# Patient Record
Sex: MALE | Race: BLACK OR AFRICAN AMERICAN | NOT HISPANIC OR LATINO | URBAN - METROPOLITAN AREA
[De-identification: names, ages, dates, MRNs, and addresses within clinical notes are randomized per-mention and may not be internally consistent; named-entity substitution may affect disease eponyms.]

---

## 2020-07-06 ENCOUNTER — INPATIENT (INPATIENT)
Facility: HOSPITAL | Age: 60
LOS: 24 days | Discharge: HOME | End: 2020-07-31
Attending: INTERNAL MEDICINE | Admitting: INTERNAL MEDICINE
Payer: COMMERCIAL

## 2020-07-06 VITALS
WEIGHT: 100.09 LBS | OXYGEN SATURATION: 96 % | DIASTOLIC BLOOD PRESSURE: 74 MMHG | HEART RATE: 90 BPM | TEMPERATURE: 98 F | HEIGHT: 68 IN | SYSTOLIC BLOOD PRESSURE: 134 MMHG | RESPIRATION RATE: 16 BRPM

## 2020-07-06 LAB
ACETONE SERPL-MCNC: NEGATIVE — SIGNIFICANT CHANGE UP
ALBUMIN SERPL ELPH-MCNC: 3.9 G/DL — SIGNIFICANT CHANGE UP (ref 3.3–5)
ALP SERPL-CCNC: 96 U/L — SIGNIFICANT CHANGE UP (ref 40–120)
ALT FLD-CCNC: 29 U/L — SIGNIFICANT CHANGE UP (ref 12–78)
ANION GAP SERPL CALC-SCNC: 9 MMOL/L — SIGNIFICANT CHANGE UP (ref 5–17)
APTT BLD: 33.5 SEC — SIGNIFICANT CHANGE UP (ref 27.5–35.5)
AST SERPL-CCNC: 56 U/L — HIGH (ref 15–37)
BILIRUB SERPL-MCNC: 1.7 MG/DL — HIGH (ref 0.2–1.2)
BUN SERPL-MCNC: 11 MG/DL — SIGNIFICANT CHANGE UP (ref 7–23)
CALCIUM SERPL-MCNC: 9 MG/DL — SIGNIFICANT CHANGE UP (ref 8.5–10.1)
CHLORIDE SERPL-SCNC: 93 MMOL/L — LOW (ref 96–108)
CK MB BLD-MCNC: <2.9 % — SIGNIFICANT CHANGE UP (ref 0–3.5)
CK MB CFR SERPL CALC: <1 NG/ML — SIGNIFICANT CHANGE UP (ref 0.5–3.6)
CK SERPL-CCNC: 34 U/L — SIGNIFICANT CHANGE UP (ref 26–308)
CO2 SERPL-SCNC: 27 MMOL/L — SIGNIFICANT CHANGE UP (ref 22–31)
CREAT SERPL-MCNC: 1.07 MG/DL — SIGNIFICANT CHANGE UP (ref 0.5–1.3)
GLUCOSE BLDC GLUCOMTR-MCNC: 212 MG/DL — HIGH (ref 70–99)
GLUCOSE BLDC GLUCOMTR-MCNC: 301 MG/DL — HIGH (ref 70–99)
GLUCOSE BLDC GLUCOMTR-MCNC: 329 MG/DL — HIGH (ref 70–99)
GLUCOSE BLDC GLUCOMTR-MCNC: >600 MG/DL — CRITICAL HIGH (ref 70–99)
GLUCOSE BLDC GLUCOMTR-MCNC: >600 MG/DL — CRITICAL HIGH (ref 70–99)
GLUCOSE SERPL-MCNC: 611 MG/DL — CRITICAL HIGH (ref 70–99)
HCT VFR BLD CALC: 38.1 % — LOW (ref 39–50)
HGB BLD-MCNC: 13.2 G/DL — SIGNIFICANT CHANGE UP (ref 13–17)
INR BLD: 1.09 RATIO — SIGNIFICANT CHANGE UP (ref 0.88–1.16)
MCHC RBC-ENTMCNC: 23.1 PG — LOW (ref 27–34)
MCHC RBC-ENTMCNC: 34.6 GM/DL — SIGNIFICANT CHANGE UP (ref 32–36)
MCV RBC AUTO: 66.6 FL — LOW (ref 80–100)
NRBC # BLD: 0 /100 WBCS — SIGNIFICANT CHANGE UP (ref 0–0)
PLATELET # BLD AUTO: 277 K/UL — SIGNIFICANT CHANGE UP (ref 150–400)
POTASSIUM SERPL-MCNC: 4.7 MMOL/L — SIGNIFICANT CHANGE UP (ref 3.5–5.3)
POTASSIUM SERPL-SCNC: 4.7 MMOL/L — SIGNIFICANT CHANGE UP (ref 3.5–5.3)
PROT SERPL-MCNC: 8.7 GM/DL — HIGH (ref 6–8.3)
PROTHROM AB SERPL-ACNC: 12.1 SEC — SIGNIFICANT CHANGE UP (ref 10.6–13.6)
RBC # BLD: 5.72 M/UL — SIGNIFICANT CHANGE UP (ref 4.2–5.8)
RBC # FLD: 18.4 % — HIGH (ref 10.3–14.5)
SODIUM SERPL-SCNC: 129 MMOL/L — LOW (ref 135–145)
TROPONIN I SERPL-MCNC: <.015 NG/ML — SIGNIFICANT CHANGE UP (ref 0.01–0.04)
WBC # BLD: 6.61 K/UL — SIGNIFICANT CHANGE UP (ref 3.8–10.5)
WBC # FLD AUTO: 6.61 K/UL — SIGNIFICANT CHANGE UP (ref 3.8–10.5)

## 2020-07-06 PROCEDURE — 93010 ELECTROCARDIOGRAM REPORT: CPT

## 2020-07-06 PROCEDURE — 70450 CT HEAD/BRAIN W/O DYE: CPT | Mod: 26

## 2020-07-06 PROCEDURE — 74177 CT ABD & PELVIS W/CONTRAST: CPT | Mod: 26

## 2020-07-06 PROCEDURE — 71275 CT ANGIOGRAPHY CHEST: CPT | Mod: 26

## 2020-07-06 PROCEDURE — 99285 EMERGENCY DEPT VISIT HI MDM: CPT

## 2020-07-06 PROCEDURE — 99223 1ST HOSP IP/OBS HIGH 75: CPT

## 2020-07-06 RX ORDER — INSULIN LISPRO 100/ML
3 VIAL (ML) SUBCUTANEOUS
Refills: 0 | Status: DISCONTINUED | OUTPATIENT
Start: 2020-07-06 | End: 2020-07-08

## 2020-07-06 RX ORDER — GLUCAGON INJECTION, SOLUTION 0.5 MG/.1ML
1 INJECTION, SOLUTION SUBCUTANEOUS ONCE
Refills: 0 | Status: DISCONTINUED | OUTPATIENT
Start: 2020-07-06 | End: 2020-07-31

## 2020-07-06 RX ORDER — INSULIN LISPRO 100/ML
VIAL (ML) SUBCUTANEOUS
Refills: 0 | Status: DISCONTINUED | OUTPATIENT
Start: 2020-07-06 | End: 2020-07-21

## 2020-07-06 RX ORDER — HEXAVITAMINS
300 TABLET ORAL DAILY
Refills: 0 | Status: DISCONTINUED | OUTPATIENT
Start: 2020-07-06 | End: 2020-07-13

## 2020-07-06 RX ORDER — DEXTROSE 50 % IN WATER 50 %
12.5 SYRINGE (ML) INTRAVENOUS ONCE
Refills: 0 | Status: DISCONTINUED | OUTPATIENT
Start: 2020-07-06 | End: 2020-07-31

## 2020-07-06 RX ORDER — SODIUM CHLORIDE 9 MG/ML
1000 INJECTION, SOLUTION INTRAVENOUS
Refills: 0 | Status: DISCONTINUED | OUTPATIENT
Start: 2020-07-06 | End: 2020-07-06

## 2020-07-06 RX ORDER — DEXTROSE 50 % IN WATER 50 %
25 SYRINGE (ML) INTRAVENOUS ONCE
Refills: 0 | Status: DISCONTINUED | OUTPATIENT
Start: 2020-07-06 | End: 2020-07-31

## 2020-07-06 RX ORDER — SODIUM CHLORIDE 9 MG/ML
1000 INJECTION, SOLUTION INTRAVENOUS
Refills: 0 | Status: DISCONTINUED | OUTPATIENT
Start: 2020-07-06 | End: 2020-07-31

## 2020-07-06 RX ORDER — INSULIN GLARGINE 100 [IU]/ML
10 INJECTION, SOLUTION SUBCUTANEOUS AT BEDTIME
Refills: 0 | Status: DISCONTINUED | OUTPATIENT
Start: 2020-07-06 | End: 2020-07-11

## 2020-07-06 RX ORDER — ENOXAPARIN SODIUM 100 MG/ML
40 INJECTION SUBCUTANEOUS DAILY
Refills: 0 | Status: DISCONTINUED | OUTPATIENT
Start: 2020-07-06 | End: 2020-07-23

## 2020-07-06 RX ORDER — DEXTROSE 50 % IN WATER 50 %
15 SYRINGE (ML) INTRAVENOUS ONCE
Refills: 0 | Status: DISCONTINUED | OUTPATIENT
Start: 2020-07-06 | End: 2020-07-31

## 2020-07-06 RX ORDER — HEXAVITAMINS
1 TABLET ORAL
Qty: 0 | Refills: 0 | DISCHARGE

## 2020-07-06 RX ORDER — SODIUM CHLORIDE 9 MG/ML
1000 INJECTION INTRAMUSCULAR; INTRAVENOUS; SUBCUTANEOUS ONCE
Refills: 0 | Status: COMPLETED | OUTPATIENT
Start: 2020-07-06 | End: 2020-07-06

## 2020-07-06 RX ORDER — INSULIN HUMAN 100 [IU]/ML
14 INJECTION, SOLUTION SUBCUTANEOUS ONCE
Refills: 0 | Status: COMPLETED | OUTPATIENT
Start: 2020-07-06 | End: 2020-07-06

## 2020-07-06 RX ORDER — PYRIDOXINE HCL (VITAMIN B6) 100 MG
50 TABLET ORAL DAILY
Refills: 0 | Status: DISCONTINUED | OUTPATIENT
Start: 2020-07-06 | End: 2020-07-25

## 2020-07-06 RX ORDER — INSULIN LISPRO 100/ML
2 VIAL (ML) SUBCUTANEOUS
Refills: 0 | Status: DISCONTINUED | OUTPATIENT
Start: 2020-07-06 | End: 2020-07-06

## 2020-07-06 RX ORDER — SODIUM CHLORIDE 9 MG/ML
1000 INJECTION, SOLUTION INTRAVENOUS
Refills: 0 | Status: DISCONTINUED | OUTPATIENT
Start: 2020-07-06 | End: 2020-07-08

## 2020-07-06 RX ADMIN — INSULIN GLARGINE 10 UNIT(S): 100 INJECTION, SOLUTION SUBCUTANEOUS at 22:50

## 2020-07-06 RX ADMIN — SODIUM CHLORIDE 1000 MILLILITER(S): 9 INJECTION INTRAMUSCULAR; INTRAVENOUS; SUBCUTANEOUS at 13:36

## 2020-07-06 RX ADMIN — INSULIN HUMAN 14 UNIT(S): 100 INJECTION, SOLUTION SUBCUTANEOUS at 11:51

## 2020-07-06 RX ADMIN — SODIUM CHLORIDE 1000 MILLILITER(S): 9 INJECTION INTRAMUSCULAR; INTRAVENOUS; SUBCUTANEOUS at 11:51

## 2020-07-06 RX ADMIN — SODIUM CHLORIDE 1000 MILLILITER(S): 9 INJECTION INTRAMUSCULAR; INTRAVENOUS; SUBCUTANEOUS at 16:36

## 2020-07-06 RX ADMIN — SODIUM CHLORIDE 70 MILLILITER(S): 9 INJECTION, SOLUTION INTRAVENOUS at 22:50

## 2020-07-06 NOTE — CONSULT NOTE ADULT - SUBJECTIVE AND OBJECTIVE BOX
Tele Hospitalist Consult Note for Inpatient Admission    PMD:    HPI:  60 yr old diagnosed of latent TB and started on Rifampin and Isoniazid 3 months ago while in [facility]. He came to [place] 2 weeks ago and the dgtr has noticed 20 pds wt loss, weakness, poor appetite, constantly thirsty. They attributed it to the TB medications SE. However patient passed out ---    In ED noted severe hyperglycemia---    PAST MEDICAL & SURGICAL HISTORY:  TB (tuberculosis) Latent    No significant past surgical history    MEDICATIONS  (STANDING):  sodium chloride 0.9% Bolus 1000 milliLiter(s) IV Bolus once    MEDICATIONS  (PRN):  None    Allergies  No Known Allergies    Intolerances  None    SOCIAL HISTORY:    FAMILY HISTORY:      Vital Signs Last 24 Hrs  T(C): 36.5 (06 Jul 2020 09:25), Max: 36.5 (06 Jul 2020 09:25)  T(F): 97.7 (06 Jul 2020 09:25), Max: 97.7 (06 Jul 2020 09:25)  HR: 90 (06 Jul 2020 09:25) (90 - 90)  BP: 134/74 (06 Jul 2020 09:25) (134/74 - 134/74)  BP(mean): --  RR: 16 (06 Jul 2020 09:25) (16 - 16)  SpO2: 96% (06 Jul 2020 09:25) (96% - 96%)    REVIEW OF SYSTEMS:        PHYSICAL EXAM:          LABS:                        13.2   6.61  )-----------( 277      ( 06 Jul 2020 10:33 )             38.1     07-06    129<L>  |  93<L>  |  11  ----------------------------<  611<HH>  4.7   |  27  |  1.07    Ca    9.0      06 Jul 2020 10:33    TPro  8.7<H>  /  Alb  3.9  /  TBili  1.7<H>  /  DBili  x   /  AST  56<H>  /  ALT  29  /  AlkPhos  96  07-06    PT/INR - ( 06 Jul 2020 10:33 )   PT: 12.1 sec;   INR: 1.09 ratio         PTT - ( 06 Jul 2020 10:33 )  PTT:33.5 sec      RADIOLOGY & ADDITIONAL STUDIES: Tele Hospitalist Consult Note for Inpatient Admission    PMD: Artie Gallegos    History provided by Dgtr Ingrid    HPI:  60 yr old diagnosed of latent TB and started on Rifampin and Isoniazid 3 months ago while in [facility]. He came to [place] 2 weeks ago and the dgtr has noticed 20 pds wt loss, weakness, poor appetite, constantly thirsty, increased urination. They attributed it to the TB medications SE. They did take him to their PMD, who recommended that he go to ED. However patient passed out- slumped over while sitting in a chair and did not respond for couple of minutes. Upon shaking after he came awake and said he was fine. Dgtr brought him to ED with concerns.     In ED noted severe hyperglycemia and not in DKA. IVF and Insulin given.    PAST MEDICAL & SURGICAL HISTORY:  TB (tuberculosis) Latent    No significant past surgical history    MEDICATIONS  (STANDING):  sodium chloride 0.9% Bolus 1000 milliLiter(s) IV Bolus once    MEDICATIONS  (PRN):  None    Allergies  No Known Allergies    Intolerances  None    SOCIAL HISTORY:  Denies habits    FAMILY HISTORY:  Family not aware of significant med dz in the family    Vital Signs Last 24 Hrs  T(C): 36.5 (06 Jul 2020 09:25), Max: 36.5 (06 Jul 2020 09:25)  T(F): 97.7 (06 Jul 2020 09:25), Max: 97.7 (06 Jul 2020 09:25)  HR: 90 (06 Jul 2020 09:25) (90 - 90)  BP: 134/74 (06 Jul 2020 09:25) (134/74 - 134/74)  BP(mean): --  RR: 16 (06 Jul 2020 09:25) (16 - 16)  SpO2: 96% (06 Jul 2020 09:25) (96% - 96%)    REVIEW OF SYSTEMS:  weak +  increased thirst and urination +  weight loss +  cachexia +  dry mouth+  feels better since being in the hospital  finger cramps +    PHYSICAL EXAM:  comfortable, lying in bed, no acute distress  able to converse and give pertinent history    LABS:                        13.2   6.61  )-----------( 277      ( 06 Jul 2020 10:33 )             38.1     07-06    129<L>  |  93<L>  |  11  ----------------------------<  611<HH>  4.7   |  27  |  1.07    Ca    9.0      06 Jul 2020 10:33    TPro  8.7<H>  /  Alb  3.9  /  TBili  1.7<H>  /  DBili  x   /  AST  56<H>  /  ALT  29  /  AlkPhos  96  07-06    PT/INR - ( 06 Jul 2020 10:33 )   PT: 12.1 sec;   INR: 1.09 ratio         PTT - ( 06 Jul 2020 10:33 )  PTT:33.5 sec      RADIOLOGY & ADDITIONAL STUDIES:

## 2020-07-06 NOTE — ED ADULT TRIAGE NOTE - CHIEF COMPLAINT QUOTE
General weakness x 2 weeks, weight loss 20 pounds and passed out today, on meds for TB started 3 months ago

## 2020-07-06 NOTE — ED PROVIDER NOTE - CLINICAL SUMMARY MEDICAL DECISION MAKING FREE TEXT BOX
pt presented with weakness, syncope and new onset diabetes, currently being treated for latent tb, ct shows rul infiltrates, pt given ivf and insulin

## 2020-07-06 NOTE — CONSULT NOTE ADULT - ASSESSMENT
60 yr old with Latent TB from [facility] with wt loss, constant thirst, poor appetite, weakness and    # New diagnosis of DM2 with severe hyperglycemia  not in DKA  received 3 L fluid bolus in ED and 14 units Humulin  Accucheck from >600 to 300  Aggressive hydration  Given poor PO intake Accuchecks Q 6 hrs for next 24-48 hrs  Lantus HS, HISS, Premeal Insulin. titrate as needed  Endocrine consult if necessary  DM education    # Pseudohyponatremia  Corrected sodium 141  More IVF  Monitor for correction with glycemic control    # Syncope  CT head nonacute  PE ruled out  EKG nonacute  hemodynamically stable  can be sec to hyperglycemia  However if further cardio w/u required will defer to attending of record  PT eval    # Microcytosis  H/H good  if microcytosis persists can add iron to his regimen    # very mild elevation of AST and Bili  likely sec to anti TB meds  Monitor    # Cachexia by BMI  add nutritional supplements    # Latent TB  Cont Rifampin and Isoniazid  CT findings on the upper lobe consistent    Lovenox 60 yr old with Latent TB from [facility] with wt loss, constant thirst, poor appetite, weakness and increased urination     # New diagnosis of DM2 with severe hyperglycemia  not in DKA  received 3 L fluid bolus in ED and 14 units Humulin  Accucheck from >600 to 300  Aggressive hydration  Given poor PO intake Accuchecks Q 6 hrs for next 24-48 hrs  Lantus HS, HISS, Premeal Insulin. titrate as needed  Endocrine consult if necessary  DM education    # Pseudohyponatremia  Corrected sodium 141  More IVF  Monitor for correction with glycemic control    # Syncope  CT head nonacute  PE ruled out  EKG nonacute  hemodynamically stable  can be sec to hyperglycemia  However if further cardio w/u required will defer to attending of record  PT eval    # Microcytosis  H/H good  if microcytosis persists can add iron to his regimen    # very mild elevation of AST and Bili  likely sec to anti TB meds  Monitor  weekly LFTs    # Cachexia by BMI  add nutritional supplements    # Latent TB  Cont Rifampin 10mg/kg  and Isoniazid 5mg/kg x 3 months, B6  CT findings on the upper lobe consistent  given diagnosis was 3 months, recommend ID consult    Lovenox    Relevant diagnostics and plan of treatment were discussed with the patient to the best of my ability and pertinent questions were answered. 60 yr old with Latent TB from [facility] with wt loss, constant thirst, poor appetite, weakness and increased urination .    Admission note from telehospitalist reviewed in full. PE exam reviewed in full. Agree with above treatment and plan.   No anion gap elevation on arrival.      # New diagnosis of DM2 with severe hyperglycemia  not in DKA  received 3 L fluid bolus in ED and 14 units Humulin  Accucheck from >600 to 300  Aggressive hydration  Given poor PO intake Accuchecks Q 6 hrs for next 24-48 hrs  Lantus HS, HISS, Premeal Insulin. titrate as needed  Endocrine consult if necessary  DM education    # Pseudohyponatremia  Corrected sodium 141  More IVF  Monitor for correction with glycemic control    # Syncope  CT head nonacute  PE ruled out  EKG nonacute  hemodynamically stable  can be sec to hyperglycemia  However if further cardio w/u required will defer to attending of record  PT eval    # Microcytosis  H/H good  if microcytosis persists can add iron to his regimen    # very mild elevation of AST and Bili  likely sec to anti TB meds  Monitor  weekly LFTs    # Cachexia by BMI  add nutritional supplements    # Latent TB  Cont Rifampin 10mg/kg  and Isoniazid 5mg/kg x 3 months, B6  CT findings on the upper lobe consistent  given diagnosis was 3 months, recommend ID consult    Lovenox    Relevant diagnostics and plan of treatment were discussed with the patient to the best of my ability and pertinent questions were answered.

## 2020-07-06 NOTE — ED ADULT NURSE NOTE - CAS TRG GENERAL AIRWAY, MLM
Patent Anxiety    Depression    Kidney cysts    OA (osteoarthritis)    Osteoporosis    Peripheral neuropathy    RA (rheumatoid arthritis)

## 2020-07-06 NOTE — ED PROVIDER NOTE - OBJECTIVE STATEMENT
60 year old male with recent h/o latent TB presents today biba from home for weakness x 2 weeks and syncope at home today, pt's daughter (Ingrid) reports that pt recently came from [facility] one week ago, he was diagnose while in [facility] with latent Tb three months ago, started on Rifampin and isoniazid, once he came to [place] she states that she noticed that he was very weak, not eating, very thirsty and weight loss of 20lbs, initially they thought it was due to the TB medications, however, when he passed out today EMS was called, +unsteady gait (-) nausea or vomiting (-) chest pain (-) sob

## 2020-07-06 NOTE — ED PROVIDER NOTE - CARE PLAN
Principal Discharge DX:	TB (pulmonary tuberculosis)  Secondary Diagnosis:	Syncope  Secondary Diagnosis:	Diabetes  Secondary Diagnosis:	Weight loss

## 2020-07-06 NOTE — ED ADULT NURSE REASSESSMENT NOTE - NS ED NURSE REASSESS COMMENT FT1
spoke with provider in regards to patients Na level. no orders placed, will endorse to floor RN if further treatment required.

## 2020-07-07 DIAGNOSIS — E43 UNSPECIFIED SEVERE PROTEIN-CALORIE MALNUTRITION: ICD-10-CM

## 2020-07-07 DIAGNOSIS — N17.9 ACUTE KIDNEY FAILURE, UNSPECIFIED: ICD-10-CM

## 2020-07-07 DIAGNOSIS — E11.65 TYPE 2 DIABETES MELLITUS WITH HYPERGLYCEMIA: ICD-10-CM

## 2020-07-07 DIAGNOSIS — E87.1 HYPO-OSMOLALITY AND HYPONATREMIA: ICD-10-CM

## 2020-07-07 DIAGNOSIS — A15.0 TUBERCULOSIS OF LUNG: ICD-10-CM

## 2020-07-07 DIAGNOSIS — R55 SYNCOPE AND COLLAPSE: ICD-10-CM

## 2020-07-07 LAB
A1C WITH ESTIMATED AVERAGE GLUCOSE RESULT: 10.6 % — HIGH (ref 4–5.6)
ANION GAP SERPL CALC-SCNC: 6 MMOL/L — SIGNIFICANT CHANGE UP (ref 5–17)
BASOPHILS # BLD AUTO: 0.03 K/UL — SIGNIFICANT CHANGE UP (ref 0–0.2)
BASOPHILS NFR BLD AUTO: 0.5 % — SIGNIFICANT CHANGE UP (ref 0–2)
BUN SERPL-MCNC: 9 MG/DL — SIGNIFICANT CHANGE UP (ref 7–23)
CALCIUM SERPL-MCNC: 7.9 MG/DL — LOW (ref 8.5–10.1)
CHLORIDE SERPL-SCNC: 105 MMOL/L — SIGNIFICANT CHANGE UP (ref 96–108)
CO2 SERPL-SCNC: 27 MMOL/L — SIGNIFICANT CHANGE UP (ref 22–31)
CREAT SERPL-MCNC: 0.82 MG/DL — SIGNIFICANT CHANGE UP (ref 0.5–1.3)
EOSINOPHIL # BLD AUTO: 0.06 K/UL — SIGNIFICANT CHANGE UP (ref 0–0.5)
EOSINOPHIL NFR BLD AUTO: 1 % — SIGNIFICANT CHANGE UP (ref 0–6)
ESTIMATED AVERAGE GLUCOSE: 258 MG/DL — HIGH (ref 68–114)
GLUCOSE BLDC GLUCOMTR-MCNC: 215 MG/DL — HIGH (ref 70–99)
GLUCOSE BLDC GLUCOMTR-MCNC: 270 MG/DL — HIGH (ref 70–99)
GLUCOSE BLDC GLUCOMTR-MCNC: 340 MG/DL — HIGH (ref 70–99)
GLUCOSE BLDC GLUCOMTR-MCNC: 490 MG/DL — CRITICAL HIGH (ref 70–99)
GLUCOSE BLDC GLUCOMTR-MCNC: 510 MG/DL — CRITICAL HIGH (ref 70–99)
GLUCOSE SERPL-MCNC: 301 MG/DL — HIGH (ref 70–99)
HCT VFR BLD CALC: 33.3 % — LOW (ref 39–50)
HGB BLD-MCNC: 11.7 G/DL — LOW (ref 13–17)
IMM GRANULOCYTES NFR BLD AUTO: 0.3 % — SIGNIFICANT CHANGE UP (ref 0–1.5)
LYMPHOCYTES # BLD AUTO: 2 K/UL — SIGNIFICANT CHANGE UP (ref 1–3.3)
LYMPHOCYTES # BLD AUTO: 32.6 % — SIGNIFICANT CHANGE UP (ref 13–44)
MAGNESIUM SERPL-MCNC: 2.2 MG/DL — SIGNIFICANT CHANGE UP (ref 1.6–2.6)
MCHC RBC-ENTMCNC: 23.6 PG — LOW (ref 27–34)
MCHC RBC-ENTMCNC: 35.1 GM/DL — SIGNIFICANT CHANGE UP (ref 32–36)
MCV RBC AUTO: 67.1 FL — LOW (ref 80–100)
MONOCYTES # BLD AUTO: 0.6 K/UL — SIGNIFICANT CHANGE UP (ref 0–0.9)
MONOCYTES NFR BLD AUTO: 9.8 % — SIGNIFICANT CHANGE UP (ref 2–14)
NEUTROPHILS # BLD AUTO: 3.42 K/UL — SIGNIFICANT CHANGE UP (ref 1.8–7.4)
NEUTROPHILS NFR BLD AUTO: 55.8 % — SIGNIFICANT CHANGE UP (ref 43–77)
NRBC # BLD: 0 /100 WBCS — SIGNIFICANT CHANGE UP (ref 0–0)
OSMOLALITY SERPL: 300 MOSMOL/KG — SIGNIFICANT CHANGE UP (ref 280–301)
PHOSPHATE SERPL-MCNC: 2.7 MG/DL — SIGNIFICANT CHANGE UP (ref 2.5–4.5)
PLATELET # BLD AUTO: 257 K/UL — SIGNIFICANT CHANGE UP (ref 150–400)
POTASSIUM SERPL-MCNC: 3.8 MMOL/L — SIGNIFICANT CHANGE UP (ref 3.5–5.3)
POTASSIUM SERPL-SCNC: 3.8 MMOL/L — SIGNIFICANT CHANGE UP (ref 3.5–5.3)
RBC # BLD: 4.96 M/UL — SIGNIFICANT CHANGE UP (ref 4.2–5.8)
RBC # FLD: 17.5 % — HIGH (ref 10.3–14.5)
SARS-COV-2 RNA SPEC QL NAA+PROBE: SIGNIFICANT CHANGE UP
SODIUM SERPL-SCNC: 138 MMOL/L — SIGNIFICANT CHANGE UP (ref 135–145)
WBC # BLD: 6.13 K/UL — SIGNIFICANT CHANGE UP (ref 3.8–10.5)
WBC # FLD AUTO: 6.13 K/UL — SIGNIFICANT CHANGE UP (ref 3.8–10.5)

## 2020-07-07 PROCEDURE — 99233 SBSQ HOSP IP/OBS HIGH 50: CPT

## 2020-07-07 RX ADMIN — ENOXAPARIN SODIUM 40 MILLIGRAM(S): 100 INJECTION SUBCUTANEOUS at 13:19

## 2020-07-07 RX ADMIN — Medication 6: at 08:54

## 2020-07-07 RX ADMIN — Medication 3 UNIT(S): at 12:58

## 2020-07-07 RX ADMIN — Medication 8: at 11:23

## 2020-07-07 RX ADMIN — Medication 3 UNIT(S): at 17:17

## 2020-07-07 RX ADMIN — Medication 3 UNIT(S): at 08:54

## 2020-07-07 RX ADMIN — Medication 300 MILLIGRAM(S): at 13:18

## 2020-07-07 RX ADMIN — Medication 12: at 17:17

## 2020-07-07 RX ADMIN — INSULIN GLARGINE 10 UNIT(S): 100 INJECTION, SOLUTION SUBCUTANEOUS at 21:53

## 2020-07-07 RX ADMIN — Medication 50 MILLIGRAM(S): at 13:18

## 2020-07-07 NOTE — PROGRESS NOTE ADULT - SUBJECTIVE AND OBJECTIVE BOX
Patient is a 60y old  Male who presents with a chief complaint of   HPI:    SUBJECTIVE & OBJECTIVE: Pt seen and examined at bedside.   PHYSICAL EXAM:   Vital Signs Last 24 Hrs  T(C): 36.9 (2020 16:39), Max: 37.2 (2020 05:37)  T(F): 98.5 (2020 16:39), Max: 98.9 (2020 05:37)  HR: 76 (2020 16:39) (71 - 88)  BP: 114/62 (2020 16:39) (107/63 - 123/65)  BP(mean): --  ABP: --  ABP(mean): --  RR: 18 (2020 16:39) (16 - 18)  SpO2: 98% (2020 16:39) (98% - 99%)  Daily     Daily Weight in k.2 (2020 05:37)  I&O's Detail    2020 07:01  -  2020 07:00  --------------------------------------------------------  IN:  Total IN: 0 mL    OUT:    Voided: 600 mL  Total OUT: 600 mL    Total NET: -600 mL      2020 07:01  -  2020 21:13  --------------------------------------------------------  IN:    Oral Fluid: 440 mL    sodium chloride 0.9%: 140 mL  Total IN: 580 mL    OUT:    Voided: 600 mL  Total OUT: 600 mL    Total NET: -20 mL        GENERAL: NAD, well-groomed, well-developed  HEAD:  Atraumatic, Normocephalic  EYES: EOMI, PERRLA, conjunctiva and sclera clear  ENMT: Moist mucous membranes  NECK: Supple, No JVD  NERVOUS SYSTEM:  Alert & Oriented X3, Motor Strength 5/5 B/L upper and lower extremities; DTRs 2+ intact and symmetric  CHEST/LUNG: poor air entry to bedside.   HEART: Regular rate and rhythm; No murmurs, rubs, or gallops  ABDOMEN: Soft, Nontender, Nondistended; Bowel sounds present  EXTREMITIES:  2+ Peripheral Pulses, No clubbing, cyanosis, or edema  MEDICATIONS  (STANDING):  dextrose 5%. 1000 milliLiter(s) (50 mL/Hr) IV Continuous <Continuous>  dextrose 50% Injectable 12.5 Gram(s) IV Push once  dextrose 50% Injectable 25 Gram(s) IV Push once  dextrose 50% Injectable 25 Gram(s) IV Push once  enoxaparin Injectable 40 milliGRAM(s) SubCutaneous daily  insulin glargine Injectable (LANTUS) 10 Unit(s) SubCutaneous at bedtime  insulin lispro (HumaLOG) corrective regimen sliding scale   SubCutaneous three times a day before meals  insulin lispro Injectable (HumaLOG) 3 Unit(s) SubCutaneous three times a day with meals  isoniazid 300 milliGRAM(s) Oral daily  pyridoxine 50 milliGRAM(s) Oral daily  rifAMPin 600 milliGRAM(s) Oral daily  sodium chloride 0.9% 1000 milliLiter(s) (70 mL/Hr) IV Continuous <Continuous>    MEDICATIONS  (PRN):  dextrose 40% Gel 15 Gram(s) Oral once PRN Blood Glucose LESS THAN 70 milliGRAM(s)/deciliter  glucagon  Injectable 1 milliGRAM(s) IntraMuscular once PRN Glucose LESS THAN 70 milligrams/deciliter    LABS:                        11.7   6.13  )-----------( 257      ( 2020 06:28 )             33.3     07-07    138  |  105  |  9   ----------------------------<  301<H>  3.8   |  27  |  0.82    Ca    7.9<L>      2020 06:28  Phos  2.7     07-07  Mg     2.2     07-07    TPro  8.7<H>  /  Alb  3.9  /  TBili  1.7<H>  /  DBili  x   /  AST  56<H>  /  ALT  29  /  AlkPhos  96  07-06    PT/INR - ( 2020 10:33 )   PT: 12.1 sec;   INR: 1.09 ratio         PTT - ( 2020 10:33 )  PTT:33.5 sec    CAPILLARY BLOOD GLUCOSE      POCT Blood Glucose.: 510 mg/dL (2020 16:52)  POCT Blood Glucose.: 490 mg/dL (2020 16:51)  POCT Blood Glucose.: 340 mg/dL (2020 11:17)  POCT Blood Glucose.: 270 mg/dL (2020 08:06)  POCT Blood Glucose.: 329 mg/dL (2020 22:49)      CARDIAC MARKERS ( 2020 10:33 )  <.015 ng/mL / x     / 34 U/L / x     / <1.0 ng/mL        RECENT CULTURES:    RADIOLOGY & ADDITIONAL TESTS:    DVT/GI ppx  Discussed with pt @ bedside

## 2020-07-07 NOTE — PROGRESS NOTE ADULT - PROBLEM SELECTOR PLAN 3
- cavitary lesions on imaging.  Of note, patient had COVID-19 in March 2020 while in [facility], ?? if cavitary lesions are post- COVID sequela.    - ID consult in am   Cont Rifampin 10mg/kg  and Isoniazid 5mg/kg x 3 months, B6  CT findings on the upper lobe consistent

## 2020-07-07 NOTE — PROGRESS NOTE ADULT - PROBLEM SELECTOR PLAN 2
CT head nonacute  PE ruled out  EKG nonacute  hemodynamically stable  can be sec to hyperglycemia  check orthostatics, continue on Tele   PT eval

## 2020-07-07 NOTE — PROGRESS NOTE ADULT - ASSESSMENT
60 yr old with Latent TB from [facility] with wt loss, constant thirst, poor appetite, weakness and increased urination .

## 2020-07-08 LAB
GLUCOSE BLDC GLUCOMTR-MCNC: 226 MG/DL — HIGH (ref 70–99)
GLUCOSE BLDC GLUCOMTR-MCNC: 268 MG/DL — HIGH (ref 70–99)
GLUCOSE BLDC GLUCOMTR-MCNC: 274 MG/DL — HIGH (ref 70–99)
GLUCOSE BLDC GLUCOMTR-MCNC: 382 MG/DL — HIGH (ref 70–99)
PROCALCITONIN SERPL-MCNC: 0.44 NG/ML — HIGH (ref 0.02–0.1)

## 2020-07-08 PROCEDURE — 99233 SBSQ HOSP IP/OBS HIGH 50: CPT

## 2020-07-08 RX ORDER — INSULIN LISPRO 100/ML
5 VIAL (ML) SUBCUTANEOUS
Refills: 0 | Status: DISCONTINUED | OUTPATIENT
Start: 2020-07-08 | End: 2020-07-11

## 2020-07-08 RX ORDER — ACETAMINOPHEN 500 MG
650 TABLET ORAL ONCE
Refills: 0 | Status: COMPLETED | OUTPATIENT
Start: 2020-07-08 | End: 2020-07-08

## 2020-07-08 RX ADMIN — Medication 650 MILLIGRAM(S): at 06:11

## 2020-07-08 NOTE — H&P ADULT - NSHPLABSRESULTS_GEN_ALL_CORE
LABS:                        11.7   6.13  )-----------( 257      ( 07 Jul 2020 06:28 )             33.3     07-07    138  |  105  |  9   ----------------------------<  301<H>  3.8   |  27  |  0.82    Ca    7.9<L>      07 Jul 2020 06:28  Phos  2.7     07-07  Mg     2.2     07-07              RADIOLOGY & ADDITIONAL TESTS:

## 2020-07-08 NOTE — H&P ADULT - ASSESSMENT
60 yr old with Latent TB from [facility] with wt loss, constant thirst, poor appetite, weakness and increased urination .    Admission note from telehospitalist reviewed in full. PE exam reviewed in full. Agree with above treatment and plan.   No anion gap elevation on arrival.      # New diagnosis of DM2 with severe hyperglycemia  not in DKA  received 3 L fluid bolus in ED and 14 units Humulin  Accucheck from >600 to 300  Aggressive hydration  Given poor PO intake Accuchecks Q 6 hrs for next 24-48 hrs  Lantus HS, HISS, Premeal Insulin. titrate as needed  Endocrine consult if necessary  DM education    # Pseudohyponatremia  Corrected sodium 141  More IVF  Monitor for correction with glycemic control    # Syncope  CT head nonacute  PE ruled out  EKG nonacute  hemodynamically stable  can be sec to hyperglycemia  However if further cardio w/u required will defer to attending of record  PT eval    # Microcytosis  H/H good  if microcytosis persists can add iron to his regimen    # very mild elevation of AST and Bili  likely sec to anti TB meds  Monitor  weekly LFTs    # Cachexia by BMI  add nutritional supplements    # Latent TB  Cont Rifampin 10mg/kg  and Isoniazid 5mg/kg x 3 months, B6  CT findings on the upper lobe consistent  given diagnosis was 3 months, recommend ID consult 60 yr old with Latent TB from [facility] with wt loss, constant thirst, poor appetite, weakness and increased urination .    Admission note from telehospitalist reviewed in full. PE exam reviewed in full. Agree with above treatment and plan.   No anion gap elevation on arrival.      # New diagnosis of DM2 with severe hyperglycemia  not in DKA  received 3 L fluid bolus in ED and 14 units Humulin  Accucheck from >600 to 300  Aggressive hydration  Given poor PO intake Accuchecks Q 6 hrs for next 24-48 hrs  Lantus HS, HISS, Premeal Insulin. titrate as needed  Endocrine consult if necessary  DM education    # Pseudohyponatremia  Corrected sodium 141  More IVF  Monitor for correction with glycemic control    # Syncope  CT head nonacute  PE ruled out  EKG nonacute  hemodynamically stable  can be sec to hyperglycemia  However if further cardio w/u required will defer to attending of record  PT eval    # Microcytosis  H/H good  if microcytosis persists can add iron to his regimen    # very mild elevation of AST and Bili  likely sec to anti TB meds  Monitor  weekly LFTs    # Cachexia by BMI  add nutritional supplements    # Latent TB  Cont Rifampin 10mg/kg  and Isoniazid 5mg/kg x 3 months, B6  CT findings on the upper lobe consistent  given diagnosis was 3 months, recommend ID consult    Patient was seen and examined on July 6.

## 2020-07-08 NOTE — CONSULT NOTE ADULT - SUBJECTIVE AND OBJECTIVE BOX
HPI:      PAST MEDICAL & SURGICAL HISTORY:  TB (tuberculosis)  No significant past surgical history      SOCHX:   tobacco,  -  alcohol    FMHX: FA/MO  - contributory       Recent Travel:    Immunizations:    Allergies    No Known Allergies    Intolerances        MEDICATIONS  (STANDING):  dextrose 5%. 1000 milliLiter(s) (50 mL/Hr) IV Continuous <Continuous>  dextrose 50% Injectable 12.5 Gram(s) IV Push once  dextrose 50% Injectable 25 Gram(s) IV Push once  dextrose 50% Injectable 25 Gram(s) IV Push once  enoxaparin Injectable 40 milliGRAM(s) SubCutaneous daily  insulin glargine Injectable (LANTUS) 10 Unit(s) SubCutaneous at bedtime  insulin lispro (HumaLOG) corrective regimen sliding scale   SubCutaneous three times a day before meals  insulin lispro Injectable (HumaLOG) 3 Unit(s) SubCutaneous three times a day with meals  isoniazid 300 milliGRAM(s) Oral daily  pyridoxine 50 milliGRAM(s) Oral daily  rifAMPin 600 milliGRAM(s) Oral daily  sodium chloride 0.9% 1000 milliLiter(s) (70 mL/Hr) IV Continuous <Continuous>        REVIEW OF SYSTEMS:  CONSTITUTIONAL: No fever, weight loss, or fatigue  EYES: No eye pain, visual disturbances, or discharge  ENMT:  No difficulty hearing, tinnitus, vertigo; No sinus or throat pain  NECK: No pain or stiffness  BREASTS: No pain, masses, or nipple discharge  RESPIRATORY: No cough, wheezing, chills or hemoptysis; No shortness of breath  CARDIOVASCULAR: No chest pain, palpitations, dizziness, or leg swelling  GASTROINTESTINAL: No abdominal or epigastric pain. No nausea, vomiting, or hematemesis; No diarrhea or constipation. No melena or hematochezia.  GENITOURINARY: No dysuria, frequency, hematuria, or incontinence  NEUROLOGICAL: No headaches, memory loss, loss of strength, numbness, or tremors  SKIN: No itching, burning, rashes, or lesions       VITAL SIGNS:    T(C): 36.4 (07-08-20 @ 11:00), Max: 36.9 (07-07-20 @ 16:39)  T(F): 97.6 (07-08-20 @ 11:00), Max: 98.5 (07-07-20 @ 16:39)  HR: 74 (07-08-20 @ 11:00) (74 - 91)  BP: 109/68 (07-08-20 @ 11:00) (109/68 - 115/73)  RR: 17 (07-08-20 @ 11:00) (17 - 18)  SpO2: 97% (07-08-20 @ 11:00) (97% - 99%)    PHYSICAL EXAM:    GENERAL: NAD, well-groomed, can communicate well , in RA   HEAD:  Atraumatic, Normocephalic  EYES: EOMI, PERRLA, conjunctiva and sclera clear  ENMT: No tonsillar erythema, exudates, or enlargement; Moist mucous membranes, Good dentition, No lesions  NECK: Supple, No JVD, Normal thyroid  NERVOUS SYSTEM:  Alert & Oriented X3, Good concentration; Motor Strength 5/5 B/L upper and lower extremities; DTRs 2+ intact and symmetric  CHEST/LUNG: Clear bilaterally; No rales, rhonchi, wheezing bilaterally  HEART: Regular rate and rhythm; No murmurs, rubs, or gallops  ABDOMEN: Soft, Nontender, Nondistended; Bowel sounds present  EXTREMITIES:  2+ Peripheral Pulses, No clubbing, cyanosis, or edema  LYMPH: No lymphadenopathy noted  SKIN: No rashes or lesions  BACK: no pressor sore     LABS:                         11.7   6.13  )-----------( 257      ( 07 Jul 2020 06:28 )             33.3     07-07    138  |  105  |  9   ----------------------------<  301<H>  3.8   |  27  |  0.82    Ca    7.9<L>      07 Jul 2020 06:28  Phos  2.7     07-07  Mg     2.2     07-07                                              Radiology:

## 2020-07-08 NOTE — H&P ADULT - NSHPPHYSICALEXAM_GEN_ALL_CORE
PHYSICAL EXAMINATION:  Vital Signs Last 24 Hrs  T(C): 36.4 (08 Jul 2020 11:00), Max: 36.6 (08 Jul 2020 05:28)  T(F): 97.6 (08 Jul 2020 11:00), Max: 97.9 (08 Jul 2020 05:28)  HR: 74 (08 Jul 2020 11:00) (74 - 91)  BP: 109/68 (08 Jul 2020 11:00) (109/68 - 115/73)  BP(mean): --  RR: 17 (08 Jul 2020 11:00) (17 - 18)  SpO2: 97% (08 Jul 2020 11:00) (97% - 99%)  CAPILLARY BLOOD GLUCOSE      POCT Blood Glucose.: 226 mg/dL (08 Jul 2020 16:23)  POCT Blood Glucose.: 382 mg/dL (08 Jul 2020 11:30)  POCT Blood Glucose.: 274 mg/dL (08 Jul 2020 07:35)  POCT Blood Glucose.: 215 mg/dL (07 Jul 2020 21:51)      GENERAL: NAD, well-groomed, well-developed  HEAD:  atraumatic, normocephalic  EYES: sclera anicteric  ENMT: mucous membranes moist  NECK: supple, No JVD  CHEST/LUNG: clear to auscultation bilaterally; no rales, rhonchi, or wheezing b/l  HEART: normal S1, S2  ABDOMEN: BS+, soft, ND, NT   EXTREMITIES:  pulses palpable; no clubbing, cyanosis, or edema b/l LEs  NEURO: awake, alert, interactive; moves all extremities  SKIN: no rashes or lesions

## 2020-07-08 NOTE — PROGRESS NOTE ADULT - SUBJECTIVE AND OBJECTIVE BOX
Patient is a 60y old  Male who presents with a chief complaint of near syncope (07 Jul 2020 21:13)      INTERVAL HPI/OVERNIGHT EVENTS:  Pt was seen and examined, no acute events.    MEDICATIONS  (STANDING):  dextrose 5%. 1000 milliLiter(s) (50 mL/Hr) IV Continuous <Continuous>  dextrose 50% Injectable 12.5 Gram(s) IV Push once  dextrose 50% Injectable 25 Gram(s) IV Push once  dextrose 50% Injectable 25 Gram(s) IV Push once  enoxaparin Injectable 40 milliGRAM(s) SubCutaneous daily  insulin glargine Injectable (LANTUS) 10 Unit(s) SubCutaneous at bedtime  insulin lispro (HumaLOG) corrective regimen sliding scale   SubCutaneous three times a day before meals  insulin lispro Injectable (HumaLOG) 3 Unit(s) SubCutaneous three times a day with meals  isoniazid 300 milliGRAM(s) Oral daily  pyridoxine 50 milliGRAM(s) Oral daily  rifAMPin 600 milliGRAM(s) Oral daily    MEDICATIONS  (PRN):  dextrose 40% Gel 15 Gram(s) Oral once PRN Blood Glucose LESS THAN 70 milliGRAM(s)/deciliter  glucagon  Injectable 1 milliGRAM(s) IntraMuscular once PRN Glucose LESS THAN 70 milligrams/deciliter      Allergies  No Known Allergies      Vital Signs Last 24 Hrs  T(C): 36.4 (08 Jul 2020 11:00), Max: 36.6 (08 Jul 2020 05:28)  T(F): 97.6 (08 Jul 2020 11:00), Max: 97.9 (08 Jul 2020 05:28)  HR: 74 (08 Jul 2020 11:00) (74 - 91)  BP: 109/68 (08 Jul 2020 11:00) (109/68 - 115/73)  BP(mean): --  RR: 17 (08 Jul 2020 11:00) (17 - 18)  SpO2: 97% (08 Jul 2020 11:00) (97% - 99%)      PHYSICAL EXAM:  GENERAL: NAD  HEAD:  Atraumatic  EYES: PERRLA  NERVOUS SYSTEM:  Awake, alert  CHEST/LUNG: Clear  HEART: RRR  ABDOMEN: Soft  EXTREMITIES:  no edema      LABS:                        11.7   6.13  )-----------( 257      ( 07 Jul 2020 06:28 )             33.3     07-07    138  |  105  |  9   ----------------------------<  301<H>  3.8   |  27  |  0.82    Ca    7.9<L>      07 Jul 2020 06:28  Phos  2.7     07-07  Mg     2.2     07-07      CAPILLARY BLOOD GLUCOSE      POCT Blood Glucose.: 226 mg/dL (08 Jul 2020 16:23)  POCT Blood Glucose.: 382 mg/dL (08 Jul 2020 11:30)  POCT Blood Glucose.: 274 mg/dL (08 Jul 2020 07:35)  POCT Blood Glucose.: 215 mg/dL (07 Jul 2020 21:51)  POCT Blood Glucose.: 510 mg/dL (07 Jul 2020 16:52)  POCT Blood Glucose.: 490 mg/dL (07 Jul 2020 16:51)      RADIOLOGY & ADDITIONAL TESTS:    Imaging Personally Reviewed:  [ ] YES  [ ] NO    Consultant(s) Notes Reviewed:  [ ] YES  [ ] NO    Care Discussed with Consultants/Other Providers [ ] YES  [ ] NO

## 2020-07-08 NOTE — PROGRESS NOTE ADULT - ASSESSMENT
60 yr old with Latent TB from [facility] with wt loss, constant thirst, poor appetite, weakness and increased urination .    Cavitary lesion in CT chest:  - H/O latent TB on Rx.  - H/O COVID-19 PNA in march, now PCR neg, check Ab  -Possibly cavitary lesions are post- COVID sequela?    - Resumed treatment, Rifampin 10mg/kg  and Isoniazid 5mg/kg x 3 months, B6  - Check sputum for AFB X 3  - ID consulted    Syncope:  - Check orthostasis, likely vasovagal   - In the setting of uncontrolled DM, viral illness  - PE ruled out  - CT head neg  - Check 2D echo    Uncontrolled DM with hyperglycemia:  - DKA ruled out  - Hb A1c 10.6  - Increase pre meal Humalog to 5 mg TID, continue Lantus    Pseudohyponatremia:  - Corrected Na ok      Severe protein-calorie malnutrition:  - Nutritional supplements.       MERCEDES:  - Resolved, stop IVF      DVT ppx

## 2020-07-08 NOTE — H&P ADULT - HISTORY OF PRESENT ILLNESS
60 yr old diagnosed of latent TB and started on Rifampin and Isoniazid 3 months ago while in [facility]. He came to [place] 2 weeks ago and the dgtr has noticed 20 pds wt loss, weakness, poor appetite, constantly thirsty, increased urination. They attributed it to the TB medications SE. They did take him to their PMD, who recommended that he go to ED. However patient passed out- slumped over while sitting in a chair and did not respond for couple of minutes. Upon shaking after he came awake and said he was fine. Dgtr brought him to ED with concerns.     In ED noted severe hyperglycemia and not in DKA. IVF and Insulin given.

## 2020-07-08 NOTE — CONSULT NOTE ADULT - ASSESSMENT
here for new Dx DM with uncontrolled Hyperglycemia due to diabetes mellitus.  Plan: - likely secondary to COVID-19 (pt. was diagnosed with COVID in March    Latent TB on prophylaxis for 3 months from [facility] r/o Active Tuberculosis   no cough on exam   repeat covid 19 pcr neg  will check covid 19 antibody   CT revealed Right upper lobe opacities, possibly infectious. Recommend continued follow-up to resolution.  Negative for pulmonary embolism.  Bilateral nonobstructive nephrolithiasis. No hydronephrosis.  will send sputum AFB x3   continue isolation   continue TB prophylaxis and vit B6   pt refused HIV test to be tested   pulmonary consult   we will fu  thanks

## 2020-07-09 LAB
ALBUMIN SERPL ELPH-MCNC: 3.2 G/DL — LOW (ref 3.3–5)
ALP SERPL-CCNC: 74 U/L — SIGNIFICANT CHANGE UP (ref 40–120)
ALT FLD-CCNC: 30 U/L — SIGNIFICANT CHANGE UP (ref 12–78)
ANION GAP SERPL CALC-SCNC: 6 MMOL/L — SIGNIFICANT CHANGE UP (ref 5–17)
AST SERPL-CCNC: 50 U/L — HIGH (ref 15–37)
BILIRUB SERPL-MCNC: 0.8 MG/DL — SIGNIFICANT CHANGE UP (ref 0.2–1.2)
BUN SERPL-MCNC: 12 MG/DL — SIGNIFICANT CHANGE UP (ref 7–23)
CALCIUM SERPL-MCNC: 8.8 MG/DL — SIGNIFICANT CHANGE UP (ref 8.5–10.1)
CHLORIDE SERPL-SCNC: 101 MMOL/L — SIGNIFICANT CHANGE UP (ref 96–108)
CO2 SERPL-SCNC: 28 MMOL/L — SIGNIFICANT CHANGE UP (ref 22–31)
CREAT SERPL-MCNC: 0.78 MG/DL — SIGNIFICANT CHANGE UP (ref 0.5–1.3)
GLUCOSE BLDC GLUCOMTR-MCNC: 126 MG/DL — HIGH (ref 70–99)
GLUCOSE BLDC GLUCOMTR-MCNC: 166 MG/DL — HIGH (ref 70–99)
GLUCOSE BLDC GLUCOMTR-MCNC: 232 MG/DL — HIGH (ref 70–99)
GLUCOSE BLDC GLUCOMTR-MCNC: 269 MG/DL — HIGH (ref 70–99)
GLUCOSE SERPL-MCNC: 197 MG/DL — HIGH (ref 70–99)
HCT VFR BLD CALC: 35.7 % — LOW (ref 39–50)
HCV AB S/CO SERPL IA: 0.2 S/CO — SIGNIFICANT CHANGE UP (ref 0–0.99)
HCV AB SERPL-IMP: SIGNIFICANT CHANGE UP
HGB BLD-MCNC: 12.4 G/DL — LOW (ref 13–17)
MCHC RBC-ENTMCNC: 23.2 PG — LOW (ref 27–34)
MCHC RBC-ENTMCNC: 34.7 GM/DL — SIGNIFICANT CHANGE UP (ref 32–36)
MCV RBC AUTO: 66.7 FL — LOW (ref 80–100)
NIGHT BLUE STAIN TISS: SIGNIFICANT CHANGE UP
NRBC # BLD: 2 /100 WBCS — HIGH (ref 0–0)
PLATELET # BLD AUTO: 277 K/UL — SIGNIFICANT CHANGE UP (ref 150–400)
POTASSIUM SERPL-MCNC: 4.3 MMOL/L — SIGNIFICANT CHANGE UP (ref 3.5–5.3)
POTASSIUM SERPL-SCNC: 4.3 MMOL/L — SIGNIFICANT CHANGE UP (ref 3.5–5.3)
PROCALCITONIN SERPL-MCNC: 0.35 NG/ML — HIGH (ref 0.02–0.1)
PROT SERPL-MCNC: 7.5 GM/DL — SIGNIFICANT CHANGE UP (ref 6–8.3)
RBC # BLD: 5.35 M/UL — SIGNIFICANT CHANGE UP (ref 4.2–5.8)
RBC # FLD: 18.2 % — HIGH (ref 10.3–14.5)
SARS-COV-2 IGG SERPL QL IA: NEGATIVE — SIGNIFICANT CHANGE UP
SARS-COV-2 IGM SERPL IA-ACNC: <0.1 INDEX — SIGNIFICANT CHANGE UP
SODIUM SERPL-SCNC: 135 MMOL/L — SIGNIFICANT CHANGE UP (ref 135–145)
SPECIMEN SOURCE: SIGNIFICANT CHANGE UP
WBC # BLD: 4.52 K/UL — SIGNIFICANT CHANGE UP (ref 3.8–10.5)
WBC # FLD AUTO: 4.52 K/UL — SIGNIFICANT CHANGE UP (ref 3.8–10.5)

## 2020-07-09 PROCEDURE — 99233 SBSQ HOSP IP/OBS HIGH 50: CPT

## 2020-07-09 RX ORDER — SODIUM CHLORIDE 9 MG/ML
4 INJECTION INTRAMUSCULAR; INTRAVENOUS; SUBCUTANEOUS DAILY
Refills: 0 | Status: DISCONTINUED | OUTPATIENT
Start: 2020-07-09 | End: 2020-07-12

## 2020-07-09 RX ORDER — SODIUM CHLORIDE 9 MG/ML
3 INJECTION INTRAMUSCULAR; INTRAVENOUS; SUBCUTANEOUS DAILY
Refills: 0 | Status: DISCONTINUED | OUTPATIENT
Start: 2020-07-09 | End: 2020-07-09

## 2020-07-09 RX ADMIN — Medication 5 UNIT(S): at 17:28

## 2020-07-09 RX ADMIN — Medication 5 UNIT(S): at 08:31

## 2020-07-09 RX ADMIN — SODIUM CHLORIDE 4 MILLILITER(S): 9 INJECTION INTRAMUSCULAR; INTRAVENOUS; SUBCUTANEOUS at 09:40

## 2020-07-09 RX ADMIN — Medication 5 UNIT(S): at 11:54

## 2020-07-09 NOTE — DIETITIAN INITIAL EVALUATION ADULT. - PERTINENT LABORATORY DATA
07-09 Na135 mmol/L Glu 197 mg/dL<H> K+ 4.3 mmol/L Cr  0.78 mg/dL BUN 12 mg/dL 07-07 Phos 2.7 mg/dL 07-09 Alb 3.2 g/dL<L>; 07-08 POCT: 274, 382, 226, 268; 07-07 A1c 10.6, average Glu 258

## 2020-07-09 NOTE — DIETITIAN INITIAL EVALUATION ADULT. - OTHER INFO
Unable to interview pt due to isolation precautions; unable to speak to pt via phone as pt speaks Greek Creole. Per discussion c daughter (Lois [phone]) pt went to [facility] recently for 3 months; upon his return she noticed wt loss as noted. She assumed that he probably wasn't eating well while he was there (he was alone (not c spouse); also noted decreased appetite upon his return (< 75% intake) + the TB meds might have been affecting his appetite; as it turns out those situations may be have contributing factors but pt c newly dx DM (BG levels at admission was >600). Daughter reports dad has always been thin but wt usually stable & appetite is usually good. No reports of any N/V/C/D or chew/swallowing difficulty.  Pt also c recent COVID+ hx (3/2020). Pt lives c daughter & is independent c ADL. Discussed DM diet recommendations c daughter; reviewed CHO food & beverages, importance of CHO portion control, benefits of whole grains vs refined products. Educational material sent to home address for future reference.

## 2020-07-09 NOTE — PROGRESS NOTE ADULT - ASSESSMENT
here for new Dx DM with uncontrolled Hyperglycemia due to diabetes mellitus.  Plan: - likely secondary to COVID-19 (pt. was diagnosed with COVID in March    Latent TB on prophylaxis for 3 months from [facility] r/o Active Tuberculosis   no cough on exam   repeat covid 19 pcr neg  covid 19 antibody neg  CT revealed Right upper lobe opacities, possibly infectious. Recommend continued follow-up to resolution.  Negative for pulmonary embolism.  Bilateral nonobstructive nephrolithiasis. No hydronephrosis.  will send sputum AFB x3   continue isolation   continue TB prophylaxis and vit B6   pt refused HIV test to be tested   pulmonary consult   fu AFB , spoke with floor RN to send today and 2 more consecuous day   not in any res distress on exam today

## 2020-07-09 NOTE — DIETITIAN INITIAL EVALUATION ADULT. - ENERGY NEEDS
Height (cm): 172.72 (07-06)  Weight (kg): 51.8 (07-09)  BMI (kg/m2): 17.3 (07-09)  IBW:  70 kg +/- 10%   % IBW:  74%    UBW:  56.6 kg      %UBW: 91%

## 2020-07-09 NOTE — DIETITIAN INITIAL EVALUATION ADULT. - PHYSICAL APPEARANCE
BMI - 17.3; no edema noted/other (specify) Unable to conduct nutrition-focused physical exam due to isolation precautions.

## 2020-07-09 NOTE — PROGRESS NOTE ADULT - ASSESSMENT
60 yr old with Latent TB from [facility] with wt loss, constant thirst, poor appetite, weakness and increased urination .    Cavitary lesion in CT chest:  - H/O latent TB on meds since march  - As per daughter no H/o COVID-19 , Neg Ab  - Now on rifampin, and INH, B6, will switch to treatment regimen if ID agrees  - Check sputum for AFB X 3  - Pending QuantiFeron   - ID consulted    Syncope:  - Check orthostasis, likely vasovagal   - In the setting of dehydration uncontrolled DM, viral illness  - PE ruled out  - CT head neg  - Check 2D echo    Uncontrolled DM with hyperglycemia:  - DKA ruled out  - Hb A1c 10.6  - Increased pre meal Humalog to 5 mg TID, continue Lantus    Pseudohyponatremia:  - Corrected Na ok      Severe protein-calorie malnutrition:  - Nutritional supplements.       MERCEDES:  - Resolved, stop IVF      DVT ppx    Discussed with daughter on phone.

## 2020-07-09 NOTE — PROGRESS NOTE ADULT - SUBJECTIVE AND OBJECTIVE BOX
HPI:  60 yr old diagnosed of latent TB and started on Rifampin and Isoniazid 3 months ago while in [facility]. He came to [place] 2 weeks ago and the dgtr has noticed 20 pds wt loss, weakness, poor appetite, constantly thirsty, increased urination. They attributed it to the TB medications SE. They did take him to their PMD, who recommended that he go to ED. However patient passed out- slumped over while sitting in a chair and did not respond for couple of minutes. Upon shaking after he came awake and said he was fine. Dgtr brought him to ED with concerns.     In ED noted severe hyperglycemia and not in DKA. IVF and Insulin given. (08 Jul 2020 16:55)      Allergies    No Known Allergies    Intolerances        MEDICATIONS  (STANDING):  dextrose 5%. 1000 milliLiter(s) (50 mL/Hr) IV Continuous <Continuous>  dextrose 50% Injectable 12.5 Gram(s) IV Push once  dextrose 50% Injectable 25 Gram(s) IV Push once  dextrose 50% Injectable 25 Gram(s) IV Push once  enoxaparin Injectable 40 milliGRAM(s) SubCutaneous daily  insulin glargine Injectable (LANTUS) 10 Unit(s) SubCutaneous at bedtime  insulin lispro (HumaLOG) corrective regimen sliding scale   SubCutaneous three times a day before meals  insulin lispro Injectable (HumaLOG) 5 Unit(s) SubCutaneous three times a day before meals  isoniazid 300 milliGRAM(s) Oral daily  pyridoxine 50 milliGRAM(s) Oral daily  rifAMPin 600 milliGRAM(s) Oral daily  sodium chloride 3%  Inhalation 4 milliLiter(s) Inhalation daily    MEDICATIONS  (PRN):  dextrose 40% Gel 15 Gram(s) Oral once PRN Blood Glucose LESS THAN 70 milliGRAM(s)/deciliter  glucagon  Injectable 1 milliGRAM(s) IntraMuscular once PRN Glucose LESS THAN 70 milligrams/deciliter      REVIEW OF SYSTEMS:    CONSTITUTIONAL: No fever  RESPIRATORY: No cough, wheezing, No shortness of breath  CARDIOVASCULAR: No chest pain, palpitations, dizziness  GASTROINTESTINAL: No abdominal pain. No nausea, vomiting, diarrhea  GENITOURINARY: No dysuria  NEUROLOGICAL: No headache  EXTREMITY: No pedal edema BLE  SKIN: No itching, burning, rashes, or lesions     VITAL SIGNS:  T(C): 36.3 (07-09-20 @ 10:55), Max: 36.5 (07-09-20 @ 00:13)  T(F): 97.4 (07-09-20 @ 10:55), Max: 97.7 (07-09-20 @ 00:13)  HR: 75 (07-09-20 @ 10:55) (71 - 80)  BP: 116/67 (07-09-20 @ 10:55) (110/67 - 125/67)  RR: 17 (07-09-20 @ 10:55) (17 - 18)  SpO2: 98% (07-09-20 @ 10:55) (97% - 100%)  Wt(kg): --    PHYSICAL EXAM:    GENERAL: not in any distress  HEENT: Neck is supple, normocephalic, atraumatic   CHEST/LUNG: Clear to percussion bilaterally; No rales, rhonchi, wheezing  HEART: Regular rate and rhythm; No murmurs, rubs, or gallops  ABDOMEN: Soft, Nontender, Nondistended; Bowel sounds present, no rebound   EXTREMITIES:  2+ Peripheral Pulses, No clubbing, cyanosis, or edema  GENITOURINARY:   SKIN: No rashes or lesions  BACK: no pressor sore   NERVOUS SYSTEM:  Alert & Oriented X3     LABS:                         12.4   4.52  )-----------( 277      ( 09 Jul 2020 07:50 )             35.7     07-09    135  |  101  |  12  ----------------------------<  197<H>  4.3   |  28  |  0.78    Ca    8.8      09 Jul 2020 07:50    TPro  7.5  /  Alb  3.2<L>  /  TBili  0.8  /  DBili  x   /  AST  50<H>  /  ALT  30  /  AlkPhos  74  07-09    LIVER FUNCTIONS - ( 09 Jul 2020 07:50 )  Alb: 3.2 g/dL / Pro: 7.5 gm/dL / ALK PHOS: 74 U/L / ALT: 30 U/L / AST: 50 U/L / GGT: x                                                 Radiology:

## 2020-07-09 NOTE — PROGRESS NOTE ADULT - SUBJECTIVE AND OBJECTIVE BOX
Patient is a 60y old  Male who presents with a chief complaint of near syncope (07 Jul 2020 21:13)      INTERVAL HPI/OVERNIGHT EVENTS:  Pt was seen and examined, no acute events.    MEDICATIONS  (STANDING):  dextrose 5%. 1000 milliLiter(s) (50 mL/Hr) IV Continuous <Continuous>  dextrose 50% Injectable 12.5 Gram(s) IV Push once  dextrose 50% Injectable 25 Gram(s) IV Push once  dextrose 50% Injectable 25 Gram(s) IV Push once  enoxaparin Injectable 40 milliGRAM(s) SubCutaneous daily  insulin glargine Injectable (LANTUS) 10 Unit(s) SubCutaneous at bedtime  insulin lispro (HumaLOG) corrective regimen sliding scale   SubCutaneous three times a day before meals  insulin lispro Injectable (HumaLOG) 5 Unit(s) SubCutaneous three times a day before meals  isoniazid 300 milliGRAM(s) Oral daily  pyridoxine 50 milliGRAM(s) Oral daily  rifAMPin 600 milliGRAM(s) Oral daily  sodium chloride 3%  Inhalation 4 milliLiter(s) Inhalation daily    MEDICATIONS  (PRN):  dextrose 40% Gel 15 Gram(s) Oral once PRN Blood Glucose LESS THAN 70 milliGRAM(s)/deciliter  glucagon  Injectable 1 milliGRAM(s) IntraMuscular once PRN Glucose LESS THAN 70 milligrams/deciliter      Allergies  No Known Allergies    Vital Signs Last 24 Hrs  T(C): 37.3 (09 Jul 2020 16:25), Max: 37.3 (09 Jul 2020 16:25)  T(F): 99.2 (09 Jul 2020 16:25), Max: 99.2 (09 Jul 2020 16:25)  HR: 86 (09 Jul 2020 16:25) (71 - 86)  BP: 188/94 (09 Jul 2020 16:25) (113/63 - 188/94)  BP(mean): --  RR: 18 (09 Jul 2020 16:25) (17 - 18)  SpO2: 95% (09 Jul 2020 16:25) (95% - 100%)      PHYSICAL EXAM:  GENERAL: NAD  HEAD:  Atraumatic  EYES: PERRLA  NERVOUS SYSTEM:  Awake, alert  CHEST/LUNG: Clear  HEART: RRR  ABDOMEN: Soft, non tender  EXTREMITIES:  no edema      LABS:                        12.4   4.52  )-----------( 277      ( 09 Jul 2020 07:50 )             35.7     07-09    135  |  101  |  12  ----------------------------<  197<H>  4.3   |  28  |  0.78    Ca    8.8      09 Jul 2020 07:50    TPro  7.5  /  Alb  3.2<L>  /  TBili  0.8  /  DBili  x   /  AST  50<H>  /  ALT  30  /  AlkPhos  74  07-09        CAPILLARY BLOOD GLUCOSE      POCT Blood Glucose.: 166 mg/dL (09 Jul 2020 16:50)  POCT Blood Glucose.: 269 mg/dL (09 Jul 2020 11:29)  POCT Blood Glucose.: 232 mg/dL (09 Jul 2020 07:43)  POCT Blood Glucose.: 268 mg/dL (08 Jul 2020 22:03)      RADIOLOGY & ADDITIONAL TESTS:    Imaging Personally Reviewed:  [ ] YES  [ ] NO    Consultant(s) Notes Reviewed:  [ ] YES  [ ] NO    Care Discussed with Consultants/Other Providers [ ] YES  [ ] NO

## 2020-07-10 LAB
ALBUMIN SERPL ELPH-MCNC: 3 G/DL — LOW (ref 3.3–5)
ALP SERPL-CCNC: 66 U/L — SIGNIFICANT CHANGE UP (ref 40–120)
ALT FLD-CCNC: 31 U/L — SIGNIFICANT CHANGE UP (ref 12–78)
ANION GAP SERPL CALC-SCNC: 5 MMOL/L — SIGNIFICANT CHANGE UP (ref 5–17)
AST SERPL-CCNC: 54 U/L — HIGH (ref 15–37)
BILIRUB SERPL-MCNC: 0.7 MG/DL — SIGNIFICANT CHANGE UP (ref 0.2–1.2)
BUN SERPL-MCNC: 12 MG/DL — SIGNIFICANT CHANGE UP (ref 7–23)
CALCIUM SERPL-MCNC: 8.7 MG/DL — SIGNIFICANT CHANGE UP (ref 8.5–10.1)
CHLORIDE SERPL-SCNC: 102 MMOL/L — SIGNIFICANT CHANGE UP (ref 96–108)
CO2 SERPL-SCNC: 30 MMOL/L — SIGNIFICANT CHANGE UP (ref 22–31)
CREAT SERPL-MCNC: 0.71 MG/DL — SIGNIFICANT CHANGE UP (ref 0.5–1.3)
GAMMA INTERFERON BACKGROUND BLD IA-ACNC: 0.29 IU/ML — SIGNIFICANT CHANGE UP
GLUCOSE BLDC GLUCOMTR-MCNC: 124 MG/DL — HIGH (ref 70–99)
GLUCOSE BLDC GLUCOMTR-MCNC: 127 MG/DL — HIGH (ref 70–99)
GLUCOSE BLDC GLUCOMTR-MCNC: 136 MG/DL — HIGH (ref 70–99)
GLUCOSE BLDC GLUCOMTR-MCNC: 355 MG/DL — HIGH (ref 70–99)
GLUCOSE SERPL-MCNC: 86 MG/DL — SIGNIFICANT CHANGE UP (ref 70–99)
HCT VFR BLD CALC: 33.4 % — LOW (ref 39–50)
HGB BLD-MCNC: 11.6 G/DL — LOW (ref 13–17)
M TB IFN-G BLD-IMP: POSITIVE
M TB IFN-G CD4+ BCKGRND COR BLD-ACNC: 7.21 IU/ML — SIGNIFICANT CHANGE UP
M TB IFN-G CD4+CD8+ BCKGRND COR BLD-ACNC: 7.03 IU/ML — SIGNIFICANT CHANGE UP
MCHC RBC-ENTMCNC: 23.4 PG — LOW (ref 27–34)
MCHC RBC-ENTMCNC: 34.7 GM/DL — SIGNIFICANT CHANGE UP (ref 32–36)
MCV RBC AUTO: 67.3 FL — LOW (ref 80–100)
NIGHT BLUE STAIN TISS: SIGNIFICANT CHANGE UP
NRBC # BLD: 2 /100 WBCS — HIGH (ref 0–0)
PLATELET # BLD AUTO: 274 K/UL — SIGNIFICANT CHANGE UP (ref 150–400)
POTASSIUM SERPL-MCNC: 4.1 MMOL/L — SIGNIFICANT CHANGE UP (ref 3.5–5.3)
POTASSIUM SERPL-SCNC: 4.1 MMOL/L — SIGNIFICANT CHANGE UP (ref 3.5–5.3)
PROT SERPL-MCNC: 7 GM/DL — SIGNIFICANT CHANGE UP (ref 6–8.3)
QUANT TB PLUS MITOGEN MINUS NIL: 7.39 IU/ML — SIGNIFICANT CHANGE UP
RBC # BLD: 4.96 M/UL — SIGNIFICANT CHANGE UP (ref 4.2–5.8)
RBC # FLD: 17.8 % — HIGH (ref 10.3–14.5)
SODIUM SERPL-SCNC: 137 MMOL/L — SIGNIFICANT CHANGE UP (ref 135–145)
SPECIMEN SOURCE: SIGNIFICANT CHANGE UP
WBC # BLD: 4.79 K/UL — SIGNIFICANT CHANGE UP (ref 3.8–10.5)
WBC # FLD AUTO: 4.79 K/UL — SIGNIFICANT CHANGE UP (ref 3.8–10.5)

## 2020-07-10 PROCEDURE — 99233 SBSQ HOSP IP/OBS HIGH 50: CPT

## 2020-07-10 RX ADMIN — Medication 5 UNIT(S): at 16:56

## 2020-07-10 RX ADMIN — SODIUM CHLORIDE 4 MILLILITER(S): 9 INJECTION INTRAMUSCULAR; INTRAVENOUS; SUBCUTANEOUS at 12:30

## 2020-07-10 RX ADMIN — Medication 5 UNIT(S): at 11:54

## 2020-07-10 NOTE — PROGRESS NOTE ADULT - SUBJECTIVE AND OBJECTIVE BOX
HPI:  60 yr old diagnosed of latent TB and started on Rifampin and Isoniazid 3 months ago while in [facility]. He came to [place] 2 weeks ago and the dgtr has noticed 20 pds wt loss, weakness, poor appetite, constantly thirsty, increased urination. They attributed it to the TB medications SE. They did take him to their PMD, who recommended that he go to ED. However patient passed out- slumped over while sitting in a chair and did not respond for couple of minutes. Upon shaking after he came awake and said he was fine. Dgtr brought him to ED with concerns.     In ED noted severe hyperglycemia and not in DKA. IVF and Insulin given. (08 Jul 2020 16:55)      Allergies    No Known Allergies    Intolerances        MEDICATIONS  (STANDING):  dextrose 5%. 1000 milliLiter(s) (50 mL/Hr) IV Continuous <Continuous>  dextrose 50% Injectable 12.5 Gram(s) IV Push once  dextrose 50% Injectable 25 Gram(s) IV Push once  dextrose 50% Injectable 25 Gram(s) IV Push once  enoxaparin Injectable 40 milliGRAM(s) SubCutaneous daily  insulin glargine Injectable (LANTUS) 10 Unit(s) SubCutaneous at bedtime  insulin lispro (HumaLOG) corrective regimen sliding scale   SubCutaneous three times a day before meals  insulin lispro Injectable (HumaLOG) 5 Unit(s) SubCutaneous three times a day before meals  isoniazid 300 milliGRAM(s) Oral daily  pyridoxine 50 milliGRAM(s) Oral daily  rifAMPin 600 milliGRAM(s) Oral daily  sodium chloride 3%  Inhalation 4 milliLiter(s) Inhalation daily    MEDICATIONS  (PRN):  dextrose 40% Gel 15 Gram(s) Oral once PRN Blood Glucose LESS THAN 70 milliGRAM(s)/deciliter  glucagon  Injectable 1 milliGRAM(s) IntraMuscular once PRN Glucose LESS THAN 70 milligrams/deciliter      REVIEW OF SYSTEMS:    CONSTITUTIONAL: No fever, chills, weight loss, or fatigue  HEENT: No sore throat, runny nose, ear ache  RESPIRATORY: No cough, wheezing, No shortness of breath  CARDIOVASCULAR: No chest pain, palpitations, dizziness  GASTROINTESTINAL: No abdominal pain. No nausea, vomiting, diarrhea  GENITOURINARY: No dysuria, increase frequency, hematuria, or incontinence  NEUROLOGICAL: No headaches, memory loss, loss of strength, numbness, or tremors, no weakness  EXTREMITY: No pedal edema BLE  SKIN: No itching, burning, rashes, or lesions     VITAL SIGNS:  T(C): 36.2 (07-10-20 @ 11:25), Max: 37.3 (07-09-20 @ 16:25)  T(F): 97.2 (07-10-20 @ 11:25), Max: 99.2 (07-09-20 @ 16:25)  HR: 76 (07-10-20 @ 11:25) (76 - 87)  BP: 103/55 (07-10-20 @ 11:25) (100/60 - 188/94)  RR: 16 (07-10-20 @ 11:25) (16 - 18)  SpO2: 97% (07-10-20 @ 11:25) (95% - 99%)  Wt(kg): --    PHYSICAL EXAM:    GENERAL: not in any distress  HEENT: Neck is supple, normocephalic, atraumatic   CHEST/LUNG: Clear to percussion bilaterally; No rales, rhonchi, wheezing  HEART: Regular rate and rhythm; No murmurs, rubs, or gallops  ABDOMEN: Soft, Nontender, Nondistended; Bowel sounds present, no rebound   EXTREMITIES:  2+ Peripheral Pulses, No clubbing, cyanosis, or edema  GENITOURINARY:   SKIN: No rashes or lesions  BACK: no pressor sore   NERVOUS SYSTEM:  Alert & Oriented X3, Good concentration  PSYCH: normal affect     LABS:                         11.6   4.79  )-----------( 274      ( 10 Jul 2020 06:36 )             33.4     07-10    137  |  102  |  12  ----------------------------<  86  4.1   |  30  |  0.71    Ca    8.7      10 Jul 2020 06:36    TPro  7.0  /  Alb  3.0<L>  /  TBili  0.7  /  DBili  x   /  AST  54<H>  /  ALT  31  /  AlkPhos  66  07-10    LIVER FUNCTIONS - ( 10 Jul 2020 06:36 )  Alb: 3.0 g/dL / Pro: 7.0 gm/dL / ALK PHOS: 66 U/L / ALT: 31 U/L / AST: 54 U/L / GGT: x                                                 Radiology:

## 2020-07-10 NOTE — PROGRESS NOTE ADULT - SUBJECTIVE AND OBJECTIVE BOX
Patient is a 60y old  Male who presents with a chief complaint of near syncope (07 Jul 2020 21:13)    INTERVAL HPI/OVERNIGHT EVENTS:  Pt was seen and examined, no acute events.    MEDICATIONS  (STANDING):  dextrose 5%. 1000 milliLiter(s) (50 mL/Hr) IV Continuous <Continuous>  dextrose 50% Injectable 12.5 Gram(s) IV Push once  dextrose 50% Injectable 25 Gram(s) IV Push once  dextrose 50% Injectable 25 Gram(s) IV Push once  enoxaparin Injectable 40 milliGRAM(s) SubCutaneous daily  insulin glargine Injectable (LANTUS) 10 Unit(s) SubCutaneous at bedtime  insulin lispro (HumaLOG) corrective regimen sliding scale   SubCutaneous three times a day before meals  insulin lispro Injectable (HumaLOG) 5 Unit(s) SubCutaneous three times a day before meals  isoniazid 300 milliGRAM(s) Oral daily  pyridoxine 50 milliGRAM(s) Oral daily  rifAMPin 600 milliGRAM(s) Oral daily  sodium chloride 3%  Inhalation 4 milliLiter(s) Inhalation daily    MEDICATIONS  (PRN):  dextrose 40% Gel 15 Gram(s) Oral once PRN Blood Glucose LESS THAN 70 milliGRAM(s)/deciliter  glucagon  Injectable 1 milliGRAM(s) IntraMuscular once PRN Glucose LESS THAN 70 milligrams/deciliter      Allergies  No Known Allergies      Vital Signs Last 24 Hrs  T(C): 36.2 (10 Jul 2020 11:25), Max: 37.3 (09 Jul 2020 16:25)  T(F): 97.2 (10 Jul 2020 11:25), Max: 99.2 (09 Jul 2020 16:25)  HR: 76 (10 Jul 2020 11:25) (76 - 87)  BP: 103/55 (10 Jul 2020 11:25) (100/60 - 188/94)  BP(mean): --  RR: 16 (10 Jul 2020 11:25) (16 - 18)  SpO2: 97% (10 Jul 2020 11:25) (95% - 99%)    PHYSICAL EXAM:  GENERAL: NAD  HEAD:  Atraumatic  EYES: PERRLA  NERVOUS SYSTEM:  Awake, alert  CHEST/LUNG: Clear  HEART: RRR  ABDOMEN: Soft, non tender  EXTREMITIES:  no edema    LABS:                        11.6   4.79  )-----------( 274      ( 10 Jul 2020 06:36 )             33.4     07-10    137  |  102  |  12  ----------------------------<  86  4.1   |  30  |  0.71    Ca    8.7      10 Jul 2020 06:36    TPro  7.0  /  Alb  3.0<L>  /  TBili  0.7  /  DBili  x   /  AST  54<H>  /  ALT  31  /  AlkPhos  66  07-10        CAPILLARY BLOOD GLUCOSE      POCT Blood Glucose.: 355 mg/dL (10 Jul 2020 11:30)  POCT Blood Glucose.: 124 mg/dL (10 Jul 2020 07:42)  POCT Blood Glucose.: 126 mg/dL (09 Jul 2020 22:57)  POCT Blood Glucose.: 166 mg/dL (09 Jul 2020 16:50)      Culture - Acid Fast - Sputum w/Smear (collected 09 Jul 2020 15:03)  Source: .Sputum Sputum conical      RADIOLOGY & ADDITIONAL TESTS:    Imaging Personally Reviewed:  [ ] YES  [ ] NO    Consultant(s) Notes Reviewed:  [ ] YES  [ ] NO    Care Discussed with Consultants/Other Providers [ ] YES  [ ] NO

## 2020-07-10 NOTE — PROGRESS NOTE ADULT - ASSESSMENT
60 yr old with Latent TB from [facility] with wt loss, constant thirst, poor appetite, weakness and increased urination .    Cavitary lesion in CT chest:  - H/O latent TB on meds since march  - As per daughter no H/o COVID-19 , Neg Ab  - Now on rifampin, and INH, B6, will switch to treatment regimen if ID agrees  - Check sputum for AFB X 3  - Positive QuantiFeron   - ID following pulm consulted    Syncope:  - Check orthostasis, likely vasovagal   - In the setting of dehydration uncontrolled DM, viral illness  - PE ruled out  - CT head neg  - Check 2D echo    Uncontrolled DM with hyperglycemia:  - DKA ruled out  - Hb A1c 10.6  - Increased pre meal Humalog to 5 mg TID, continue Lantus    Pseudohyponatremia:  - Corrected Na ok      Severe protein-calorie malnutrition:  - Nutritional supplements.       MERCEDES:  - Resolved, stop IVF      DVT ppx    Discussed with daughter on phone.

## 2020-07-10 NOTE — CONSULT NOTE ADULT - SUBJECTIVE AND OBJECTIVE BOX
Patient is a 60y old  Male who presents with a chief complaint of near syncope (07 Jul 2020 21:13)    HPI:  60 yr male, reports being  diagnosed of latent TB(not clear how) and started on Rifampin and Isoniazid 3 months ago while in [facility]. Reports had cough and fever in April.  [place], went to [facility] in January in January but could not return due to COVID.  Came to [place] 2 weeks ago,  daughter noticed 20 pound  wt loss, weakness, poor appetite, constantly thirsty, increased urination. They attributed it to the TB medications Took  him to  PMD, who recommended that he go to ED, passed out-( slumped over while sitting in a chair ) and did not respond for couple of minutes.  In ED noted severe hyperglycemia(new diagnosis of DM) but not in DKA. IVF and Insulin given. (08 Jul 2020 16:55).  AS above had cough and fever in April which improved after getting started on INH+ Rifampin.   Presently denies fever, chills,  cough , sputum production or night sweats.    PAST MEDICAL & SURGICAL HISTORY:  TB (tuberculosis)  No significant past surgical history    Allergies  No Known Allergies    MEDICATIONS  (STANDING):  dextrose 5%. 1000 milliLiter(s) (50 mL/Hr) IV Continuous <Continuous>  dextrose 50% Injectable 12.5 Gram(s) IV Push once  dextrose 50% Injectable 25 Gram(s) IV Push once  dextrose 50% Injectable 25 Gram(s) IV Push once  enoxaparin Injectable 40 milliGRAM(s) SubCutaneous daily  insulin glargine Injectable (LANTUS) 10 Unit(s) SubCutaneous at bedtime  insulin lispro (HumaLOG) corrective regimen sliding scale   SubCutaneous three times a day before meals  insulin lispro Injectable (HumaLOG) 5 Unit(s) SubCutaneous three times a day before meals  isoniazid 300 milliGRAM(s) Oral daily  pyridoxine 50 milliGRAM(s) Oral daily  rifAMPin 600 milliGRAM(s) Oral daily  sodium chloride 3%  Inhalation 4 milliLiter(s) Inhalation daily    MEDICATIONS  (PRN):  dextrose 40% Gel 15 Gram(s) Oral once PRN Blood Glucose LESS THAN 70 milliGRAM(s)/deciliter  glucagon  Injectable 1 milliGRAM(s) IntraMuscular once PRN Glucose LESS THAN 70 milligrams/deciliter    Vital Signs Last 24 Hrs  T(C): 36.7 (10 Jul 2020 16:24), Max: 37.1 (09 Jul 2020 20:35)  T(F): 98.1 (10 Jul 2020 16:24), Max: 98.8 (09 Jul 2020 20:35)  HR: 80 (10 Jul 2020 16:24) (76 - 87)  BP: 103/57 (10 Jul 2020 16:24) (100/60 - 118/65)  BP(mean): --  RR: 16 (10 Jul 2020 16:24) (16 - 18)  SpO2: 97% (10 Jul 2020 16:24) (97% - 99%)    PHYSICAL EXAM:  GEN:         Awake, responsive and comfortable.  HEENT:    Normal.    RESP:         no distress  CVS:          Regular rate and rhythm.   ABD:         Soft, non-tender, non-distended;   SKIN:           Warm and dry.  EXTR:            No clubbing, cyanosis or edema  CNS:              Intact sensory and motor function.  PSYCH:        cooperative, no anxiety or depression    LABS:                        11.6   4.79  )-----------( 274      ( 10 Jul 2020 06:36 )             33.4     07-10    137  |  102  |  12  ----------------------------<  86  4.1   |  30  |  0.71    Ca    8.7      10 Jul 2020 06:36    TPro  7.0  /  Alb  3.0<L>  /  TBili  0.7  /  DBili  x   /  AST  54<H>  /  ALT  31  /  AlkPhos  66  07-10    Culture - Acid Fast - Sputum w/Smear (collected 07-09-20 @ 15:03)  Source: .Sputum Sputum conical    EKG: sinus rhythm    RADIOLOGY & ADDITIONAL STUDIES:  < from: CT Angio Chest w/ IV Cont (07.06.20 @ 14:41) >  EXAM:  CT ABDOMEN AND PELVIS IC                          EXAM:  CT ANGIO CHEST (W)AW IC                          PROCEDURE DATE:  07/06/2020      INTERPRETATION:  CLINICAL INFORMATION: Syncope and weakness    COMPARISON: None.    PROCEDURE:   CT Angiography of the Chest was performed followed by portal venous phase imaging of the Abdomen and Pelvis.  IV Contrast: 95 ml of Omnipaque 350 was injected intravenously. 5 ml were discarded.  Oral contrast: None.  Sagittal and coronal reformats were performed as well as 3D (MIP) reconstructions.    FINDINGS:  CHEST:   LUNGS AND LARGE AIRWAYS: Patent central airways. Right upper lobe opacities. Dependent and basilar atelectasis.  PLEURA: No pleural effusion.  VESSELS: No thoracic aortic aneurysm or dissection. Multiple images are degraded by respiratory motion. Excluding regions with motion artifact, there are no pulmonary arterial filling defects identified.  HEART: Heart size is normal. No pericardial effusion.  MEDIASTINUM AND TRAY: No lymphadenopathy.  CHEST WALL AND LOWER NECK: Within normal limits.    ABDOMEN AND PELVIS:  LIVER: Within normal limits.  BILE DUCTS: Normal caliber.  GALLBLADDER: Within normal limits.  SPLEEN: Within normal limits.  PANCREAS: Within normal limits.  ADRENALS: Within normal limits.  KIDNEYS/URETERS: Nonobstructive bilateral intrarenal calculi measuring up to 2 mm. No hydronephrosis.    BLADDER: Within normal limits.  REPRODUCTIVE ORGANS: Prostate within normal limits.    BOWEL: No bowel obstruction. Appendix visualized portions appear within normal limits.  PERITONEUM: No ascites.  VESSELS: Within normal limits.  RETROPERITONEUM/LYMPH NODES: No lymphadenopathy.    ABDOMINAL WALL: Within normal limits.  BONES: Mild degenerative changes.    IMPRESSION:   Right upper lobe opacities, possibly infectious. Recommend continued follow-up to resolution.  Negative for pulmonary embolism.  Bilateral nonobstructive nephrolithiasis. No hydronephrosis.      GRACE SALVADOR M.D., ATTENDING RADIOLOGIST  This document has been electronically signed. Jul 6 2020  3:08PM    ASSESSMENT AND PLAN:  ·	RUL opacities suspicious for TB in a pt from [facility] with +ve QuantiFeron Gold and weight loss.  ·	Newly diagnosed DM  ·	Hyperglycemia.  ·	New DM  ·	Dehydration.    Induced sputum AFB negative x1.  Follow remaining 2 smears.  Will discuss CT findings with chest Radiology, and treatment plan with ID.  Sedrate level in am.

## 2020-07-10 NOTE — PROGRESS NOTE ADULT - ASSESSMENT
here for new Dx DM with uncontrolled Hyperglycemia due to diabetes mellitus.  Plan: - likely secondary to COVID-19 (pt. was diagnosed with COVID in March    Latent TB on prophylaxis for 3 months from [facility] r/o Active /reactivate ? Tuberculosis   never catch covid as per primary hospitalist   no cough on exam   repeat covid 19 pcr neg  covid 19 antibody neg  CT revealed Right upper lobe opacities, possibly infectious. Recommend continued follow-up to resolution.  Negative for pulmonary embolism.  Bilateral nonobstructive nephrolithiasis. No hydronephrosis.  will send sputum AFB x3   continue isolation   continue TB prophylaxis and vit B6   pt refused HIV test to be tested   fu AFB , spoke with floor RN to send today and tomorrow , sputum induction order in   not in any res distress no cough on exam , clinically ok    discussed with primary attending for Pulmonary consult for evaluation of CT scan

## 2020-07-11 LAB
ALBUMIN SERPL ELPH-MCNC: 3 G/DL — LOW (ref 3.3–5)
ALP SERPL-CCNC: 67 U/L — SIGNIFICANT CHANGE UP (ref 40–120)
ALT FLD-CCNC: 39 U/L — SIGNIFICANT CHANGE UP (ref 12–78)
ANION GAP SERPL CALC-SCNC: 6 MMOL/L — SIGNIFICANT CHANGE UP (ref 5–17)
AST SERPL-CCNC: 74 U/L — HIGH (ref 15–37)
BILIRUB SERPL-MCNC: 0.7 MG/DL — SIGNIFICANT CHANGE UP (ref 0.2–1.2)
BUN SERPL-MCNC: 14 MG/DL — SIGNIFICANT CHANGE UP (ref 7–23)
CALCIUM SERPL-MCNC: 8.6 MG/DL — SIGNIFICANT CHANGE UP (ref 8.5–10.1)
CHLORIDE SERPL-SCNC: 102 MMOL/L — SIGNIFICANT CHANGE UP (ref 96–108)
CO2 SERPL-SCNC: 30 MMOL/L — SIGNIFICANT CHANGE UP (ref 22–31)
CREAT SERPL-MCNC: 0.73 MG/DL — SIGNIFICANT CHANGE UP (ref 0.5–1.3)
ERYTHROCYTE [SEDIMENTATION RATE] IN BLOOD: 5 MM/HR — SIGNIFICANT CHANGE UP (ref 0–20)
GLUCOSE BLDC GLUCOMTR-MCNC: 136 MG/DL — HIGH (ref 70–99)
GLUCOSE BLDC GLUCOMTR-MCNC: 146 MG/DL — HIGH (ref 70–99)
GLUCOSE BLDC GLUCOMTR-MCNC: 264 MG/DL — HIGH (ref 70–99)
GLUCOSE BLDC GLUCOMTR-MCNC: 424 MG/DL — HIGH (ref 70–99)
GLUCOSE BLDC GLUCOMTR-MCNC: 450 MG/DL — CRITICAL HIGH (ref 70–99)
GLUCOSE SERPL-MCNC: 99 MG/DL — SIGNIFICANT CHANGE UP (ref 70–99)
HCT VFR BLD CALC: 32.5 % — LOW (ref 39–50)
HGB BLD-MCNC: 11.3 G/DL — LOW (ref 13–17)
MCHC RBC-ENTMCNC: 23.2 PG — LOW (ref 27–34)
MCHC RBC-ENTMCNC: 34.8 GM/DL — SIGNIFICANT CHANGE UP (ref 32–36)
MCV RBC AUTO: 66.6 FL — LOW (ref 80–100)
NIGHT BLUE STAIN TISS: SIGNIFICANT CHANGE UP
NRBC # BLD: 3 /100 WBCS — HIGH (ref 0–0)
PLATELET # BLD AUTO: 295 K/UL — SIGNIFICANT CHANGE UP (ref 150–400)
POTASSIUM SERPL-MCNC: 4 MMOL/L — SIGNIFICANT CHANGE UP (ref 3.5–5.3)
POTASSIUM SERPL-SCNC: 4 MMOL/L — SIGNIFICANT CHANGE UP (ref 3.5–5.3)
PROT SERPL-MCNC: 7.2 GM/DL — SIGNIFICANT CHANGE UP (ref 6–8.3)
RBC # BLD: 4.88 M/UL — SIGNIFICANT CHANGE UP (ref 4.2–5.8)
RBC # FLD: 17.9 % — HIGH (ref 10.3–14.5)
SODIUM SERPL-SCNC: 138 MMOL/L — SIGNIFICANT CHANGE UP (ref 135–145)
SPECIMEN SOURCE: SIGNIFICANT CHANGE UP
WBC # BLD: 4.77 K/UL — SIGNIFICANT CHANGE UP (ref 3.8–10.5)
WBC # FLD AUTO: 4.77 K/UL — SIGNIFICANT CHANGE UP (ref 3.8–10.5)

## 2020-07-11 PROCEDURE — 99233 SBSQ HOSP IP/OBS HIGH 50: CPT

## 2020-07-11 RX ORDER — INSULIN GLARGINE 100 [IU]/ML
24 INJECTION, SOLUTION SUBCUTANEOUS AT BEDTIME
Refills: 0 | Status: DISCONTINUED | OUTPATIENT
Start: 2020-07-11 | End: 2020-07-17

## 2020-07-11 RX ORDER — INSULIN GLARGINE 100 [IU]/ML
15 INJECTION, SOLUTION SUBCUTANEOUS AT BEDTIME
Refills: 0 | Status: DISCONTINUED | OUTPATIENT
Start: 2020-07-11 | End: 2020-07-11

## 2020-07-11 RX ORDER — INSULIN LISPRO 100/ML
8 VIAL (ML) SUBCUTANEOUS
Refills: 0 | Status: DISCONTINUED | OUTPATIENT
Start: 2020-07-11 | End: 2020-07-18

## 2020-07-11 RX ADMIN — Medication 5 UNIT(S): at 11:36

## 2020-07-11 RX ADMIN — INSULIN GLARGINE 24 UNIT(S): 100 INJECTION, SOLUTION SUBCUTANEOUS at 21:26

## 2020-07-11 RX ADMIN — SODIUM CHLORIDE 4 MILLILITER(S): 9 INJECTION INTRAMUSCULAR; INTRAVENOUS; SUBCUTANEOUS at 13:06

## 2020-07-11 RX ADMIN — Medication 8 UNIT(S): at 16:49

## 2020-07-11 NOTE — PROGRESS NOTE ADULT - SUBJECTIVE AND OBJECTIVE BOX
Patient is a 60y old  Male who presents with a chief complaint of near syncope (07 Jul 2020 21:13)    INTERVAL HPI/OVERNIGHT EVENTS:  Pt was seen and examined, no acute events.    MEDICATIONS  (STANDING):  dextrose 5%. 1000 milliLiter(s) (50 mL/Hr) IV Continuous <Continuous>  dextrose 50% Injectable 12.5 Gram(s) IV Push once  dextrose 50% Injectable 25 Gram(s) IV Push once  dextrose 50% Injectable 25 Gram(s) IV Push once  enoxaparin Injectable 40 milliGRAM(s) SubCutaneous daily  insulin glargine Injectable (LANTUS) 15 Unit(s) SubCutaneous at bedtime  insulin lispro (HumaLOG) corrective regimen sliding scale   SubCutaneous three times a day before meals  insulin lispro Injectable (HumaLOG) 8 Unit(s) SubCutaneous three times a day before meals  isoniazid 300 milliGRAM(s) Oral daily  pyridoxine 50 milliGRAM(s) Oral daily  rifAMPin 600 milliGRAM(s) Oral daily  sodium chloride 3%  Inhalation 4 milliLiter(s) Inhalation daily    MEDICATIONS  (PRN):  dextrose 40% Gel 15 Gram(s) Oral once PRN Blood Glucose LESS THAN 70 milliGRAM(s)/deciliter  glucagon  Injectable 1 milliGRAM(s) IntraMuscular once PRN Glucose LESS THAN 70 milligrams/deciliter      Allergies  No Known Allergies      Vital Signs Last 24 Hrs  T(C): 36.5 (11 Jul 2020 12:16), Max: 36.8 (11 Jul 2020 00:05)  T(F): 97.7 (11 Jul 2020 12:16), Max: 98.2 (11 Jul 2020 00:05)  HR: 109 (11 Jul 2020 13:07) (72 - 109)  BP: 113/63 (11 Jul 2020 12:16) (102/50 - 117/69)  BP(mean): --  RR: 16 (11 Jul 2020 12:16) (16 - 18)  SpO2: 98% (11 Jul 2020 13:07) (97% - 99%)      PHYSICAL EXAM:  GENERAL: NAD  HEAD: Atraumatic  EYES: PERRLA  NERVOUS SYSTEM:  Awake, alert  CHEST/LUNG: Clear  HEART: RRR  ABDOMEN: Soft, non tender  EXTREMITIES:  no edema      LABS:                        11.3   4.77  )-----------( 295      ( 11 Jul 2020 06:45 )             32.5     07-11    138  |  102  |  14  ----------------------------<  99  4.0   |  30  |  0.73    Ca    8.6      11 Jul 2020 06:45    TPro  7.2  /  Alb  3.0<L>  /  TBili  0.7  /  DBili  x   /  AST  74<H>  /  ALT  39  /  AlkPhos  67  07-11        CAPILLARY BLOOD GLUCOSE      POCT Blood Glucose.: 424 mg/dL (11 Jul 2020 11:32)  POCT Blood Glucose.: 450 mg/dL (11 Jul 2020 11:30)  POCT Blood Glucose.: 136 mg/dL (11 Jul 2020 08:26)  POCT Blood Glucose.: 127 mg/dL (10 Jul 2020 22:09)  POCT Blood Glucose.: 136 mg/dL (10 Jul 2020 16:53)      Culture - Acid Fast - Sputum w/Smear (collected 10 Jul 2020 19:26)  Source: .Sputum Sputum    Culture - Acid Fast - Sputum w/Smear (collected 09 Jul 2020 15:03)  Source: .Sputum Sputum conical      RADIOLOGY & ADDITIONAL TESTS:    Imaging Personally Reviewed:  [ ] YES  [ ] NO    Consultant(s) Notes Reviewed:  [ ] YES  [ ] NO    Care Discussed with Consultants/Other Providers [ ] YES  [ ] NO

## 2020-07-11 NOTE — CONSULT NOTE ADULT - PROBLEM SELECTOR RECOMMENDATION 9
increase Lantus to 24 units   addition of Metformin might help decrease Insulin Resistance and glucose toxicity   Continue with the same regimen while inpatient otherwise   while inpatient Finger Sticks should be in 140-180 range, preferably <160   Encourage more nutrition   Thank You for the courtesy of this consultation !!!

## 2020-07-11 NOTE — PROGRESS NOTE ADULT - ASSESSMENT
60 yr old with Latent TB from [facility] with wt loss, constant thirst, poor appetite, weakness and increased urination .    Cavitary lesion in CT chest:  - H/O latent TB on meds since march  - As per daughter no H/o COVID-19 , Neg Ab  - Now on rifampin, and INH, B6, will switch to treatment regimen if ID agrees  - Check sputum for AFB X 3, neg X 2 with sputum indiction   - Positive QuantiFeron   - ID following pulm consulted    Syncope:  - Likely vasovagal   - In the setting of dehydration uncontrolled DM, viral illness  - PE ruled out  - CT head neg  - 2D echo with preserved EF, no acute path    Uncontrolled DM with hyperglycemia:  - DKA ruled out  - Hb A1c 10.6  - Increased pre meal Humalog and Lantus dose  - Endo eval    Pseudohyponatremia:  - Corrected Na ok      Severe protein-calorie malnutrition:  - Nutritional supplements.       MERCEDES:  - Resolved, stop IVF      DVT ppx    Discussed with daughter on phone. 60 yr old with Latent TB from [facility] with wt loss, constant thirst, poor appetite, weakness and increased urination .    R/O Pulm TB:  - H/O latent TB on meds since march  - As per daughter no H/o COVID-19 , Neg Ab  - CT with rt upper lobe infiltrate  - Now on rifampin, and INH, B6, will switch to treatment regimen if ID agrees  - Check sputum for AFB X 3, neg X 2 with sputum indiction   - Positive QuantiFeron   - ID following pulm consulted    Syncope:  - Likely vasovagal   - In the setting of dehydration uncontrolled DM, viral illness  - PE ruled out  - CT head neg  - 2D echo with preserved EF, no acute path    Uncontrolled DM with hyperglycemia:  - DKA ruled out  - Hb A1c 10.6  - Increased pre meal Humalog and Lantus dose  - Endo eval    Pseudohyponatremia:  - Corrected Na ok      Severe protein-calorie malnutrition:  - Nutritional supplements.       MERCEDES:  - Resolved, stop IVF  - B/L non obstructing stone, outpt follow up     DVT ppx    Discussed with daughter on phone.

## 2020-07-11 NOTE — PROGRESS NOTE ADULT - SUBJECTIVE AND OBJECTIVE BOX
INTERVAL HPI:  60 yr male, reports being  diagnosed of latent TB(not clear how) and started on Rifampin and Isoniazid 3 months ago while in [facility]. Reports had cough and fever in April.  [place], went to [facility] in January in January but could not return due to COVID.  Came to [place] 2 weeks ago,  daughter noticed 20 pound  wt loss, weakness, poor appetite, constantly thirsty, increased urination. They attributed it to the TB medications Took  him to  PMD, who recommended that he go to ED, passed out-( slumped over while sitting in a chair ) and did not respond for couple of minutes.  In ED noted severe hyperglycemia(new diagnosis of DM) but not in DKA. IVF and Insulin given. (08 Jul 2020 16:55).  AS above had cough and fever in April which improved after getting started on INH+ Rifampin.   Presently denies fever, chills,  cough , sputum production or night sweats.    OVERNIGHT EVENTS:  Comfortable    Vital Signs Last 24 Hrs  T(C): 37.2 (11 Jul 2020 16:25), Max: 37.2 (11 Jul 2020 16:25)  T(F): 98.9 (11 Jul 2020 16:25), Max: 98.9 (11 Jul 2020 16:25)  HR: 84 (11 Jul 2020 16:25) (72 - 109)  BP: 113/57 (11 Jul 2020 16:25) (102/50 - 117/69)  BP(mean): --  RR: 18 (11 Jul 2020 16:25) (16 - 18)  SpO2: 97% (11 Jul 2020 16:25) (97% - 99%)    PHYSICAL EXAM:  GEN:         Awake, responsive and comfortable.  HEENT:    Normal.    RESP:        no wheezing.  CVS:             Regular rate and rhythm.   ABD:         Soft, non-tender, non-distended;     MEDICATIONS  (STANDING):  dextrose 5%. 1000 milliLiter(s) (50 mL/Hr) IV Continuous <Continuous>  dextrose 50% Injectable 12.5 Gram(s) IV Push once  dextrose 50% Injectable 25 Gram(s) IV Push once  dextrose 50% Injectable 25 Gram(s) IV Push once  enoxaparin Injectable 40 milliGRAM(s) SubCutaneous daily  insulin glargine Injectable (LANTUS) 15 Unit(s) SubCutaneous at bedtime  insulin lispro (HumaLOG) corrective regimen sliding scale   SubCutaneous three times a day before meals  insulin lispro Injectable (HumaLOG) 8 Unit(s) SubCutaneous three times a day before meals  isoniazid 300 milliGRAM(s) Oral daily  pyridoxine 50 milliGRAM(s) Oral daily  rifAMPin 600 milliGRAM(s) Oral daily  sodium chloride 3%  Inhalation 4 milliLiter(s) Inhalation daily    MEDICATIONS  (PRN):  dextrose 40% Gel 15 Gram(s) Oral once PRN Blood Glucose LESS THAN 70 milliGRAM(s)/deciliter  glucagon  Injectable 1 milliGRAM(s) IntraMuscular once PRN Glucose LESS THAN 70 milligrams/deciliter    LABS:                        11.3   4.77  )-----------( 295      ( 11 Jul 2020 06:45 )             32.5     07-11    138  |  102  |  14  ----------------------------<  99  4.0   |  30  |  0.73    Ca    8.6      11 Jul 2020 06:45    TPro  7.2  /  Alb  3.0<L>  /  TBili  0.7  /  DBili  x   /  AST  74<H>  /  ALT  39  /  AlkPhos  67  07-11    ASSESSMENT AND PLAN:  ·	RUL opacities suspicious for TB in a pt from [facility] with +ve QuantiFeron Gold and weight loss.  ·	Newly diagnosed DM  ·	Hyperglycemia.  ·	New DM  ·	Dehydration.    Clinically better.  Sputum AFB negative x2.  Sedrate is only 5 which does not go along active PTB

## 2020-07-12 LAB
ALBUMIN SERPL ELPH-MCNC: 3.1 G/DL — LOW (ref 3.3–5)
ALP SERPL-CCNC: 66 U/L — SIGNIFICANT CHANGE UP (ref 40–120)
ALT FLD-CCNC: 43 U/L — SIGNIFICANT CHANGE UP (ref 12–78)
ANION GAP SERPL CALC-SCNC: 5 MMOL/L — SIGNIFICANT CHANGE UP (ref 5–17)
AST SERPL-CCNC: 70 U/L — HIGH (ref 15–37)
BILIRUB SERPL-MCNC: 0.7 MG/DL — SIGNIFICANT CHANGE UP (ref 0.2–1.2)
BUN SERPL-MCNC: 15 MG/DL — SIGNIFICANT CHANGE UP (ref 7–23)
CALCIUM SERPL-MCNC: 8.9 MG/DL — SIGNIFICANT CHANGE UP (ref 8.5–10.1)
CHLORIDE SERPL-SCNC: 103 MMOL/L — SIGNIFICANT CHANGE UP (ref 96–108)
CO2 SERPL-SCNC: 29 MMOL/L — SIGNIFICANT CHANGE UP (ref 22–31)
CREAT SERPL-MCNC: 0.73 MG/DL — SIGNIFICANT CHANGE UP (ref 0.5–1.3)
GLUCOSE BLDC GLUCOMTR-MCNC: 127 MG/DL — HIGH (ref 70–99)
GLUCOSE BLDC GLUCOMTR-MCNC: 161 MG/DL — HIGH (ref 70–99)
GLUCOSE BLDC GLUCOMTR-MCNC: 180 MG/DL — HIGH (ref 70–99)
GLUCOSE BLDC GLUCOMTR-MCNC: 307 MG/DL — HIGH (ref 70–99)
GLUCOSE SERPL-MCNC: 86 MG/DL — SIGNIFICANT CHANGE UP (ref 70–99)
HCT VFR BLD CALC: 32 % — LOW (ref 39–50)
HGB BLD-MCNC: 11.1 G/DL — LOW (ref 13–17)
HIV 1+2 AB+HIV1 P24 AG SERPL QL IA: SIGNIFICANT CHANGE UP
MCHC RBC-ENTMCNC: 23 PG — LOW (ref 27–34)
MCHC RBC-ENTMCNC: 34.7 GM/DL — SIGNIFICANT CHANGE UP (ref 32–36)
MCV RBC AUTO: 66.4 FL — LOW (ref 80–100)
NRBC # BLD: 3 /100 WBCS — HIGH (ref 0–0)
PLATELET # BLD AUTO: 325 K/UL — SIGNIFICANT CHANGE UP (ref 150–400)
POTASSIUM SERPL-MCNC: 3.7 MMOL/L — SIGNIFICANT CHANGE UP (ref 3.5–5.3)
POTASSIUM SERPL-SCNC: 3.7 MMOL/L — SIGNIFICANT CHANGE UP (ref 3.5–5.3)
PROT SERPL-MCNC: 7.2 GM/DL — SIGNIFICANT CHANGE UP (ref 6–8.3)
RBC # BLD: 4.82 M/UL — SIGNIFICANT CHANGE UP (ref 4.2–5.8)
RBC # FLD: 17.8 % — HIGH (ref 10.3–14.5)
SODIUM SERPL-SCNC: 137 MMOL/L — SIGNIFICANT CHANGE UP (ref 135–145)
WBC # BLD: 5.42 K/UL — SIGNIFICANT CHANGE UP (ref 3.8–10.5)
WBC # FLD AUTO: 5.42 K/UL — SIGNIFICANT CHANGE UP (ref 3.8–10.5)

## 2020-07-12 PROCEDURE — 99233 SBSQ HOSP IP/OBS HIGH 50: CPT

## 2020-07-12 RX ORDER — SODIUM CHLORIDE 9 MG/ML
3 INJECTION INTRAMUSCULAR; INTRAVENOUS; SUBCUTANEOUS DAILY
Refills: 0 | Status: DISCONTINUED | OUTPATIENT
Start: 2020-07-12 | End: 2020-07-12

## 2020-07-12 NOTE — PROGRESS NOTE ADULT - ASSESSMENT
60 yr old with Latent TB from [facility] with wt loss, constant thirst, poor appetite, weakness and increased urination .    R/O Pulm TB:  - H/O latent TB on meds since march  - As per daughter no H/o COVID-19 , Neg Ab  - CT with rt upper lobe infiltrate  - Now on rifampin, and INH, B6, will switch to treatment regimen if ID agrees  - Check sputum for AFB X 3, neg X 3 with sputum indiction   - Positive QuantiFeron   - ID following pulm consulted    Syncope:  - Likely vasovagal   - In the setting of dehydration uncontrolled DM, viral illness  - PE ruled out  - CT head neg  - 2D echo with preserved EF, no acute path    Uncontrolled DM with hyperglycemia:  - DKA ruled out  - Hb A1c 10.6  - Increased pre meal Humalog and Lantus dose  - Endo following    Pseudohyponatremia:  - Corrected Na ok      Severe protein-calorie malnutrition:  - Nutritional supplements.       MERCEDES:  - Resolved, stop IVF  - B/L non obstructing stone, outpt follow up     DVT ppx    Discussed with daughter on phone.

## 2020-07-12 NOTE — PROGRESS NOTE ADULT - SUBJECTIVE AND OBJECTIVE BOX
Patient is a 60y old  Male who presents with a chief complaint of Mr Cuevas a a 60 y old man with hx quantiferon gold now with weight loss   has a hx recently diagnosed diabetes (12 Jul 2020 13:14)      Interval History: finger sticks are in 100's now   decreased glucose toxicity   on Lantus 24 units and prandial lispro 8 units     MEDICATIONS  (STANDING):  dextrose 5%. 1000 milliLiter(s) (50 mL/Hr) IV Continuous <Continuous>  dextrose 50% Injectable 12.5 Gram(s) IV Push once  dextrose 50% Injectable 25 Gram(s) IV Push once  dextrose 50% Injectable 25 Gram(s) IV Push once  enoxaparin Injectable 40 milliGRAM(s) SubCutaneous daily  insulin glargine Injectable (LANTUS) 24 Unit(s) SubCutaneous at bedtime  insulin lispro (HumaLOG) corrective regimen sliding scale   SubCutaneous three times a day before meals  insulin lispro Injectable (HumaLOG) 8 Unit(s) SubCutaneous three times a day before meals  isoniazid 300 milliGRAM(s) Oral daily  pyridoxine 50 milliGRAM(s) Oral daily  rifAMPin 600 milliGRAM(s) Oral daily    MEDICATIONS  (PRN):  dextrose 40% Gel 15 Gram(s) Oral once PRN Blood Glucose LESS THAN 70 milliGRAM(s)/deciliter  glucagon  Injectable 1 milliGRAM(s) IntraMuscular once PRN Glucose LESS THAN 70 milligrams/deciliter      Allergies    No Known Allergies    Intolerances        REVIEW OF SYSTEMS:  CONSTITUTIONAL: no changes    Vital Signs Last 24 Hrs  T(C): 36.9 (12 Jul 2020 16:37), Max: 36.9 (12 Jul 2020 00:15)  T(F): 98.5 (12 Jul 2020 16:37), Max: 98.5 (12 Jul 2020 05:28)  HR: 86 (12 Jul 2020 16:37) (82 - 91)  BP: 109/56 (12 Jul 2020 16:37) (103/29 - 122/66)  BP(mean): --  RR: 18 (12 Jul 2020 16:37) (18 - 18)  SpO2: 95% (12 Jul 2020 16:37) (95% - 99%)    PHYSICAL EXAM:  GENERAL: deferred , isolation     LABS:        CAPILLARY BLOOD GLUCOSE      POCT Blood Glucose.: 180 mg/dL (12 Jul 2020 21:27)  POCT Blood Glucose.: 161 mg/dL (12 Jul 2020 16:46)  POCT Blood Glucose.: 307 mg/dL (12 Jul 2020 11:01)  POCT Blood Glucose.: 127 mg/dL (12 Jul 2020 08:07)    Lipid panel:           Thyroid:  Diabetes Tests:  Parathyroid Panel:  Adrenals:  RADIOLOGY & ADDITIONAL TESTS:    Imaging Personally Reviewed:  [ ] YES  [ ] NO    Consultant(s) Notes Reviewed:  [ ] YES  [ ] NO    Care Discussed with Consultants/Other Providers [ ] YES  [ ] NO

## 2020-07-12 NOTE — PROGRESS NOTE ADULT - SUBJECTIVE AND OBJECTIVE BOX
Patient is a 60y old  Male who presents with a chief complaint of near syncope (07 Jul 2020 21:13)      INTERVAL HPI/OVERNIGHT EVENTS:  Pt was seen and examined, no acute events.    MEDICATIONS  (STANDING):  dextrose 5%. 1000 milliLiter(s) (50 mL/Hr) IV Continuous <Continuous>  dextrose 50% Injectable 12.5 Gram(s) IV Push once  dextrose 50% Injectable 25 Gram(s) IV Push once  dextrose 50% Injectable 25 Gram(s) IV Push once  enoxaparin Injectable 40 milliGRAM(s) SubCutaneous daily  insulin glargine Injectable (LANTUS) 24 Unit(s) SubCutaneous at bedtime  insulin lispro (HumaLOG) corrective regimen sliding scale   SubCutaneous three times a day before meals  insulin lispro Injectable (HumaLOG) 8 Unit(s) SubCutaneous three times a day before meals  isoniazid 300 milliGRAM(s) Oral daily  pyridoxine 50 milliGRAM(s) Oral daily  rifAMPin 600 milliGRAM(s) Oral daily  sodium chloride 3%  Inhalation 4 milliLiter(s) Inhalation daily    MEDICATIONS  (PRN):  dextrose 40% Gel 15 Gram(s) Oral once PRN Blood Glucose LESS THAN 70 milliGRAM(s)/deciliter  glucagon  Injectable 1 milliGRAM(s) IntraMuscular once PRN Glucose LESS THAN 70 milligrams/deciliter      Allergies  No Known Allergies      Vital Signs Last 24 Hrs  T(C): 36.9 (12 Jul 2020 05:28), Max: 37.2 (11 Jul 2020 16:25)  T(F): 98.5 (12 Jul 2020 05:28), Max: 98.9 (11 Jul 2020 16:25)  HR: 91 (12 Jul 2020 05:28) (82 - 109)  BP: 103/29 (12 Jul 2020 05:28) (103/29 - 113/63)  BP(mean): --  RR: 18 (12 Jul 2020 05:28) (16 - 18)  SpO2: 97% (12 Jul 2020 05:28) (97% - 99%)      PHYSICAL EXAM:  GENERAL: NAD  HEAD:  Atraumatic  EYES: PERRLA  NERVOUS SYSTEM:  Awake, alert  CHEST/LUNG: Clear  HEART: RRR  ABDOMEN: Soft, non tender  EXTREMITIES:  no edema      LABS:                        11.1   5.42  )-----------( 325      ( 12 Jul 2020 06:26 )             32.0     07-12    137  |  103  |  15  ----------------------------<  86  3.7   |  29  |  0.73    Ca    8.9      12 Jul 2020 06:26    TPro  7.2  /  Alb  3.1<L>  /  TBili  0.7  /  DBili  x   /  AST  70<H>  /  ALT  43  /  AlkPhos  66  07-12        CAPILLARY BLOOD GLUCOSE      POCT Blood Glucose.: 127 mg/dL (12 Jul 2020 08:07)  POCT Blood Glucose.: 264 mg/dL (11 Jul 2020 21:20)  POCT Blood Glucose.: 146 mg/dL (11 Jul 2020 16:39)  POCT Blood Glucose.: 424 mg/dL (11 Jul 2020 11:32)  POCT Blood Glucose.: 450 mg/dL (11 Jul 2020 11:30)      Culture - Acid Fast - Sputum w/Smear (collected 11 Jul 2020 19:05)  Source: .Sputum Sputum    Culture - Acid Fast - Sputum w/Smear (collected 10 Jul 2020 19:26)  Source: .Sputum Sputum    Culture - Acid Fast - Sputum w/Smear (collected 09 Jul 2020 15:03)  Source: .Sputum Sputum conical  Preliminary Report (11 Jul 2020 15:03):    Culture is being performed.      RADIOLOGY & ADDITIONAL TESTS:    Imaging Personally Reviewed:  [ ] YES  [ ] NO    Consultant(s) Notes Reviewed:  [ ] YES  [ ] NO    Care Discussed with Consultants/Other Providers [ ] YES  [ ] NO

## 2020-07-12 NOTE — PROGRESS NOTE ADULT - SUBJECTIVE AND OBJECTIVE BOX
vital signs   T 98.5  P 87  /66  R 18      Physical exam   Gen: AAO x 3 No acute distress  Heent: PERRLA EOMI  Heart: RRR S1S2 no murmur  Lungs: Clear to auscultation  Abd: BS + soft and depressible non tender  Ext: No cyanosis or edema  Neuro: no deficit, neck supple  Skin: No rash   Back: No CVA tenderness     labs and studies   wbc 5.4  hgb 11  plt 325  bun 5   creat 0.7  afb neg x 3  ct chest RUL opacities     ros  + weight loss and cough

## 2020-07-12 NOTE — PROGRESS NOTE ADULT - SUBJECTIVE AND OBJECTIVE BOX
INTERVAL HPI:    60 yr male, reports being  diagnosed of latent TB(not clear how) and started on Rifampin and Isoniazid 3 months ago while in [facility]. Reports had cough and fever in April.  [place], went to [facility] in January in January but could not return due to COVID.  Came to [place] 2 weeks ago,  daughter noticed 20 pound  wt loss, weakness, poor appetite, constantly thirsty, increased urination. They attributed it to the TB medications Took  him to  PMD, who recommended that he go to ED, passed out-( slumped over while sitting in a chair ) and did not respond for couple of minutes.  In ED noted severe hyperglycemia(new diagnosis of DM) but not in DKA. IVF and Insulin given. (08 Jul 2020 16:55).  AS above had cough and fever in April which improved after getting started on INH+ Rifampin.   Presently denies fever, chills,  cough , sputum production or night sweats.    OVERNIGHT EVENTS:  Awake and comfortable    Vital Signs Last 24 Hrs  T(C): 36.9 (12 Jul 2020 16:37), Max: 36.9 (12 Jul 2020 00:15)  T(F): 98.5 (12 Jul 2020 16:37), Max: 98.5 (12 Jul 2020 05:28)  HR: 86 (12 Jul 2020 16:37) (82 - 91)  BP: 109/56 (12 Jul 2020 16:37) (103/29 - 122/66)  BP(mean): --  RR: 18 (12 Jul 2020 16:37) (18 - 18)  SpO2: 95% (12 Jul 2020 16:37) (95% - 99%)    PHYSICAL EXAM:  GEN:         Awake, responsive and comfortable.  HEENT:    Normal.    RESP:        no wheezing.  CVS:          Regular rate and rhythm.   ABD:         Soft, non-tender, non-distended;     MEDICATIONS  (STANDING):  dextrose 5%. 1000 milliLiter(s) (50 mL/Hr) IV Continuous <Continuous>  dextrose 50% Injectable 12.5 Gram(s) IV Push once  dextrose 50% Injectable 25 Gram(s) IV Push once  dextrose 50% Injectable 25 Gram(s) IV Push once  enoxaparin Injectable 40 milliGRAM(s) SubCutaneous daily  insulin glargine Injectable (LANTUS) 24 Unit(s) SubCutaneous at bedtime  insulin lispro (HumaLOG) corrective regimen sliding scale   SubCutaneous three times a day before meals  insulin lispro Injectable (HumaLOG) 8 Unit(s) SubCutaneous three times a day before meals  isoniazid 300 milliGRAM(s) Oral daily  pyridoxine 50 milliGRAM(s) Oral daily  rifAMPin 600 milliGRAM(s) Oral daily    MEDICATIONS  (PRN):  dextrose 40% Gel 15 Gram(s) Oral once PRN Blood Glucose LESS THAN 70 milliGRAM(s)/deciliter  glucagon  Injectable 1 milliGRAM(s) IntraMuscular once PRN Glucose LESS THAN 70 milligrams/deciliter    LABS:                        11.1   5.42  )-----------( 325      ( 12 Jul 2020 06:26 )             32.0     07-12    137  |  103  |  15  ----------------------------<  86  3.7   |  29  |  0.73    Ca    8.9      12 Jul 2020 06:26    TPro  7.2  /  Alb  3.1<L>  /  TBili  0.7  /  DBili  x   /  AST  70<H>  /  ALT  43  /  AlkPhos  66  07-12      ASSESSMENT AND PLAN:  ·	RUL opacities suspicious for TB in a pt from [facility] with +ve QuantiFeron Gold and weight loss.  ·	Newly diagnosed DM  ·	Hyperglycemia.  ·	New DM  ·	Dehydration.    Sputum AFB negative x3.  Repeat sedrate in am.  Will discuss CT with chest Radiology.  Discussed with ID, may need bronchoscopy.

## 2020-07-12 NOTE — PROGRESS NOTE ADULT - ASSESSMENT
weight loss and cough with + QF gold but esr normal???  concerning as afb was done while on tx for latent TB deceasing its yield     plan   case discuss with pulm   will review ct with chest radiologist  hiv test ordered, verbal consent obtained r/o hiv infection  check millie, anca and ace level  will likely need a bronchoscopy

## 2020-07-13 LAB
ALBUMIN SERPL ELPH-MCNC: 3.3 G/DL — SIGNIFICANT CHANGE UP (ref 3.3–5)
ALP SERPL-CCNC: 76 U/L — SIGNIFICANT CHANGE UP (ref 40–120)
ALT FLD-CCNC: 48 U/L — SIGNIFICANT CHANGE UP (ref 12–78)
ANION GAP SERPL CALC-SCNC: 6 MMOL/L — SIGNIFICANT CHANGE UP (ref 5–17)
AST SERPL-CCNC: 72 U/L — HIGH (ref 15–37)
BILIRUB SERPL-MCNC: 0.7 MG/DL — SIGNIFICANT CHANGE UP (ref 0.2–1.2)
BUN SERPL-MCNC: 15 MG/DL — SIGNIFICANT CHANGE UP (ref 7–23)
CALCIUM SERPL-MCNC: 9 MG/DL — SIGNIFICANT CHANGE UP (ref 8.5–10.1)
CHLORIDE SERPL-SCNC: 103 MMOL/L — SIGNIFICANT CHANGE UP (ref 96–108)
CO2 SERPL-SCNC: 28 MMOL/L — SIGNIFICANT CHANGE UP (ref 22–31)
CREAT SERPL-MCNC: 0.76 MG/DL — SIGNIFICANT CHANGE UP (ref 0.5–1.3)
ERYTHROCYTE [SEDIMENTATION RATE] IN BLOOD: 5 MM/HR — SIGNIFICANT CHANGE UP (ref 0–20)
GLUCOSE BLDC GLUCOMTR-MCNC: 134 MG/DL — HIGH (ref 70–99)
GLUCOSE BLDC GLUCOMTR-MCNC: 189 MG/DL — HIGH (ref 70–99)
GLUCOSE BLDC GLUCOMTR-MCNC: 70 MG/DL — SIGNIFICANT CHANGE UP (ref 70–99)
GLUCOSE BLDC GLUCOMTR-MCNC: 98 MG/DL — SIGNIFICANT CHANGE UP (ref 70–99)
GLUCOSE SERPL-MCNC: 89 MG/DL — SIGNIFICANT CHANGE UP (ref 70–99)
HCT VFR BLD CALC: 35.4 % — LOW (ref 39–50)
HGB BLD-MCNC: 12.2 G/DL — LOW (ref 13–17)
MCHC RBC-ENTMCNC: 23.2 PG — LOW (ref 27–34)
MCHC RBC-ENTMCNC: 34.5 GM/DL — SIGNIFICANT CHANGE UP (ref 32–36)
MCV RBC AUTO: 67.4 FL — LOW (ref 80–100)
NRBC # BLD: 3 /100 WBCS — HIGH (ref 0–0)
PLATELET # BLD AUTO: 376 K/UL — SIGNIFICANT CHANGE UP (ref 150–400)
POTASSIUM SERPL-MCNC: 4.1 MMOL/L — SIGNIFICANT CHANGE UP (ref 3.5–5.3)
POTASSIUM SERPL-SCNC: 4.1 MMOL/L — SIGNIFICANT CHANGE UP (ref 3.5–5.3)
PROT SERPL-MCNC: 7.9 GM/DL — SIGNIFICANT CHANGE UP (ref 6–8.3)
RBC # BLD: 5.25 M/UL — SIGNIFICANT CHANGE UP (ref 4.2–5.8)
RBC # FLD: 18.2 % — HIGH (ref 10.3–14.5)
SODIUM SERPL-SCNC: 137 MMOL/L — SIGNIFICANT CHANGE UP (ref 135–145)
WBC # BLD: 5.51 K/UL — SIGNIFICANT CHANGE UP (ref 3.8–10.5)
WBC # FLD AUTO: 5.51 K/UL — SIGNIFICANT CHANGE UP (ref 3.8–10.5)

## 2020-07-13 PROCEDURE — 99233 SBSQ HOSP IP/OBS HIGH 50: CPT

## 2020-07-13 NOTE — PROGRESS NOTE ADULT - SUBJECTIVE AND OBJECTIVE BOX
INTERVAL HPI:  60 yr male, reports being  diagnosed of latent TB(not clear how) and started on Rifampin and Isoniazid 3 months ago while in [facility]. Reports had cough and fever in April.  [place], went to [facility] in January in January but could not return due to COVID.  Came to [place] 2 weeks ago,  daughter noticed 20 pound  wt loss, weakness, poor appetite, constantly thirsty, increased urination. They attributed it to the TB medications Took  him to  PMD, who recommended that he go to ED, passed out-( slumped over while sitting in a chair ) and did not respond for couple of minutes.  In ED noted severe hyperglycemia(new diagnosis of DM) but not in DKA. IVF and Insulin given. (08 Jul 2020 16:55).  AS above had cough and fever in April which improved after getting started on INH+ Rifampin.   Presently denies fever, chills,  cough , sputum production or night sweats.    OVERNIGHT EVENTS:  Comfortable     Vital Signs Last 24 Hrs  T(C): 36.7 (13 Jul 2020 16:51), Max: 36.9 (13 Jul 2020 05:56)  T(F): 98 (13 Jul 2020 16:51), Max: 98.5 (13 Jul 2020 05:56)  HR: 90 (13 Jul 2020 16:51) (77 - 99)  BP: 121/55 (13 Jul 2020 16:51) (101/40 - 121/55)  BP(mean): 82 (13 Jul 2020 16:51) (82 - 82)  RR: 18 (13 Jul 2020 16:51) (17 - 18)  SpO2: 97% (13 Jul 2020 16:51) (96% - 100%)    PHYSICAL EXAM:  GEN:         Awake, responsive and comfortable.  HEENT:    Normal.    RESP:        no distress  CVS:             Regular rate and rhythm.   ABD:         Soft, non-tender, non-distended;     MEDICATIONS  (STANDING):  dextrose 5%. 1000 milliLiter(s) (50 mL/Hr) IV Continuous <Continuous>  dextrose 50% Injectable 12.5 Gram(s) IV Push once  dextrose 50% Injectable 25 Gram(s) IV Push once  dextrose 50% Injectable 25 Gram(s) IV Push once  enoxaparin Injectable 40 milliGRAM(s) SubCutaneous daily  insulin glargine Injectable (LANTUS) 24 Unit(s) SubCutaneous at bedtime  insulin lispro (HumaLOG) corrective regimen sliding scale   SubCutaneous three times a day before meals  insulin lispro Injectable (HumaLOG) 8 Unit(s) SubCutaneous three times a day before meals  pyridoxine 50 milliGRAM(s) Oral daily    MEDICATIONS  (PRN):  dextrose 40% Gel 15 Gram(s) Oral once PRN Blood Glucose LESS THAN 70 milliGRAM(s)/deciliter  glucagon  Injectable 1 milliGRAM(s) IntraMuscular once PRN Glucose LESS THAN 70 milligrams/deciliter    LABS:                        12.2   5.51  )-----------( 376      ( 13 Jul 2020 08:10 )             35.4     07-13    137  |  103  |  15  ----------------------------<  89  4.1   |  28  |  0.76    Ca    9.0      13 Jul 2020 08:10    TPro  7.9  /  Alb  3.3  /  TBili  0.7  /  DBili  x   /  AST  72<H>  /  ALT  48  /  AlkPhos  76  07-13    ASSESSMENT AND PLAN:  ·	RUL opacities suspicious for TB in a pt from [facility] with +ve QuantiFeron Gold and weight loss.  ·	Newly diagnosed DM  ·	Hyperglycemia.  ·	New DM  ·	Dehydration.    CT discussed with chest Radiology, suspicious for TB.  Repeat sedrate is again only 5.  Thoracic syrgery planning bronchoscopy for AFB

## 2020-07-13 NOTE — PROGRESS NOTE ADULT - ASSESSMENT
weight loss and cough with + QF gold but esr normal???  concerning as afb was done while on tx for latent TB deceasing its yield     plan   case discuss with pulm   who will review ct with chest radiologist  hiv test neg   check millie, anca and ace level  will likely need a bronchoscopy   concerning as afb were done while on latent tb treatment  will hold tb meds consider bronchoscopy off tb meds to increase yield   will discuss with pulm once ct reviewed

## 2020-07-13 NOTE — PROGRESS NOTE ADULT - SUBJECTIVE AND OBJECTIVE BOX
Patient is a 60y old  Male who presents with a chief complaint of dyspnea, syncope (13 Jul 2020 10:52)      Interval History: finger sticks are stable   on Lantus 24 units and prandial lispro 8 units     MEDICATIONS  (STANDING):  dextrose 5%. 1000 milliLiter(s) (50 mL/Hr) IV Continuous <Continuous>  dextrose 50% Injectable 12.5 Gram(s) IV Push once  dextrose 50% Injectable 25 Gram(s) IV Push once  dextrose 50% Injectable 25 Gram(s) IV Push once  enoxaparin Injectable 40 milliGRAM(s) SubCutaneous daily  insulin glargine Injectable (LANTUS) 24 Unit(s) SubCutaneous at bedtime  insulin lispro (HumaLOG) corrective regimen sliding scale   SubCutaneous three times a day before meals  insulin lispro Injectable (HumaLOG) 8 Unit(s) SubCutaneous three times a day before meals  pyridoxine 50 milliGRAM(s) Oral daily    MEDICATIONS  (PRN):  dextrose 40% Gel 15 Gram(s) Oral once PRN Blood Glucose LESS THAN 70 milliGRAM(s)/deciliter  glucagon  Injectable 1 milliGRAM(s) IntraMuscular once PRN Glucose LESS THAN 70 milligrams/deciliter      Allergies    No Known Allergies    Intolerances        REVIEW OF SYSTEMS:  CONSTITUTIONAL: no changes    Vital Signs Last 24 Hrs  T(C): 36.3 (14 Jul 2020 05:19), Max: 36.8 (13 Jul 2020 11:09)  T(F): 97.3 (14 Jul 2020 05:19), Max: 98.3 (13 Jul 2020 11:09)  HR: 83 (14 Jul 2020 05:19) (73 - 90)  BP: 105/62 (14 Jul 2020 05:19) (105/62 - 121/55)  BP(mean): 82 (13 Jul 2020 16:51) (82 - 82)  RR: 18 (14 Jul 2020 05:19) (17 - 18)  SpO2: 98% (14 Jul 2020 05:19) (97% - 98%)    PHYSICAL EXAM:  GENERAL: no changes   HEAD: Atraumatic, Normocephalic    LABS:    PT/INR - ( 14 Jul 2020 07:33 )   PT: 11.6 sec;   INR: 1.05 ratio             CAPILLARY BLOOD GLUCOSE      POCT Blood Glucose.: 109 mg/dL (14 Jul 2020 08:01)  POCT Blood Glucose.: 189 mg/dL (13 Jul 2020 22:31)  POCT Blood Glucose.: 70 mg/dL (13 Jul 2020 16:58)  POCT Blood Glucose.: 134 mg/dL (13 Jul 2020 11:42)    Lipid panel:           Thyroid:  Diabetes Tests:  Parathyroid Panel:  Adrenals:  RADIOLOGY & ADDITIONAL TESTS:    Imaging Personally Reviewed:  [ ] YES  [ ] NO    Consultant(s) Notes Reviewed:  [ ] YES  [ ] NO    Care Discussed with Consultants/Other Providers [ ] YES  [ ] NO

## 2020-07-13 NOTE — PROGRESS NOTE ADULT - SUBJECTIVE AND OBJECTIVE BOX
pt was diagnosed in Feb in [facility]   with latent TB   cxr neg as per pt   his only sx were cough and decrease appetite x 1 week   now losing weight     Physical exam   Gen: AAO x 3 No acute distress  Heent: PERRLA EOMI  Heart: RRR S1S2 no murmur  Lungs: Clear to auscultation  Abd: BS + soft and depressible non tender  Ext: No cyanosis or edema  Neuro: no deficit, neck supple  Skin: No rash   Back: No CVA tenderness    labs and studies:     wbc 5  esr5  afb neg

## 2020-07-13 NOTE — PROGRESS NOTE ADULT - SUBJECTIVE AND OBJECTIVE BOX
Asked to see patient for bronchoscopy.  Briefly, 61 yo cachectic male from [facility] with recent return to [facility] undergoing 3 drug treatment for "latent" TB admitted after near-syncopal event.  Recent unintentional 20-30# weight loss.  Quantiferon gold + but AFBx3 neg.  CT consistent with likely TB with cavitary lesions noted.  HIV pending.    PMH:  TB    PSH: none    Meds: see list    All:  NKDA    Soc: no smoking    Fam:  NC    PE:  deferred at this time due to respiratory isolation

## 2020-07-13 NOTE — PROGRESS NOTE ADULT - SUBJECTIVE AND OBJECTIVE BOX
Patient is a 60y old  Male who presents with a chief complaint of dyspnea, syncope (13 Jul 2020 10:52)      INTERVAL HPI/OVERNIGHT EVENTS:  Pt was seen and examined, no acute events.    MEDICATIONS  (STANDING):  dextrose 5%. 1000 milliLiter(s) (50 mL/Hr) IV Continuous <Continuous>  dextrose 50% Injectable 12.5 Gram(s) IV Push once  dextrose 50% Injectable 25 Gram(s) IV Push once  dextrose 50% Injectable 25 Gram(s) IV Push once  enoxaparin Injectable 40 milliGRAM(s) SubCutaneous daily  insulin glargine Injectable (LANTUS) 24 Unit(s) SubCutaneous at bedtime  insulin lispro (HumaLOG) corrective regimen sliding scale   SubCutaneous three times a day before meals  insulin lispro Injectable (HumaLOG) 8 Unit(s) SubCutaneous three times a day before meals  pyridoxine 50 milliGRAM(s) Oral daily    MEDICATIONS  (PRN):  dextrose 40% Gel 15 Gram(s) Oral once PRN Blood Glucose LESS THAN 70 milliGRAM(s)/deciliter  glucagon  Injectable 1 milliGRAM(s) IntraMuscular once PRN Glucose LESS THAN 70 milligrams/deciliter      Allergies  No Known Allergies      Vital Signs Last 24 Hrs  T(C): 36.8 (13 Jul 2020 11:09), Max: 36.9 (12 Jul 2020 16:37)  T(F): 98.3 (13 Jul 2020 11:09), Max: 98.5 (12 Jul 2020 16:37)  HR: 77 (13 Jul 2020 11:09) (77 - 99)  BP: 108/53 (13 Jul 2020 11:09) (101/40 - 109/79)  BP(mean): --  RR: 17 (13 Jul 2020 11:09) (17 - 18)  SpO2: 98% (13 Jul 2020 11:09) (95% - 100%)      PHYSICAL EXAM:  GENERAL: NAD  HEAD:  Atraumatic   EYES: PERRLA  NERVOUS SYSTEM:  Awake, alert  CHEST/LUNG: Clear  HEART: RRR  ABDOMEN: Soft, non tender  EXTREMITIES:  no edema      LABS:                        12.2   5.51  )-----------( 376      ( 13 Jul 2020 08:10 )             35.4     07-13    137  |  103  |  15  ----------------------------<  89  4.1   |  28  |  0.76    Ca    9.0      13 Jul 2020 08:10    TPro  7.9  /  Alb  3.3  /  TBili  0.7  /  DBili  x   /  AST  72<H>  /  ALT  48  /  AlkPhos  76  07-13      CAPILLARY BLOOD GLUCOSE      POCT Blood Glucose.: 134 mg/dL (13 Jul 2020 11:42)  POCT Blood Glucose.: 98 mg/dL (13 Jul 2020 08:09)  POCT Blood Glucose.: 180 mg/dL (12 Jul 2020 21:27)  POCT Blood Glucose.: 161 mg/dL (12 Jul 2020 16:46)      Culture - Acid Fast - Sputum w/Smear (collected 11 Jul 2020 19:05)  Source: .Sputum Sputum    Culture - Acid Fast - Sputum w/Smear (collected 10 Jul 2020 19:26)  Source: .Sputum Sputum    Culture - Acid Fast - Sputum w/Smear (collected 09 Jul 2020 15:03)  Source: .Sputum Sputum conical  Preliminary Report (11 Jul 2020 15:03):    Culture is being performed.      RADIOLOGY & ADDITIONAL TESTS:    Imaging Personally Reviewed:  [ ] YES  [ ] NO    Consultant(s) Notes Reviewed:  [ ] YES  [ ] NO    Care Discussed with Consultants/Other Providers [ ] YES  [ ] NO

## 2020-07-13 NOTE — PROGRESS NOTE ADULT - ASSESSMENT
60 yr old with Latent TB from [facility] with wt loss, constant thirst, poor appetite, weakness and increased urination .    R/O Pulm TB:  - H/O latent TB on meds since march  - As per daughter no H/o COVID-19, Neg Ab  - CT with rt upper lobe infiltrate  - Now on rifampin, and INH, B6, will switch to treatment regimen if ID agrees  - Check sputum for AFB X 3, neg X 3 with sputum indiction   - Positive QuantiFeron   - Neg HIV  - ID and pulm following, cardio thoracic consulted, plan for bronch    Syncope:  - Likely vasovagal   - In the setting of dehydration uncontrolled DM, viral illness  - PE ruled out  - CT head neg  - 2D echo with preserved EF, no acute path    Uncontrolled DM with hyperglycemia:  - DKA ruled out  - Hb A1c 10.6  - Increased pre meal Humalog and Lantus dose  - Endo following    Pseudohyponatremia:  - Corrected Na ok      Severe protein-calorie malnutrition:  - Nutritional supplements.       MERCEDES:  - Resolved, stop IVF  - B/L non obstructing stone, outpt follow up     DVT ppx    Called daughter and left msg, awaiting call back.

## 2020-07-14 LAB
ACE SERPL-CCNC: 59 U/L — SIGNIFICANT CHANGE UP (ref 14–82)
ALBUMIN SERPL ELPH-MCNC: 3.5 G/DL — SIGNIFICANT CHANGE UP (ref 3.3–5)
ALP SERPL-CCNC: 76 U/L — SIGNIFICANT CHANGE UP (ref 40–120)
ALT FLD-CCNC: 44 U/L — SIGNIFICANT CHANGE UP (ref 12–78)
ANA TITR SER: NEGATIVE — SIGNIFICANT CHANGE UP
ANION GAP SERPL CALC-SCNC: 4 MMOL/L — LOW (ref 5–17)
AST SERPL-CCNC: 59 U/L — HIGH (ref 15–37)
BILIRUB SERPL-MCNC: 0.7 MG/DL — SIGNIFICANT CHANGE UP (ref 0.2–1.2)
BUN SERPL-MCNC: 15 MG/DL — SIGNIFICANT CHANGE UP (ref 7–23)
CALCIUM SERPL-MCNC: 8.9 MG/DL — SIGNIFICANT CHANGE UP (ref 8.5–10.1)
CHLORIDE SERPL-SCNC: 103 MMOL/L — SIGNIFICANT CHANGE UP (ref 96–108)
CO2 SERPL-SCNC: 31 MMOL/L — SIGNIFICANT CHANGE UP (ref 22–31)
CREAT SERPL-MCNC: 0.7 MG/DL — SIGNIFICANT CHANGE UP (ref 0.5–1.3)
GLUCOSE BLDC GLUCOMTR-MCNC: 109 MG/DL — HIGH (ref 70–99)
GLUCOSE BLDC GLUCOMTR-MCNC: 169 MG/DL — HIGH (ref 70–99)
GLUCOSE BLDC GLUCOMTR-MCNC: 206 MG/DL — HIGH (ref 70–99)
GLUCOSE BLDC GLUCOMTR-MCNC: 241 MG/DL — HIGH (ref 70–99)
GLUCOSE SERPL-MCNC: 99 MG/DL — SIGNIFICANT CHANGE UP (ref 70–99)
HCT VFR BLD CALC: 35.1 % — LOW (ref 39–50)
HGB BLD-MCNC: 11.9 G/DL — LOW (ref 13–17)
INR BLD: 1.05 RATIO — SIGNIFICANT CHANGE UP (ref 0.88–1.16)
MCHC RBC-ENTMCNC: 23 PG — LOW (ref 27–34)
MCHC RBC-ENTMCNC: 33.9 GM/DL — SIGNIFICANT CHANGE UP (ref 32–36)
MCV RBC AUTO: 67.8 FL — LOW (ref 80–100)
NRBC # BLD: 2 /100 WBCS — HIGH (ref 0–0)
PLATELET # BLD AUTO: 282 K/UL — SIGNIFICANT CHANGE UP (ref 150–400)
POTASSIUM SERPL-MCNC: 4.1 MMOL/L — SIGNIFICANT CHANGE UP (ref 3.5–5.3)
POTASSIUM SERPL-SCNC: 4.1 MMOL/L — SIGNIFICANT CHANGE UP (ref 3.5–5.3)
PROT SERPL-MCNC: 7.9 GM/DL — SIGNIFICANT CHANGE UP (ref 6–8.3)
PROTHROM AB SERPL-ACNC: 11.6 SEC — SIGNIFICANT CHANGE UP (ref 10.6–13.6)
RBC # BLD: 5.18 M/UL — SIGNIFICANT CHANGE UP (ref 4.2–5.8)
RBC # FLD: 18.6 % — HIGH (ref 10.3–14.5)
SODIUM SERPL-SCNC: 138 MMOL/L — SIGNIFICANT CHANGE UP (ref 135–145)
WBC # BLD: 7.38 K/UL — SIGNIFICANT CHANGE UP (ref 3.8–10.5)
WBC # FLD AUTO: 7.38 K/UL — SIGNIFICANT CHANGE UP (ref 3.8–10.5)

## 2020-07-14 PROCEDURE — 99233 SBSQ HOSP IP/OBS HIGH 50: CPT

## 2020-07-14 NOTE — PROGRESS NOTE ADULT - SUBJECTIVE AND OBJECTIVE BOX
pt seen with abn CT chest c/w Tb  status post afb neg ( while on RFP and INH)  esr normal  weight loss QF gold +    vital signs reviewed  T 97.3  P 83  R 18  /62    Physical exam     Gen: AAO x 3 No acute distress  Heent: PERRLA EOMI  Heart: RRR S1S2 no murmur  Lungs: Clear to auscultation  Abd: BS + soft and depressible non tender  Ext: No cyanosis or edema  Neuro: no deficit, neck supple  Skin: No rash   Back: No CVA tenderness     labs and studies:    wbc 7.38    Na 138  bun 15   creat .7

## 2020-07-14 NOTE — PROGRESS NOTE ADULT - SUBJECTIVE AND OBJECTIVE BOX
Patient is a 60y old  Male who presents with a chief complaint of dyspnea, syncope (13 Jul 2020 10:52)      Interval History: on Lantus 24 units and prandial lispro 8 units  on anti- TB medications   finger sticks are in low 200's   suspect some Insulin Resistance      MEDICATIONS  (STANDING):  dextrose 5%. 1000 milliLiter(s) (50 mL/Hr) IV Continuous <Continuous>  dextrose 50% Injectable 12.5 Gram(s) IV Push once  dextrose 50% Injectable 25 Gram(s) IV Push once  dextrose 50% Injectable 25 Gram(s) IV Push once  enoxaparin Injectable 40 milliGRAM(s) SubCutaneous daily  insulin glargine Injectable (LANTUS) 24 Unit(s) SubCutaneous at bedtime  insulin lispro (HumaLOG) corrective regimen sliding scale   SubCutaneous three times a day before meals  insulin lispro Injectable (HumaLOG) 8 Unit(s) SubCutaneous three times a day before meals  pyridoxine 50 milliGRAM(s) Oral daily    MEDICATIONS  (PRN):  dextrose 40% Gel 15 Gram(s) Oral once PRN Blood Glucose LESS THAN 70 milliGRAM(s)/deciliter  glucagon  Injectable 1 milliGRAM(s) IntraMuscular once PRN Glucose LESS THAN 70 milligrams/deciliter      Allergies    No Known Allergies    Intolerances        REVIEW OF SYSTEMS: deferred     Vital Signs Last 24 Hrs  T(C): 36.8 (14 Jul 2020 17:24), Max: 37.1 (14 Jul 2020 16:42)  T(F): 98.3 (14 Jul 2020 17:24), Max: 98.8 (14 Jul 2020 16:42)  HR: 93 (14 Jul 2020 17:24) (73 - 93)  BP: 127/57 (14 Jul 2020 17:24) (105/62 - 127/57)  BP(mean): --  RR: 18 (14 Jul 2020 17:24) (18 - 18)  SpO2: 98% (14 Jul 2020 17:24) (98% - 98%)    PHYSICAL EXAM:  GENERAL: as per the progress notes of Primary Team     LABS:    PT/INR - ( 14 Jul 2020 07:33 )   PT: 11.6 sec;   INR: 1.05 ratio             CAPILLARY BLOOD GLUCOSE      POCT Blood Glucose.: 206 mg/dL (14 Jul 2020 16:10)  POCT Blood Glucose.: 241 mg/dL (14 Jul 2020 11:51)  POCT Blood Glucose.: 109 mg/dL (14 Jul 2020 08:01)    Lipid panel:           Thyroid:  Diabetes Tests:  Parathyroid Panel:  Adrenals:  RADIOLOGY & ADDITIONAL TESTS:    Imaging Personally Reviewed:  [ ] YES  [ ] NO    Consultant(s) Notes Reviewed:  [ ] YES  [ ] NO    Care Discussed with Consultants/Other Providers [ ] YES  [ ] NO

## 2020-07-14 NOTE — PROGRESS NOTE ADULT - SUBJECTIVE AND OBJECTIVE BOX
Patient is a 60y old  Male who presents with a chief complaint of dyspnea, syncope (13 Jul 2020 10:52)    INTERVAL HPI/OVERNIGHT EVENTS:  Pt was seen and examined, no acute events.    MEDICATIONS  (STANDING):  dextrose 5%. 1000 milliLiter(s) (50 mL/Hr) IV Continuous <Continuous>  dextrose 50% Injectable 12.5 Gram(s) IV Push once  dextrose 50% Injectable 25 Gram(s) IV Push once  dextrose 50% Injectable 25 Gram(s) IV Push once  enoxaparin Injectable 40 milliGRAM(s) SubCutaneous daily  insulin glargine Injectable (LANTUS) 24 Unit(s) SubCutaneous at bedtime  insulin lispro (HumaLOG) corrective regimen sliding scale   SubCutaneous three times a day before meals  insulin lispro Injectable (HumaLOG) 8 Unit(s) SubCutaneous three times a day before meals  pyridoxine 50 milliGRAM(s) Oral daily    MEDICATIONS  (PRN):  dextrose 40% Gel 15 Gram(s) Oral once PRN Blood Glucose LESS THAN 70 milliGRAM(s)/deciliter  glucagon  Injectable 1 milliGRAM(s) IntraMuscular once PRN Glucose LESS THAN 70 milligrams/deciliter      Allergies  No Known Allergies      Vital Signs Last 24 Hrs  T(C): 36.8 (14 Jul 2020 17:24), Max: 37.1 (14 Jul 2020 16:42)  T(F): 98.3 (14 Jul 2020 17:24), Max: 98.8 (14 Jul 2020 16:42)  HR: 93 (14 Jul 2020 17:24) (73 - 93)  BP: 127/57 (14 Jul 2020 17:24) (105/62 - 127/57)  BP(mean): --  RR: 18 (14 Jul 2020 17:24) (18 - 18)  SpO2: 98% (14 Jul 2020 17:24) (98% - 98%)      PHYSICAL EXAM:  GENERAL: NAD  HEAD:  Atraumatic   EYES: PERRLA  NERVOUS SYSTEM:  Awake, alert  CHEST/LUNG: Clear  HEART: RRR  ABDOMEN: Soft, non tender  EXTREMITIES:  no edema      LABS:                        11.9   7.38  )-----------( 282      ( 14 Jul 2020 07:33 )             35.1     07-14    138  |  103  |  15  ----------------------------<  99  4.1   |  31  |  0.70    Ca    8.9      14 Jul 2020 07:33    TPro  7.9  /  Alb  3.5  /  TBili  0.7  /  DBili  x   /  AST  59<H>  /  ALT  44  /  AlkPhos  76  07-14    PT/INR - ( 14 Jul 2020 07:33 )   PT: 11.6 sec;   INR: 1.05 ratio         CAPILLARY BLOOD GLUCOSE      POCT Blood Glucose.: 206 mg/dL (14 Jul 2020 16:10)  POCT Blood Glucose.: 241 mg/dL (14 Jul 2020 11:51)  POCT Blood Glucose.: 109 mg/dL (14 Jul 2020 08:01)  POCT Blood Glucose.: 189 mg/dL (13 Jul 2020 22:31)      Culture - Acid Fast - Sputum w/Smear (collected 11 Jul 2020 19:05)  Source: .Sputum Sputum    Culture - Acid Fast - Sputum w/Smear (collected 10 Jul 2020 19:26)  Source: .Sputum Sputum      RADIOLOGY & ADDITIONAL TESTS:    Imaging Personally Reviewed:  [ ] YES  [ ] NO    Consultant(s) Notes Reviewed:  [ ] YES  [ ] NO    Care Discussed with Consultants/Other Providers [ ] YES  [ ] NO

## 2020-07-14 NOTE — PROGRESS NOTE ADULT - ASSESSMENT
r/o TB  inh and rfp dc'd   discussed with pulm   for bronchoscopy   rec cytology as well  pls send bal for afb, bacterial culture, cytology and MTB PCR   discuss with pt via    all questions and concerns were addressed to their satisfaction

## 2020-07-14 NOTE — PROGRESS NOTE ADULT - SUBJECTIVE AND OBJECTIVE BOX
INTERVAL HPI:  60 yr male, reports being  diagnosed of latent TB(not clear how) and started on Rifampin and Isoniazid 3 months ago while in [facility]. Reports had cough and fever in April.  [place], went to [facility] in January in January but could not return due to COVID.  Came to [place] 2 weeks ago,  daughter noticed 20 pound  wt loss, weakness, poor appetite, constantly thirsty, increased urination. They attributed it to the TB medications Took  him to  PMD, who recommended that he go to ED, passed out-( slumped over while sitting in a chair ) and did not respond for couple of minutes.  In ED noted severe hyperglycemia(new diagnosis of DM) but not in DKA. IVF and Insulin given. (08 Jul 2020 16:55).  AS above had cough and fever in April which improved after getting started on INH+ Rifampin.   Presently denies fever, chills,  cough , sputum production or night sweats.    OVERNIGHT EVENTS:  No clinical change.    Vital Signs Last 24 Hrs  T(C): 36.8 (14 Jul 2020 17:24), Max: 37.1 (14 Jul 2020 16:42)  T(F): 98.3 (14 Jul 2020 17:24), Max: 98.8 (14 Jul 2020 16:42)  HR: 93 (14 Jul 2020 17:24) (73 - 93)  BP: 127/57 (14 Jul 2020 17:24) (105/62 - 127/57)  BP(mean): --  RR: 18 (14 Jul 2020 17:24) (18 - 18)  SpO2: 98% (14 Jul 2020 17:24) (98% - 98%)    PHYSICAL EXAM:  GEN:         Awake, responsive and comfortable.  HEENT:    Normal.    RESP:       no distress  CVS:         Regular rate and rhythm.   ABD:         Soft, non-tender, non-distended;     MEDICATIONS  (STANDING):  dextrose 5%. 1000 milliLiter(s) (50 mL/Hr) IV Continuous <Continuous>  dextrose 50% Injectable 12.5 Gram(s) IV Push once  dextrose 50% Injectable 25 Gram(s) IV Push once  dextrose 50% Injectable 25 Gram(s) IV Push once  enoxaparin Injectable 40 milliGRAM(s) SubCutaneous daily  insulin glargine Injectable (LANTUS) 24 Unit(s) SubCutaneous at bedtime  insulin lispro (HumaLOG) corrective regimen sliding scale   SubCutaneous three times a day before meals  insulin lispro Injectable (HumaLOG) 8 Unit(s) SubCutaneous three times a day before meals  pyridoxine 50 milliGRAM(s) Oral daily    MEDICATIONS  (PRN):  dextrose 40% Gel 15 Gram(s) Oral once PRN Blood Glucose LESS THAN 70 milliGRAM(s)/deciliter  glucagon  Injectable 1 milliGRAM(s) IntraMuscular once PRN Glucose LESS THAN 70 milligrams/deciliter    LABS:                        11.9   7.38  )-----------( 282      ( 14 Jul 2020 07:33 )             35.1     07-14    138  |  103  |  15  ----------------------------<  99  4.1   |  31  |  0.70    Ca    8.9      14 Jul 2020 07:33    TPro  7.9  /  Alb  3.5  /  TBili  0.7  /  DBili  x   /  AST  59<H>  /  ALT  44  /  AlkPhos  76  07-14    PT/INR - ( 14 Jul 2020 07:33 )   PT: 11.6 sec;   INR: 1.05 ratio       ASSESSMENT AND PLAN:  ·	RUL opacities suspicious for TB in a pt from [facility] with +ve QuantiFeron Gold and weight loss.  ·	Newly diagnosed DM  ·	Hyperglycemia.  ·	New DM  ·	Dehydration.    Discussed earlier with ID( Dr. Ureña), INH and Rifampin dced prior to Bronchoscopy with concern of Resistance.  DR. Aguiar informed me that Bronchoscopy cancelled by OR due to malfunctioning of equipment.

## 2020-07-14 NOTE — PROGRESS NOTE ADULT - ASSESSMENT
60 yr old with Latent TB from [facility] with wt loss, constant thirst, poor appetite, weakness and increased urination .    R/O Pulm TB:  - H/O latent TB on meds since march  - As per daughter no H/o COVID-19, Neg Ab  - CT with rt upper lobe infiltrate  - Now on rifampin, and INH regime, held now per ID prior bronch  - Sputum for AFB neg X 3 with sputum indiction   - Positive QuantiFeron, Neg HIV  - ID and pulm following, cardio thoracic consulted, plan for bronch, postponed for equipment malfunction, Will follow up.    Syncope:  - Likely vasovagal   - In the setting of dehydration uncontrolled DM, viral illness  - PE ruled out  - CT head neg  - 2D echo with preserved EF, no acute path    Uncontrolled DM with hyperglycemia:  - DKA ruled out  - Hb A1c 10.6  - Increased pre meal Humalog and Lantus dose  - Endo following    Pseudohyponatremia:  - Corrected Na ok      Severe protein-calorie malnutrition:  - Nutritional supplements.       MERCEDES:  - Resolved, stop IVF  - B/L non obstructing stone, outpt follow up     DVT ppx    Discussed with daughter on phone yesterday  Discussed with Latosha Daniel and Cosme.

## 2020-07-15 LAB
AUTO DIFF PNL BLD: NEGATIVE — SIGNIFICANT CHANGE UP
C-ANCA SER-ACNC: NEGATIVE — SIGNIFICANT CHANGE UP
GLUCOSE BLDC GLUCOMTR-MCNC: 100 MG/DL — HIGH (ref 70–99)
GLUCOSE BLDC GLUCOMTR-MCNC: 160 MG/DL — HIGH (ref 70–99)
GLUCOSE BLDC GLUCOMTR-MCNC: 193 MG/DL — HIGH (ref 70–99)
GLUCOSE BLDC GLUCOMTR-MCNC: 327 MG/DL — HIGH (ref 70–99)
GLUCOSE BLDC GLUCOMTR-MCNC: 85 MG/DL — SIGNIFICANT CHANGE UP (ref 70–99)
P-ANCA SER-ACNC: NEGATIVE — SIGNIFICANT CHANGE UP

## 2020-07-15 PROCEDURE — 99232 SBSQ HOSP IP/OBS MODERATE 35: CPT

## 2020-07-15 RX ORDER — INSULIN GLARGINE 100 [IU]/ML
12 INJECTION, SOLUTION SUBCUTANEOUS ONCE
Refills: 0 | Status: COMPLETED | OUTPATIENT
Start: 2020-07-15 | End: 2020-07-15

## 2020-07-15 RX ADMIN — INSULIN GLARGINE 12 UNIT(S): 100 INJECTION, SOLUTION SUBCUTANEOUS at 23:44

## 2020-07-15 NOTE — PROGRESS NOTE ADULT - SUBJECTIVE AND OBJECTIVE BOX
pt was diagnosed in Feb in [facility]   with latent TB   cxr neg as per pt   his only sx were cough and decrease appetite x 1 week   now losing weight     pt seen with abn CT chest c/w Tb  status post afb neg ( while on RFP and INH)  esr normal  weight loss QF gold +    he is scheduled for bronchoscopy     Review of systems  denies fevers chills nausea vomiting or diarrhea    vital signs reviewed   t 98 bp 122/67 p 77 r 18    Physical exam   Gen: AAO x 3 No acute distress  Heent: PERRLA EOMI  Heart: RRR S1S2 no murmur  Lungs: Clear to auscultation  Abd: BS + soft and depressible non tender  Ext: No cyanosis or edema  Neuro: no deficit, neck supple  Skin: No rash   Back: No CVA tenderness     labs and studies:   wbc 7.38 hgb 11.9 plt 282  na 138 cl 103  bun 15 creat 0.7

## 2020-07-15 NOTE — PROGRESS NOTE ADULT - ASSESSMENT
60 yr old with Latent TB from [facility] with wt loss, constant thirst, poor appetite, weakness and increased urination .    R/O Pulm TB:  - H/O latent TB on meds since march  - As per daughter no H/o COVID-19, Neg Ab  - CT with rt upper lobe infiltrate  - Now on rifampin, and INH regime, held now per ID prior bronch  - Sputum for AFB neg X 3 with sputum indiction   - Positive QuantiFeron, Neg HIV  - ID and pulm following, cardio thoracic consulted, plan for bronch, postponed for equipment malfunction. Needs to reschedule.   -Cont isolation precautions     Syncope:  - Likely vasovagal   - In the setting of dehydration uncontrolled DM, viral illness  - PE ruled out  - CT head neg  - 2D echo with preserved EF, no acute path    Uncontrolled DM with hyperglycemia:  - DKA ruled out  - Hb A1c 10.6  - Cont pre meal Humalog and Lantus dose  - Endo following    Pseudohyponatremia:  - Corrected Na ok      Severe protein-calorie malnutrition:  - Nutritional supplements.       MERCEDES:  - Resolved  - B/L non obstructing stone, outpt follow up     DVT ppx

## 2020-07-15 NOTE — PROGRESS NOTE ADULT - SUBJECTIVE AND OBJECTIVE BOX
Patient is a 60y old  Male who presents with a chief complaint of syncope (15 Jul 2020 13:18)      Interval History: finger sticks are increased but it is a post prandial value   on Lantus 24 units and prandial lispro 8 units    mostly finger sticks are in 100's     MEDICATIONS  (STANDING):  dextrose 5%. 1000 milliLiter(s) (50 mL/Hr) IV Continuous <Continuous>  dextrose 50% Injectable 12.5 Gram(s) IV Push once  dextrose 50% Injectable 25 Gram(s) IV Push once  dextrose 50% Injectable 25 Gram(s) IV Push once  enoxaparin Injectable 40 milliGRAM(s) SubCutaneous daily  insulin glargine Injectable (LANTUS) 24 Unit(s) SubCutaneous at bedtime  insulin lispro (HumaLOG) corrective regimen sliding scale   SubCutaneous three times a day before meals  insulin lispro Injectable (HumaLOG) 8 Unit(s) SubCutaneous three times a day before meals  pyridoxine 50 milliGRAM(s) Oral daily    MEDICATIONS  (PRN):  dextrose 40% Gel 15 Gram(s) Oral once PRN Blood Glucose LESS THAN 70 milliGRAM(s)/deciliter  glucagon  Injectable 1 milliGRAM(s) IntraMuscular once PRN Glucose LESS THAN 70 milligrams/deciliter      Allergies    No Known Allergies    Intolerances        REVIEW OF SYSTEMS:  CONSTITUTIONAL: no changes  EYES: No eye pain, visual disturbances, or discharge  ENMT:  No difficulty hearing, No sinus or throat pain  NECK: No pain or stiffness  RESPIRATORY: No cough, wheezing, chills or hemoptysis; No shortness of breath  CARDIOVASCULAR: No chest pain, palpitations or leg swelling  GASTROINTESTINAL: No abdominal or epigastric pain. No nausea, vomiting, or hematemesis; No diarrhea or constipation. No melena or hematochezia.  GENITOURINARY: No dysuria, frequency, hematuria, or incontinence  NEUROLOGICAL: No headaches, memory loss, loss of strength, numbness, or tremors  SKIN: No itching, burning, rashes, or lesions   ENDOCRINE: No heat or cold intolerance; No hair loss  MUSCULOSKELETAL: No joint pain or swelling; No muscle, back, or extremity pain  PSYCHIATRIC: No depression, anxiety, mood swings, or difficulty sleeping  HEME/LYMPH: No easy bruising, or bleeding gums  ALLERY AND IMMUNOLOGIC: No hives or eczema    Vital Signs Last 24 Hrs  T(C): 36.9 (15 Jul 2020 11:49), Max: 37.2 (14 Jul 2020 23:35)  T(F): 98.4 (15 Jul 2020 11:49), Max: 99 (14 Jul 2020 23:35)  HR: 78 (15 Jul 2020 11:49) (77 - 93)  BP: 132/77 (15 Jul 2020 11:49) (103/52 - 132/77)  BP(mean): --  RR: 17 (15 Jul 2020 11:49) (17 - 18)  SpO2: 98% (15 Jul 2020 11:49) (98% - 98%)    PHYSICAL EXAM:  GENERAL:   HEAD: Atraumatic, Normocephalic  EYES: PERRLA, conjunctiva and sclera clear  ENMT: No tonsillar erythema, exudates, or enlargement; Moist mucous membranes, Good dentition, No lesions  NECK: Supple, No JVD, Normal thyroid  NERVOUS SYSTEM:  Alert & Oriented X3, Good concentration; Motor Strength 5/5 B/L upper and lower extremities  CHEST/LUNG: Clear to auscultaion bilaterally; No rales, rhonchi, wheezing, or rubs  HEART: Regular rate and rhythm; No murmurs, rubs, or gallops  ABDOMEN: Soft, Nontender, Nondistended; Bowel sounds present  EXTREMITIES:  2+ Peripheral Pulses, no edema  SKIN: No rashes or lesions    LABS:    PT/INR - ( 14 Jul 2020 07:33 )   PT: 11.6 sec;   INR: 1.05 ratio             CAPILLARY BLOOD GLUCOSE      POCT Blood Glucose.: 327 mg/dL (15 Jul 2020 11:33)  POCT Blood Glucose.: 100 mg/dL (15 Jul 2020 08:05)  POCT Blood Glucose.: 169 mg/dL (14 Jul 2020 21:41)    Lipid panel:           Thyroid:  Diabetes Tests:  Parathyroid Panel:  Adrenals:  RADIOLOGY & ADDITIONAL TESTS:    Imaging Personally Reviewed:  [ ] YES  [ ] NO    Consultant(s) Notes Reviewed:  [ ] YES  [ ] NO    Care Discussed with Consultants/Other Providers [ ] YES  [ ] NO

## 2020-07-15 NOTE — PROGRESS NOTE ADULT - SUBJECTIVE AND OBJECTIVE BOX
INTERVAL HPI:  60 yr male, reports being  diagnosed of latent TB(not clear how) and started on Rifampin and Isoniazid 3 months ago while in [facility]. Reports had cough and fever in April.  [place] in [place], went to [facility] in January in January but could not return due to COVID.  Came to [place] 2 weeks ago,  daughter noticed 20 pound  wt loss, weakness, poor appetite, constantly thirsty, increased urination. They attributed it to the TB medications Took  him to  PMD, who recommended that he go to ED, passed out-( slumped over while sitting in a chair ) and did not respond for couple of minutes.  In ED noted severe hyperglycemia(new diagnosis of DM) but not in DKA. IVF and Insulin given. (08 Jul 2020 16:55).  AS above had cough and fever in April which improved after getting started on INH+ Rifampin.   Presently denies fever, chills,  cough , sputum production or night sweats.    OVERNIGHT EVENTS:  Comfortable in chair    Vital Signs Last 24 Hrs  T(C): 36.9 (15 Jul 2020 16:54), Max: 37.2 (14 Jul 2020 23:35)  T(F): 98.4 (15 Jul 2020 16:54), Max: 99 (14 Jul 2020 23:35)  HR: 78 (15 Jul 2020 16:54) (77 - 93)  BP: 121/60 (15 Jul 2020 16:54) (103/52 - 132/77)  BP(mean): --  RR: 17 (15 Jul 2020 16:54) (17 - 18)  SpO2: 98% (15 Jul 2020 16:54) (98% - 98%)    PHYSICAL EXAM:  GEN:         Awake, responsive and comfortable.  HEENT:    Normal.    RESP:       no wheezing.  CVS:          Regular rate and rhythm.   ABD:         Soft, non-tender, non-distended;     MEDICATIONS  (STANDING):  dextrose 5%. 1000 milliLiter(s) (50 mL/Hr) IV Continuous <Continuous>  dextrose 50% Injectable 12.5 Gram(s) IV Push once  dextrose 50% Injectable 25 Gram(s) IV Push once  dextrose 50% Injectable 25 Gram(s) IV Push once  enoxaparin Injectable 40 milliGRAM(s) SubCutaneous daily  insulin glargine Injectable (LANTUS) 24 Unit(s) SubCutaneous at bedtime  insulin lispro (HumaLOG) corrective regimen sliding scale   SubCutaneous three times a day before meals  insulin lispro Injectable (HumaLOG) 8 Unit(s) SubCutaneous three times a day before meals  pyridoxine 50 milliGRAM(s) Oral daily    MEDICATIONS  (PRN):  dextrose 40% Gel 15 Gram(s) Oral once PRN Blood Glucose LESS THAN 70 milliGRAM(s)/deciliter  glucagon  Injectable 1 milliGRAM(s) IntraMuscular once PRN Glucose LESS THAN 70 milligrams/deciliter    LABS:                        11.9   7.38  )-----------( 282      ( 14 Jul 2020 07:33 )             35.1     07-14    138  |  103  |  15  ----------------------------<  99  4.1   |  31  |  0.70    Ca    8.9      14 Jul 2020 07:33    TPro  7.9  /  Alb  3.5  /  TBili  0.7  /  DBili  x   /  AST  59<H>  /  ALT  44  /  AlkPhos  76  07-14    PT/INR - ( 14 Jul 2020 07:33 )   PT: 11.6 sec;   INR: 1.05 ratio        ASSESSMENT AND PLAN:  ·	RUL opacities suspicious for TB in a pt from [facility] with +ve QuantiFeron Gold and weight loss.  ·	Newly diagnosed DM  ·	Hyperglycemia.  ·	New DM  ·	Dehydration.    Awaiting for bronchoscopy.  INH and Rifampin held to increase yeaild

## 2020-07-15 NOTE — PROGRESS NOTE ADULT - SUBJECTIVE AND OBJECTIVE BOX
CHIEF COMPLAINT: Occasional cough  no fever  no diarrhea  no chest pain  no shortness of breath at rest.       PHYSICAL EXAM:    GENERAL: Thinly built.   CHEST/LUNG: Clear to ausculation bilaterally, no wheezing, no crackles   HEART: Regular rate and rhythm; No murmurs, rubs  ABDOMEN: Soft, Nontende	r, Nondistended; Bowel sounds present  EXTREMITIES:  Moving all four extremities spontaneously, No clubbing, cyanosis, or edema  NERVOUS SYSTEM:  Grossly non focal.  Psychiatry: AAO x 3, mood is appropriate       OBJECTIVE DATA:   Vital Signs Last 24 Hrs  T(C): 36.9 (15 Jul 2020 11:49), Max: 37.2 (2020 23:35)  T(F): 98.4 (15 Jul 2020 11:49), Max: 99 (2020 23:35)  HR: 78 (15 Jul 2020 11:49) (77 - 93)  BP: 132/77 (15 Jul 2020 11:49) (103/52 - 132/77)  BP(mean): --  RR: 17 (15 Jul 2020 11:49) (17 - 18)  SpO2: 98% (15 Jul 2020 11:49) (98% - 98%)           Daily     Daily Weight in k.2 (15 Jul 2020 05:21)  LABS:                        11.9   7.38  )-----------( 282      ( 2020 07:33 )             35.1             07-14    138  |  103  |  15  ----------------------------<  99  4.1   |  31  |  0.70    Ca    8.9      2020 07:33    TPro  7.9  /  Alb  3.5  /  TBili  0.7  /  DBili  x   /  AST  59<H>  /  ALT  44  /  AlkPhos  76  07-14              PT/INR - ( 2020 07:33 )   PT: 11.6 sec;   INR: 1.05 ratio                   CAPILLARY BLOOD GLUCOSE      POCT Blood Glucose.: 327 mg/dL (15 Jul 2020 11:33)         Interval Radiology studies: reviewed by me    MEDICATIONS  (STANDING):  dextrose 5%. 1000 milliLiter(s) (50 mL/Hr) IV Continuous <Continuous>  dextrose 50% Injectable 12.5 Gram(s) IV Push once  dextrose 50% Injectable 25 Gram(s) IV Push once  dextrose 50% Injectable 25 Gram(s) IV Push once  enoxaparin Injectable 40 milliGRAM(s) SubCutaneous daily  insulin glargine Injectable (LANTUS) 24 Unit(s) SubCutaneous at bedtime  insulin lispro (HumaLOG) corrective regimen sliding scale   SubCutaneous three times a day before meals  insulin lispro Injectable (HumaLOG) 8 Unit(s) SubCutaneous three times a day before meals  pyridoxine 50 milliGRAM(s) Oral daily    MEDICATIONS  (PRN):  dextrose 40% Gel 15 Gram(s) Oral once PRN Blood Glucose LESS THAN 70 milliGRAM(s)/deciliter  glucagon  Injectable 1 milliGRAM(s) IntraMuscular once PRN Glucose LESS THAN 70 milligrams/deciliter

## 2020-07-15 NOTE — PROGRESS NOTE ADULT - ASSESSMENT
weight loss and cough with + QF gold but esr normal???  concerning as afb was done while on tx for latent TB deceasing its yield     plan   hiv test neg   neg  millie, anca   for bronchoscopy   concerning as afb were done while on latent tb treatment  held tb meds to increase yield   discuss with pt via   all questions and concerns were addressed to their satisfaction   check weight q week

## 2020-07-16 DIAGNOSIS — E11.9 TYPE 2 DIABETES MELLITUS WITHOUT COMPLICATIONS: ICD-10-CM

## 2020-07-16 DIAGNOSIS — R63.4 ABNORMAL WEIGHT LOSS: ICD-10-CM

## 2020-07-16 LAB
GLUCOSE BLDC GLUCOMTR-MCNC: 155 MG/DL — HIGH (ref 70–99)
GLUCOSE BLDC GLUCOMTR-MCNC: 169 MG/DL — HIGH (ref 70–99)
GLUCOSE BLDC GLUCOMTR-MCNC: 196 MG/DL — HIGH (ref 70–99)
GLUCOSE BLDC GLUCOMTR-MCNC: 200 MG/DL — HIGH (ref 70–99)
GLUCOSE BLDC GLUCOMTR-MCNC: 282 MG/DL — HIGH (ref 70–99)

## 2020-07-16 PROCEDURE — 99232 SBSQ HOSP IP/OBS MODERATE 35: CPT

## 2020-07-16 NOTE — PROGRESS NOTE ADULT - ASSESSMENT
weight loss and cough with + QF gold but esr normal???  concerning as afb was done while on tx for latent TB deceasing its yield     hiv test neg   neg  millie, anca   for bronchoscopy   concerning as afb were done while on latent tb treatment  held tb meds to increase yield   check weight q week weight loss and cough with + QF gold but esr normal???  concerning as afb was done while on tx for latent TB deceasing its yield     hiv test neg   neg  millie, anca   for bronchoscopy   concerning as afb were done while on latent tb treatment  held tb meds to increase yield   clinically my beaseline   not toxic appearing  will be discussed with JOHANNE re discharge plan

## 2020-07-16 NOTE — PROGRESS NOTE ADULT - SUBJECTIVE AND OBJECTIVE BOX
CHIEF COMPLAINT: No overnight events.   no fever  no diarrhea  no chest pain  no shortness of breath at rest.       PHYSICAL EXAM:    GENERAL: Thinly built.   CHEST/LUNG: Clear to ausculation bilaterally, no wheezing, no crackles   HEART: Regular rate and rhythm; No murmurs, rubs  ABDOMEN: Soft, Nontender, Nondistended; Bowel sounds present  EXTREMITIES:  Moving all four extremities spontaneously, No clubbing, cyanosis, or edema  NERVOUS SYSTEM:  Grossly non focal.  Psychiatry: AAO x 3, mood is appropriate       OBJECTIVE DATA:     Vital Signs Last 24 Hrs  T(C): 36.9 (16 Jul 2020 11:38), Max: 36.9 (15 Jul 2020 16:54)  T(F): 98.4 (16 Jul 2020 11:38), Max: 98.4 (15 Jul 2020 16:54)  HR: 79 (16 Jul 2020 11:38) (78 - 90)  BP: 113/65 (16 Jul 2020 11:38) (113/60 - 123/66)  BP(mean): --  RR: 17 (16 Jul 2020 11:38) (17 - 18)  SpO2: 97% (16 Jul 2020 11:38) (96% - 98%)           Daily     	          CAPILLARY BLOOD GLUCOSE      POCT Blood Glucose.: 196 mg/dL (16 Jul 2020 11:14)          Interval Radiology studies: reviewed   MEDICATIONS  (STANDING):  dextrose 5%. 1000 milliLiter(s) (50 mL/Hr) IV Continuous <Continuous>  dextrose 50% Injectable 12.5 Gram(s) IV Push once  dextrose 50% Injectable 25 Gram(s) IV Push once  dextrose 50% Injectable 25 Gram(s) IV Push once  enoxaparin Injectable 40 milliGRAM(s) SubCutaneous daily  insulin glargine Injectable (LANTUS) 24 Unit(s) SubCutaneous at bedtime  insulin lispro (HumaLOG) corrective regimen sliding scale   SubCutaneous three times a day before meals  insulin lispro Injectable (HumaLOG) 8 Unit(s) SubCutaneous three times a day before meals  pyridoxine 50 milliGRAM(s) Oral daily    MEDICATIONS  (PRN):  dextrose 40% Gel 15 Gram(s) Oral once PRN Blood Glucose LESS THAN 70 milliGRAM(s)/deciliter  glucagon  Injectable 1 milliGRAM(s) IntraMuscular once PRN Glucose LESS THAN 70 milligrams/deciliter

## 2020-07-16 NOTE — PROGRESS NOTE ADULT - SUBJECTIVE AND OBJECTIVE BOX
INTERVAL HPI:  60 yr male, reports being  diagnosed of latent TB(not clear how) and started on Rifampin and Isoniazid 3 months ago while in [facility]. Reports had cough and fever in April.  [place], went to [facility] in January in January but could not return due to COVID.  Came to [place] 2 weeks ago,  daughter noticed 20 pound  wt loss, weakness, poor appetite, constantly thirsty, increased urination. They attributed it to the TB medications Took  him to  PMD, who recommended that he go to ED, passed out-( slumped over while sitting in a chair ) and did not respond for couple of minutes.  In ED noted severe hyperglycemia(new diagnosis of DM) but not in DKA. IVF and Insulin given. (08 Jul 2020 16:55).  AS above had cough and fever in April which improved after getting started on INH+ Rifampin.   Presently denies fever, chills,  cough , sputum production or night sweats.    OVERNIGHT EVENTS:  No change    Vital Signs Last 24 Hrs  T(C): 36.9 (16 Jul 2020 11:38), Max: 36.9 (15 Jul 2020 16:54)  T(F): 98.4 (16 Jul 2020 11:38), Max: 98.4 (15 Jul 2020 16:54)  HR: 79 (16 Jul 2020 11:38) (78 - 90)  BP: 113/65 (16 Jul 2020 11:38) (113/60 - 123/66)  BP(mean): --  RR: 17 (16 Jul 2020 11:38) (17 - 18)  SpO2: 97% (16 Jul 2020 11:38) (96% - 98%)    PHYSICAL EXAM:  GEN:         Awake, responsive and comfortable.  HEENT:    Normal.    RESP:      no distress  CVS:          Regular rate and rhythm.   ABD:         Soft, non-tender, non-distended;     MEDICATIONS  (STANDING):  dextrose 5%. 1000 milliLiter(s) (50 mL/Hr) IV Continuous <Continuous>  dextrose 50% Injectable 12.5 Gram(s) IV Push once  dextrose 50% Injectable 25 Gram(s) IV Push once  dextrose 50% Injectable 25 Gram(s) IV Push once  enoxaparin Injectable 40 milliGRAM(s) SubCutaneous daily  insulin glargine Injectable (LANTUS) 24 Unit(s) SubCutaneous at bedtime  insulin lispro (HumaLOG) corrective regimen sliding scale   SubCutaneous three times a day before meals  insulin lispro Injectable (HumaLOG) 8 Unit(s) SubCutaneous three times a day before meals  pyridoxine 50 milliGRAM(s) Oral daily    MEDICATIONS  (PRN):  dextrose 40% Gel 15 Gram(s) Oral once PRN Blood Glucose LESS THAN 70 milliGRAM(s)/deciliter  glucagon  Injectable 1 milliGRAM(s) IntraMuscular once PRN Glucose LESS THAN 70 milligrams/deciliter      ASSESSMENT AND PLAN:  ·	RUL opacities suspicious for TB in a pt from [facility] with +ve QuantiFeron Gold and weight loss.  ·	Newly diagnosed DM  ·	Hyperglycemia.  ·	New DM  ·	Dehydration.    Awaiting equipment availability for bronch.

## 2020-07-16 NOTE — PROGRESS NOTE ADULT - ASSESSMENT
60 yr old with Latent TB from [facility] with wt loss, constant thirst, poor appetite, weakness and increased urination .    R/O Pulm TB:  - H/O latent TB on meds since march  - As per daughter no H/o COVID-19, Neg Ab  - CT with rt upper lobe infiltrate  - Now on rifampin, and INH regime, held now per ID prior bronch  - Sputum for AFB neg X 3 with sputum indiction   - Positive QuantiFeron, Neg HIV  - ID and pulm following, cardio thoracic consulted. Awaiting equipment repair or replacement for bronchoscopy. Administration (Dr. Aranda and Anton) aware.   -Cont isolation precautions     Pancytopenia. no active gross bleeding. Medication induced. ID following. Follow CBC.     Syncope:  - Likely vasovagal   - In the setting of dehydration uncontrolled DM, viral illness  - PE ruled out  - CT head neg  - 2D echo with preserved EF, no acute path    Uncontrolled DM with hyperglycemia:  - DKA ruled out  - Hb A1c 10.6  - Cont pre meal Humalog and Lantus dose  - Endo following    Pseudohyponatremia:  - Corrected Na ok      Severe protein-calorie malnutrition:  - Nutritional supplements.       MERCEDES:  - Resolved  - B/L non obstructing stone, outpt follow up     DVT ppx 60 yr old with Latent TB from [facility] with wt loss, constant thirst, poor appetite, weakness and increased urination .    R/O Pulm TB:  - H/O latent TB on meds since march  - As per daughter no H/o COVID-19, Neg Ab  - CT with rt upper lobe infiltrate  - Now on rifampin, and INH regime, held now per ID prior bronch  - Sputum for AFB neg X 3 with sputum indiction   - Positive QuantiFeron, Neg HIV  - ID and pulm following, cardio thoracic consulted. Awaiting equipment repair or replacement for bronchoscopy. Administration (Dr. Aranda and Anton) aware.   -Cont isolation precautions       Syncope:  - Likely vasovagal   - In the setting of dehydration uncontrolled DM, viral illness  - PE ruled out  - CT head neg  - 2D echo with preserved EF, no acute path    DM type 2. controlled.   - DKA ruled out  - Hb A1c 10.6  - Cont pre meal Humalog and Lantus dose  - Endo following    Pseudohyponatremia:  - Corrected Na ok      Severe protein-calorie malnutrition:  - Nutritional supplements.       MERCEDES:  - Resolved  - B/L non obstructing stone, outpt follow up     DVT ppx

## 2020-07-16 NOTE — PROGRESS NOTE ADULT - SUBJECTIVE AND OBJECTIVE BOX
Asked to perform bronchoscopy and biopsy for possible TB.  Originally scheduled for today however no bronchoscope available at this hospital and J unable to obtain loaner for procedure.  Currently off of TB medications but remains in respiratory isolation. flu-like symptoms

## 2020-07-16 NOTE — CHART NOTE - NSCHARTNOTEFT_GEN_A_CORE
Pt admitted s/p syncope event; c latent TB, recent wt loss, newly dx DM, awaiting bronchoscopy.     Factors impacting intake: [X ] none [ ] nausea  [ ] vomiting [ ] diarrhea [ ] constipation  [ ]chewing problems [ ] swallowing issues  [ ] other:     Diet Prescription: Diet, Consistent Carbohydrate w/Evening Snack:   Supplement Feeding Modality:  Oral  Glucerna Shake Cans or Servings Per Day:  1       Frequency:  Three Times a day (07-09-20 @ 11:35)    Intake:   pt now consuming 100% most meals; drinking supplement    Current Weight:  49.6 kg (7/16); admission wt 51.8 kg (7/9)  % Weight Change: 4.2% wt loss x 1 week    Unable to perform nutrition-focused physical exam due to isolation precautions    Physical Appearance:  no edema noted    Pertinent Medications: MEDICATIONS  (STANDING):  dextrose 5%. 1000 milliLiter(s) (50 mL/Hr) IV Continuous <Continuous>  dextrose 50% Injectable 12.5 Gram(s) IV Push once  dextrose 50% Injectable 25 Gram(s) IV Push once  dextrose 50% Injectable 25 Gram(s) IV Push once  enoxaparin Injectable 40 milliGRAM(s) SubCutaneous daily  insulin glargine Injectable (LANTUS) 24 Unit(s) SubCutaneous at bedtime  insulin lispro (HumaLOG) corrective regimen sliding scale   SubCutaneous three times a day before meals  insulin lispro Injectable (HumaLOG) 8 Unit(s) SubCutaneous three times a day before meals  pyridoxine 50 milliGRAM(s) Oral daily    MEDICATIONS  (PRN):  dextrose 40% Gel 15 Gram(s) Oral once PRN Blood Glucose LESS THAN 70 milliGRAM(s)/deciliter  glucagon  Injectable 1 milliGRAM(s) IntraMuscular once PRN Glucose LESS THAN 70 milligrams/deciliter    Pertinent Labs: 07-14 Na138 mmol/L Glu 99 mg/dL K+ 4.1 mmol/L Cr  0.70 mg/dL BUN 15 mg/dL 07-14 Alb 3.5 g/dL; 07-15 POCT: 100, 327, 193, 85, 160, 07-07 A1c 10.6%,average Glu 258    Skin: WDL    Estimated Needs:   [X ] no change since previous assessment (7/9)  [ ] recalculated:     Previous Nutrition Diagnosis:   [X ] Moderate Malnutrition in context of acute illness  Etiology:  Inadequate energy/protein intake related to uncontrolled DM, TB, social circumstances  Signs & Symptoms:  8.9% wt loss x 3 months; <75% nutrition needs >7 days    Nutrition Diagnosis is [ ] ongoing  [ ] resolved [X ] no longer applicable    New Nutrition Diagnosis:   [X ] Unintended Wt Loss    Related to (etiology):  uncontrolled DM, TB    As evidenced by (signs & symptoms):  4.2% wt loss x 1 week; 12.8% x 3 months    Goal/Expected Outcome:  Pt to consume >75% meals/supplements; maintain wt +/- 2# during hospitalization    Interventions:   continue current diet rx as noted  Recommend  [ ] Change Diet To:  [ ] Nutrition Supplement  [ ] Nutrition Support  [ ] Other:     Monitoring and Evaluation:   [ X] PO intake [ x ] Tolerance to diet prescription [ x ] weights [ x ] labs[ x ] follow up per protocol  [ ] other:

## 2020-07-16 NOTE — PROGRESS NOTE ADULT - SUBJECTIVE AND OBJECTIVE BOX
Patient is a 60y old  Male who presents with a chief complaint of cough (16 Jul 2020 14:57)      Interval History: finger sticks are in 100's  on Lantus 25 units and prandial lispro 8 units     MEDICATIONS  (STANDING):  dextrose 5%. 1000 milliLiter(s) (50 mL/Hr) IV Continuous <Continuous>  dextrose 50% Injectable 12.5 Gram(s) IV Push once  dextrose 50% Injectable 25 Gram(s) IV Push once  dextrose 50% Injectable 25 Gram(s) IV Push once  enoxaparin Injectable 40 milliGRAM(s) SubCutaneous daily  insulin glargine Injectable (LANTUS) 24 Unit(s) SubCutaneous at bedtime  insulin lispro (HumaLOG) corrective regimen sliding scale   SubCutaneous three times a day before meals  insulin lispro Injectable (HumaLOG) 8 Unit(s) SubCutaneous three times a day before meals  pyridoxine 50 milliGRAM(s) Oral daily    MEDICATIONS  (PRN):  dextrose 40% Gel 15 Gram(s) Oral once PRN Blood Glucose LESS THAN 70 milliGRAM(s)/deciliter  glucagon  Injectable 1 milliGRAM(s) IntraMuscular once PRN Glucose LESS THAN 70 milligrams/deciliter      Allergies    No Known Allergies    Intolerances        REVIEW OF SYSTEMS: deferred   CONSTITUTIONAL: no changes      Vital Signs Last 24 Hrs  T(C): 36.1 (16 Jul 2020 23:37), Max: 36.9 (16 Jul 2020 11:38)  T(F): 97 (16 Jul 2020 23:37), Max: 98.4 (16 Jul 2020 11:38)  HR: 81 (16 Jul 2020 23:37) (79 - 90)  BP: 107/61 (16 Jul 2020 23:37) (107/61 - 139/62)  BP(mean): --  RR: 18 (16 Jul 2020 23:37) (16 - 18)  SpO2: 98% (16 Jul 2020 23:37) (96% - 99%)    PHYSICAL EXAM:  GENERAL: in isolation     LABS:        CAPILLARY BLOOD GLUCOSE      POCT Blood Glucose.: 282 mg/dL (16 Jul 2020 21:34)  POCT Blood Glucose.: 200 mg/dL (16 Jul 2020 16:49)  POCT Blood Glucose.: 196 mg/dL (16 Jul 2020 11:14)  POCT Blood Glucose.: 169 mg/dL (16 Jul 2020 08:09)  POCT Blood Glucose.: 155 mg/dL (16 Jul 2020 04:09)    Lipid panel:           Thyroid:  Diabetes Tests:  Parathyroid Panel:  Adrenals:  RADIOLOGY & ADDITIONAL TESTS:    Imaging Personally Reviewed:  [ ] YES  [ ] NO    Consultant(s) Notes Reviewed:  [ ] YES  [ ] NO    Care Discussed with Consultants/Other Providers [ ] YES  [ ] NO

## 2020-07-16 NOTE — PROGRESS NOTE ADULT - SUBJECTIVE AND OBJECTIVE BOX
60 yr old diagnosed of latent TB and started on Rifampin and Isoniazid 3 months ago while in [facility]. He came to [place] 2 weeks ago and the dgtr has noticed 20 pds wt loss, weakness, poor appetite, constantly thirsty, increased urination. They attributed it to the TB medications SE. They did take him to their PMD, who recommended that he go to ED. However patient passed out- slumped over while sitting in a chair and did not respond for couple of minutes. Upon shaking after he came awake and said he was fine. Dgtr brought him to ED with concerns.     In ED noted severe hyperglycemia and not in DKA. IVF and Insulin given. (08 Jul 2020 16:55)      Allergies    No Known Allergies    Intolerances        MEDICATIONS  (STANDING):  dextrose 5%. 1000 milliLiter(s) (50 mL/Hr) IV Continuous <Continuous>  dextrose 50% Injectable 12.5 Gram(s) IV Push once  dextrose 50% Injectable 25 Gram(s) IV Push once  dextrose 50% Injectable 25 Gram(s) IV Push once  enoxaparin Injectable 40 milliGRAM(s) SubCutaneous daily  insulin glargine Injectable (LANTUS) 24 Unit(s) SubCutaneous at bedtime  insulin lispro (HumaLOG) corrective regimen sliding scale   SubCutaneous three times a day before meals  insulin lispro Injectable (HumaLOG) 8 Unit(s) SubCutaneous three times a day before meals  pyridoxine 50 milliGRAM(s) Oral daily    MEDICATIONS  (PRN):  dextrose 40% Gel 15 Gram(s) Oral once PRN Blood Glucose LESS THAN 70 milliGRAM(s)/deciliter  glucagon  Injectable 1 milliGRAM(s) IntraMuscular once PRN Glucose LESS THAN 70 milligrams/deciliter      REVIEW OF SYSTEMS:    CONSTITUTIONAL: No fever, chills, weight loss, or fatigue  HEENT: No sore throat, runny nose, ear ache  RESPIRATORY: No cough, wheezing, No shortness of breath  CARDIOVASCULAR: No chest pain, palpitations, dizziness  GASTROINTESTINAL: No abdominal pain. No nausea, vomiting, diarrhea  GENITOURINARY: No dysuria, increase frequency, hematuria, or incontinence  NEUROLOGICAL: No headaches, memory loss, loss of strength, numbness, or tremors, no weakness  EXTREMITY: No pedal edema BLE  SKIN: No itching, burning, rashes, or lesions     VITAL SIGNS:  T(C): 36.9 (07-16-20 @ 11:38), Max: 36.9 (07-15-20 @ 16:54)  T(F): 98.4 (07-16-20 @ 11:38), Max: 98.4 (07-15-20 @ 16:54)  HR: 79 (07-16-20 @ 11:38) (78 - 90)  BP: 113/65 (07-16-20 @ 11:38) (113/60 - 123/66)  RR: 17 (07-16-20 @ 11:38) (17 - 18)  SpO2: 97% (07-16-20 @ 11:38) (96% - 98%)  Wt(kg): --    PHYSICAL EXAM:    GENERAL: not in any distress  HEENT: Neck is supple, normocephalic, atraumatic   CHEST/LUNG: Clear to auscultation bilaterally; No rales, rhonchi, wheezing  HEART: Regular rate and rhythm; No murmurs, rubs, or gallops  ABDOMEN: Soft, Nontender, Nondistended; Bowel sounds present, no rebound   EXTREMITIES:  2+ Peripheral Pulses, No clubbing, cyanosis, or edema  GENITOURINARY:   SKIN: No rashes or lesions  BACK: no pressor sore   NERVOUS SYSTEM:  Alert & Oriented X3, Good concentration  PSYCH: normal affect     LABS:                           Sedimentation Rate, Erythrocyte: 5 mm/hr (07-13 @ 08:10)            Culture Results:   Culture is being performed. (07-11 @ 19:05)  Culture Results:   Culture is being performed. (07-10 @ 19:26)  Culture Results:   Culture is being performed. (07-09 @ 15:03)                Radiology:    < from: CT Angio Chest w/ IV Cont (07.06.20 @ 14:41) >  IMPRESSION:   Right upper lobe opacities, possibly infectious. Recommend continued follow-up to resolution.  Negative for pulmonary embolism.  Bilateral nonobstructive nephrolithiasis. No hydronephrosis.                GRACE SALVADOR M.D., ATTENDING RADIOLOGIST  This document has been electronically signed. Jul 6 2020  3:08PM                < end of copied text > 60 yr old diagnosed of latent TB and started on Rifampin and Isoniazid 3 months ago while in [facility]. He came to [place] 2 weeks ago and the dgtr has noticed 20 pds wt loss, weakness, poor appetite, constantly thirsty, increased urination. They attributed it to the TB medications SE. They did take him to their PMD, who recommended that he go to ED. However patient passed out- slumped over while sitting in a chair and did not respond for couple of minutes. Upon shaking after he came awake and said he was fine. Dgtr brought him to ED with concerns.   In ED noted severe hyperglycemia and not in DKA. IVF and Insulin given. (08 Jul 2020 16:55)      Allergies    No Known Allergies    Intolerances        MEDICATIONS  (STANDING):  dextrose 5%. 1000 milliLiter(s) (50 mL/Hr) IV Continuous <Continuous>  dextrose 50% Injectable 12.5 Gram(s) IV Push once  dextrose 50% Injectable 25 Gram(s) IV Push once  dextrose 50% Injectable 25 Gram(s) IV Push once  enoxaparin Injectable 40 milliGRAM(s) SubCutaneous daily  insulin glargine Injectable (LANTUS) 24 Unit(s) SubCutaneous at bedtime  insulin lispro (HumaLOG) corrective regimen sliding scale   SubCutaneous three times a day before meals  insulin lispro Injectable (HumaLOG) 8 Unit(s) SubCutaneous three times a day before meals  pyridoxine 50 milliGRAM(s) Oral daily    MEDICATIONS  (PRN):  dextrose 40% Gel 15 Gram(s) Oral once PRN Blood Glucose LESS THAN 70 milliGRAM(s)/deciliter  glucagon  Injectable 1 milliGRAM(s) IntraMuscular once PRN Glucose LESS THAN 70 milligrams/deciliter      REVIEW OF SYSTEMS:    CONSTITUTIONAL: No fever, chills,  has  weight loss, and  fatigue  HEENT: No sore throat, runny nose, ear ache  RESPIRATORY: pos  cough,   no wheezing, No shortness of breath  CARDIOVASCULAR: No chest pain, palpitations, dizziness  GASTROINTESTINAL: No abdominal pain. No nausea, vomiting, diarrhea  GENITOURINARY: No dysuria, increase frequency, hematuria, or incontinence  NEUROLOGICAL: No headaches, memory loss, loss of strength, numbness, or tremors, no weakness  EXTREMITY: No pedal edema BLE  SKIN: No itching, burning, rashes, or lesions     VITAL SIGNS:  T(C): 36.9 (07-16-20 @ 11:38), Max: 36.9 (07-15-20 @ 16:54)  T(F): 98.4 (07-16-20 @ 11:38), Max: 98.4 (07-15-20 @ 16:54)  HR: 79 (07-16-20 @ 11:38) (78 - 90)  BP: 113/65 (07-16-20 @ 11:38) (113/60 - 123/66)  RR: 17 (07-16-20 @ 11:38) (17 - 18)  SpO2: 97% (07-16-20 @ 11:38) (96% - 98%)  Wt(kg): --    PHYSICAL EXAM:    GENERAL: not in any distress  HEENT: Neck is supple, normocephalic, atraumatic   CHEST/LUNG: Clear to auscultation bilaterally; No rales, rhonchi, wheezing  HEART: Regular rate and rhythm; No murmurs, rubs, or gallops  ABDOMEN: Soft, Nontender, Nondistended; Bowel sounds present, no rebound   EXTREMITIES:  2+ Peripheral Pulses, No clubbing, cyanosis, or edema  SKIN: No rashes or lesions  BACK: no pressor sore   NERVOUS SYSTEM:  Alert & Oriented X3, Good concentration  PSYCH: normal affect   cachectic   LABS:                           Sedimentation Rate, Erythrocyte: 5 mm/hr (07-13 @ 08:10)            Culture Results:   Culture is being performed. (07-11 @ 19:05)  Culture Results:   Culture is being performed. (07-10 @ 19:26)  Culture Results:   Culture is being performed. (07-09 @ 15:03)                Radiology:    < from: CT Angio Chest w/ IV Cont (07.06.20 @ 14:41) >  IMPRESSION:   Right upper lobe opacities, possibly infectious. Recommend continued follow-up to resolution.  Negative for pulmonary embolism.  Bilateral nonobstructive nephrolithiasis. No hydronephrosis.                GRAEC SALVADOR M.D., ATTENDING RADIOLOGIST  This document has been electronically signed. Jul 6 2020  3:08PM                < end of copied text >

## 2020-07-17 LAB
GLUCOSE BLDC GLUCOMTR-MCNC: 103 MG/DL — HIGH (ref 70–99)
GLUCOSE BLDC GLUCOMTR-MCNC: 111 MG/DL — HIGH (ref 70–99)
GLUCOSE BLDC GLUCOMTR-MCNC: 114 MG/DL — HIGH (ref 70–99)
GLUCOSE BLDC GLUCOMTR-MCNC: 70 MG/DL — SIGNIFICANT CHANGE UP (ref 70–99)
HCT VFR BLD CALC: 33.2 % — LOW (ref 39–50)
HGB BLD-MCNC: 11.5 G/DL — LOW (ref 13–17)
MCHC RBC-ENTMCNC: 23.5 PG — LOW (ref 27–34)
MCHC RBC-ENTMCNC: 34.6 GM/DL — SIGNIFICANT CHANGE UP (ref 32–36)
MCV RBC AUTO: 67.9 FL — LOW (ref 80–100)
NRBC # BLD: 2 /100 WBCS — HIGH (ref 0–0)
PLATELET # BLD AUTO: 381 K/UL — SIGNIFICANT CHANGE UP (ref 150–400)
RBC # BLD: 4.89 M/UL — SIGNIFICANT CHANGE UP (ref 4.2–5.8)
RBC # FLD: 18.4 % — HIGH (ref 10.3–14.5)
WBC # BLD: 7.55 K/UL — SIGNIFICANT CHANGE UP (ref 3.8–10.5)
WBC # FLD AUTO: 7.55 K/UL — SIGNIFICANT CHANGE UP (ref 3.8–10.5)

## 2020-07-17 PROCEDURE — 99232 SBSQ HOSP IP/OBS MODERATE 35: CPT

## 2020-07-17 RX ORDER — INSULIN GLARGINE 100 [IU]/ML
20 INJECTION, SOLUTION SUBCUTANEOUS AT BEDTIME
Refills: 0 | Status: DISCONTINUED | OUTPATIENT
Start: 2020-07-17 | End: 2020-07-18

## 2020-07-17 RX ADMIN — INSULIN GLARGINE 20 UNIT(S): 100 INJECTION, SOLUTION SUBCUTANEOUS at 21:28

## 2020-07-17 NOTE — PROGRESS NOTE ADULT - ASSESSMENT
60 yr old with Latent TB from [facility] with wt loss, constant thirst, poor appetite, weakness and increased urination .    (I used creole speaking audio interpretor for my communication with the patient since 7/15/20)    R/O Pulm TB:  - H/O latent TB on meds since march  - As per daughter no H/o COVID-19, Neg Ab  - CT with rt upper lobe infiltrate  - Now on rifampin, and INH regime, held now per ID prior bronch  - Sputum for AFB neg X 3 with sputum indiction   - Positive QuantiFeron, Neg HIV  - ID and pulm following, cardio thoracic consulted. Awaiting equipment repair or replacement for bronchoscopy. reviewed consultant notes.   -Cont isolation precautions       Syncope:  - Likely vasovagal   - In the setting of dehydration uncontrolled DM, viral illness  - PE ruled out  - CT head neg  - 2D echo with preserved EF, no acute path    DM type 2. controlled. Blood sugars < 100 but no symptoms of hypoglycemia. follow finger sticks  - DKA ruled out  - Hb A1c 10.6  - Cont pre meal Humalog and Lantus dose  - Endo following    Pseudohyponatremia:  - Corrected Na ok      Severe protein-calorie malnutrition:  - Nutritional supplements.       MERCEDES:  - Resolved  - B/L non obstructing stone, outpt follow up     DVT ppx

## 2020-07-17 NOTE — PROGRESS NOTE ADULT - SUBJECTIVE AND OBJECTIVE BOX
pt seen with infiltrate r/o TB awaiting bronchoscopy     vital signs reviewed     Physical exam     Gen: AAO x 3 No acute distress  Heent: PERRLA EOMI  Heart: RRR S1S2 no murmur  Lungs: Clear to auscultation  Abd: BS + soft and depressible non tender  Ext: No cyanosis or edema  Neuro: no deficit, neck supple  Skin: No rash   Back: No CVA tenderness     labs and studies:    wbc 7.5 hgb 11 plt 381

## 2020-07-17 NOTE — PROGRESS NOTE ADULT - SUBJECTIVE AND OBJECTIVE BOX
CHIEF COMPLAINT: Follow up of TB  no fever  no diarrhea  no chest pain  no shortness of breath at rest.       PHYSICAL EXAM:    GENERAL: Thinly built.   CHEST/LUNG: Clear to ausculation bilaterally, no wheezing, no crackles   HEART: Regular rate and rhythm; No murmurs, rubs  ABDOMEN: Soft, Nontender, Nondistended; Bowel sounds present  EXTREMITIES:  Moving all four extremities spontaneously, No clubbing, cyanosis, or edema  NERVOUS SYSTEM:  Grossly non focal.  Psychiatry: AAO x 3, mood is appropriate       OBJECTIVE DATA:       Vital Signs Last 24 Hrs  T(C): 36.7 (2020 05:33), Max: 36.8 (2020 17:02)  T(F): 98 (2020 05:33), Max: 98.2 (2020 17:02)  HR: 81 (2020 05:33) (81 - 90)  BP: 116/64 (2020 05:33) (107/61 - 139/62)  BP(mean): --  RR: 18 (2020 05:33) (16 - 18)  SpO2: 99% (2020 05:33) (98% - 99%)           Daily     Daily Weight in k.8 (2020 05:33)  LABS:                        11.5   7.55  )-----------( 381      ( 2020 06:28 )             33.2                                      CAPILLARY BLOOD GLUCOSE      POCT Blood Glucose.: 114 mg/dL (2020 11:50)    	  MEDICATIONS  (STANDING):  dextrose 5%. 1000 milliLiter(s) (50 mL/Hr) IV Continuous <Continuous>  dextrose 50% Injectable 12.5 Gram(s) IV Push once  dextrose 50% Injectable 25 Gram(s) IV Push once  dextrose 50% Injectable 25 Gram(s) IV Push once  enoxaparin Injectable 40 milliGRAM(s) SubCutaneous daily  insulin glargine Injectable (LANTUS) 20 Unit(s) SubCutaneous at bedtime  insulin lispro (HumaLOG) corrective regimen sliding scale   SubCutaneous three times a day before meals  insulin lispro Injectable (HumaLOG) 8 Unit(s) SubCutaneous three times a day before meals  pyridoxine 50 milliGRAM(s) Oral daily    MEDICATIONS  (PRN):  dextrose 40% Gel 15 Gram(s) Oral once PRN Blood Glucose LESS THAN 70 milliGRAM(s)/deciliter  glucagon  Injectable 1 milliGRAM(s) IntraMuscular once PRN Glucose LESS THAN 70 milligrams/deciliter

## 2020-07-17 NOTE — PROGRESS NOTE ADULT - SUBJECTIVE AND OBJECTIVE BOX
Patient is a 60y old  Male who presents with a chief complaint of cough (16 Jul 2020 14:57)      INTERVAL HPI/OVERNIGHT EVENTS:  no events overnight  NAD    MEDICATIONS  (STANDING):  dextrose 5%. 1000 milliLiter(s) (50 mL/Hr) IV Continuous <Continuous>  dextrose 50% Injectable 12.5 Gram(s) IV Push once  dextrose 50% Injectable 25 Gram(s) IV Push once  dextrose 50% Injectable 25 Gram(s) IV Push once  enoxaparin Injectable 40 milliGRAM(s) SubCutaneous daily  insulin glargine Injectable (LANTUS) 20 Unit(s) SubCutaneous at bedtime  insulin lispro (HumaLOG) corrective regimen sliding scale   SubCutaneous three times a day before meals  insulin lispro Injectable (HumaLOG) 8 Unit(s) SubCutaneous three times a day before meals  pyridoxine 50 milliGRAM(s) Oral daily    MEDICATIONS  (PRN):  dextrose 40% Gel 15 Gram(s) Oral once PRN Blood Glucose LESS THAN 70 milliGRAM(s)/deciliter  glucagon  Injectable 1 milliGRAM(s) IntraMuscular once PRN Glucose LESS THAN 70 milligrams/deciliter      Vital Signs Last 24 Hrs  T(C): 36.7 (17 Jul 2020 05:33), Max: 36.9 (16 Jul 2020 11:38)  T(F): 98 (17 Jul 2020 05:33), Max: 98.4 (16 Jul 2020 11:38)  HR: 81 (17 Jul 2020 05:33) (79 - 90)  BP: 116/64 (17 Jul 2020 05:33) (107/61 - 139/62)  BP(mean): --  RR: 18 (17 Jul 2020 05:33) (16 - 18)  SpO2: 99% (17 Jul 2020 05:33) (97% - 99%)    PHYSICAL EXAM:  deferred    LABS:                        11.5   7.55  )-----------( 381      ( 17 Jul 2020 06:28 )             33.2               CAPILLARY BLOOD GLUCOSE      POCT Blood Glucose.: 70 mg/dL (17 Jul 2020 07:32)  POCT Blood Glucose.: 282 mg/dL (16 Jul 2020 21:34)  POCT Blood Glucose.: 200 mg/dL (16 Jul 2020 16:49)  POCT Blood Glucose.: 196 mg/dL (16 Jul 2020 11:14)    Lipid panel:               RADIOLOGY & ADDITIONAL TESTS:

## 2020-07-17 NOTE — PROGRESS NOTE ADULT - SUBJECTIVE AND OBJECTIVE BOX
INTERVAL HPI:  60 yr male, reports being  diagnosed of latent TB(not clear how) and started on Rifampin and Isoniazid [facility] Reports had cough and fever in April.  [place]ives in [place], went to [facility] in [facility] in January but could not return due to COVID.  Came to [place] 2 weeks ago,  daughter noticed 20 pound  wt loss, weakness, poor appetite, constantly thirsty, increased urination. They attributed it to the TB medications Took  him to  PMD, who recommended that he go to ED, passed out-( slumped over while sitting in a chair ) and did not respond for couple of minutes.  In ED noted severe hyperglycemia(new diagnosis of DM) but not in DKA. IVF and Insulin given. (08 Jul 2020 16:55).  AS above had cough and fever in April which improved after getting started on INH+ Rifampin.   Presently denies fever, chills,  cough , sputum production or night sweats.    OVERNIGHT EVENTS:  Comfortable, no distress    Vital Signs Last 24 Hrs  T(C): 36.7 (17 Jul 2020 17:00), Max: 36.7 (17 Jul 2020 05:33)  T(F): 98.1 (17 Jul 2020 17:00), Max: 98.1 (17 Jul 2020 17:00)  HR: 77 (17 Jul 2020 17:00) (77 - 81)  BP: 118/58 (17 Jul 2020 17:00) (107/61 - 127/70)  BP(mean): --  RR: 18 (17 Jul 2020 17:00) (17 - 18)  SpO2: 97% (17 Jul 2020 17:00) (97% - 99%)    PHYSICAL EXAM:  GEN:         Awake, responsive and comfortable.  HEENT:    Normal.    RESP:      no wheezing.  CVS:          Regular rate and rhythm.   ABD:         Soft, non-tender, non-distended;     MEDICATIONS  (STANDING):  dextrose 5%. 1000 milliLiter(s) (50 mL/Hr) IV Continuous <Continuous>  dextrose 50% Injectable 12.5 Gram(s) IV Push once  dextrose 50% Injectable 25 Gram(s) IV Push once  dextrose 50% Injectable 25 Gram(s) IV Push once  enoxaparin Injectable 40 milliGRAM(s) SubCutaneous daily  insulin glargine Injectable (LANTUS) 20 Unit(s) SubCutaneous at bedtime  insulin lispro (HumaLOG) corrective regimen sliding scale   SubCutaneous three times a day before meals  insulin lispro Injectable (HumaLOG) 8 Unit(s) SubCutaneous three times a day before meals  pyridoxine 50 milliGRAM(s) Oral daily    MEDICATIONS  (PRN):  dextrose 40% Gel 15 Gram(s) Oral once PRN Blood Glucose LESS THAN 70 milliGRAM(s)/deciliter  glucagon  Injectable 1 milliGRAM(s) IntraMuscular once PRN Glucose LESS THAN 70 milligrams/deciliter    LABS:                        11.5   7.55  )-----------( 381      ( 17 Jul 2020 06:28 )             33.2       ASSESSMENT AND PLAN:  ·	RUL opacities suspicious for TB in a pt from [facility] with +ve QuantiFeron Gold and weight loss.  ·	Newly diagnosed DM  ·	Hyperglycemia.  ·	New DM  ·	Dehydration.    Awaiting for bronchoscopy.

## 2020-07-18 LAB
GLUCOSE BLDC GLUCOMTR-MCNC: 111 MG/DL — HIGH (ref 70–99)
GLUCOSE BLDC GLUCOMTR-MCNC: 141 MG/DL — HIGH (ref 70–99)
GLUCOSE BLDC GLUCOMTR-MCNC: 296 MG/DL — HIGH (ref 70–99)
GLUCOSE BLDC GLUCOMTR-MCNC: 386 MG/DL — HIGH (ref 70–99)
GLUCOSE BLDC GLUCOMTR-MCNC: 58 MG/DL — LOW (ref 70–99)
GLUCOSE BLDC GLUCOMTR-MCNC: 58 MG/DL — LOW (ref 70–99)
GLUCOSE BLDC GLUCOMTR-MCNC: 67 MG/DL — LOW (ref 70–99)
GLUCOSE BLDC GLUCOMTR-MCNC: 68 MG/DL — LOW (ref 70–99)

## 2020-07-18 PROCEDURE — 99232 SBSQ HOSP IP/OBS MODERATE 35: CPT

## 2020-07-18 RX ORDER — DEXTROSE 50 % IN WATER 50 %
15 SYRINGE (ML) INTRAVENOUS ONCE
Refills: 0 | Status: COMPLETED | OUTPATIENT
Start: 2020-07-18 | End: 2020-07-18

## 2020-07-18 RX ORDER — INSULIN GLARGINE 100 [IU]/ML
15 INJECTION, SOLUTION SUBCUTANEOUS AT BEDTIME
Refills: 0 | Status: DISCONTINUED | OUTPATIENT
Start: 2020-07-18 | End: 2020-07-19

## 2020-07-18 RX ORDER — INSULIN LISPRO 100/ML
6 VIAL (ML) SUBCUTANEOUS
Refills: 0 | Status: DISCONTINUED | OUTPATIENT
Start: 2020-07-18 | End: 2020-07-19

## 2020-07-18 RX ORDER — ACETAMINOPHEN 500 MG
650 TABLET ORAL EVERY 6 HOURS
Refills: 0 | Status: DISCONTINUED | OUTPATIENT
Start: 2020-07-18 | End: 2020-07-31

## 2020-07-18 RX ADMIN — Medication 650 MILLIGRAM(S): at 11:44

## 2020-07-18 RX ADMIN — Medication 15 GRAM(S): at 17:04

## 2020-07-18 RX ADMIN — Medication 650 MILLIGRAM(S): at 12:51

## 2020-07-18 RX ADMIN — INSULIN GLARGINE 15 UNIT(S): 100 INJECTION, SOLUTION SUBCUTANEOUS at 21:07

## 2020-07-18 RX ADMIN — Medication 6 UNIT(S): at 11:45

## 2020-07-18 RX ADMIN — Medication 15 GRAM(S): at 08:13

## 2020-07-18 NOTE — PROVIDER CONTACT NOTE (CRITICAL VALUE NOTIFICATION) - BACKGROUND
Patient came to [place] 2 weeks ago and dgtr noticed 20lbs weight loss, weakness, poor appetite and increased urination.

## 2020-07-18 NOTE — PROGRESS NOTE ADULT - SUBJECTIVE AND OBJECTIVE BOX
CHIEF COMPLAINT: Headache,   low blood sugars in am  no fever  no diarrhea  no chest pain  no shortness of breath at rest.       PHYSICAL EXAM:    GENERAL: Thinly built. no Acute distress  CHEST/LUNG: Clear to ausculation bilaterally, no wheezing, no crackles   HEART: Regular rate and rhythm; No murmurs, rubs  ABDOMEN: Soft, Nontender, Nondistended; Bowel sounds present  EXTREMITIES:  Moving all four extremities spontaneously, No clubbing, cyanosis, or edema  NERVOUS SYSTEM:  Grossly non focal.  Psychiatry: AAO x 3, mood is appropriate       OBJECTIVE DATA:       Vital Signs Last 24 Hrs  T(C): 36.6 (18 Jul 2020 05:11), Max: 36.7 (17 Jul 2020 13:20)  T(F): 97.8 (18 Jul 2020 05:11), Max: 98.1 (17 Jul 2020 17:00)  HR: 82 (18 Jul 2020 05:11) (77 - 86)  BP: 129/51 (18 Jul 2020 05:11) (115/58 - 129/51)  BP(mean): --  RR: 18 (18 Jul 2020 05:11) (17 - 18)  SpO2: 98% (18 Jul 2020 05:11) (97% - 98%)           Daily     Daily   LABS:                        11.5   7.55  )-----------( 381      ( 17 Jul 2020 06:28 )             33.2                                      CAPILLARY BLOOD GLUCOSE      POCT Blood Glucose.: 141 mg/dL (18 Jul 2020 08:33)    	  MEDICATIONS  (STANDING):  dextrose 5%. 1000 milliLiter(s) (50 mL/Hr) IV Continuous <Continuous>  dextrose 50% Injectable 12.5 Gram(s) IV Push once  dextrose 50% Injectable 25 Gram(s) IV Push once  dextrose 50% Injectable 25 Gram(s) IV Push once  enoxaparin Injectable 40 milliGRAM(s) SubCutaneous daily  insulin glargine Injectable (LANTUS) 15 Unit(s) SubCutaneous at bedtime  insulin lispro (HumaLOG) corrective regimen sliding scale   SubCutaneous three times a day before meals  insulin lispro Injectable (HumaLOG) 6 Unit(s) SubCutaneous three times a day before meals  pyridoxine 50 milliGRAM(s) Oral daily    MEDICATIONS  (PRN):  acetaminophen   Tablet .. 650 milliGRAM(s) Oral every 6 hours PRN Moderate Pain (4 - 6)  dextrose 40% Gel 15 Gram(s) Oral once PRN Blood Glucose LESS THAN 70 milliGRAM(s)/deciliter  glucagon  Injectable 1 milliGRAM(s) IntraMuscular once PRN Glucose LESS THAN 70 milligrams/deciliter

## 2020-07-18 NOTE — PROGRESS NOTE ADULT - ASSESSMENT
60 yr old with Latent TB from [facility] with wt loss, constant thirst, poor appetite, weakness and increased urination .    (I used creole speaking audio interpretor for my communication with the patient since 7/15/20)    R/O Pulm TB:  - H/O latent TB on meds since march  - As per daughter no H/o COVID-19, Neg Ab  - CT with rt upper lobe infiltrate  - Now on rifampin, and INH regime, held now per ID prior bronch  - Sputum for AFB neg X 3 with sputum indiction   - Positive QuantiFeron, Neg HIV  - ID and pulm following, cardio thoracic consulted. Awaiting equipment repair or replacement for bronchoscopy. reviewed consultant notes.   -Cont isolation precautions       Syncope:  - Likely vasovagal   - In the setting of dehydration uncontrolled DM, viral illness  - PE ruled out  - CT head neg  - 2D echo with preserved EF, no acute path    DM type 2. controlled. Blood sugars low again and now with headache.   Decrease lantus and premeal insulin. follow finger sticks.   - DKA ruled out  - Hb A1c 10.6  Endo on board.     Pseudohyponatremia:  - Corrected Na ok      Severe protein-calorie malnutrition:  - Nutritional supplements.       MERCEDES:  - Resolved  - B/L non obstructing stone, outpt follow up     Headache. start prn tylenol.     DVT ppx

## 2020-07-18 NOTE — PROGRESS NOTE ADULT - SUBJECTIVE AND OBJECTIVE BOX
INTERVAL HPI:  60 yr male, reports being  diagnosed of latent TB(not clear how) and started on Rifampin and Isoniazid 3 months ago while in [facility]. Reports had cough and fever in April.  [place], went to [facility] in January in January but could not return due to COVID.  Came to [place] 2 weeks ago,  daughter noticed 20 pound  wt loss, weakness, poor appetite, constantly thirsty, increased urination. They attributed it to the TB medications Took  him to  PMD, who recommended that he go to ED, passed out-( slumped over while sitting in a chair ) and did not respond for couple of minutes.  In ED noted severe hyperglycemia(new diagnosis of DM) but not in DKA. IVF and Insulin given. (08 Jul 2020 16:55).  AS above had cough and fever in April which improved after getting started on INH+ Rifampin.   Denied fever, chills,  cough , sputum production or night sweats.    OVERNIGHT EVENTS:  Comfortable in chair.    Vital Signs Last 24 Hrs  T(C): 35.7 (18 Jul 2020 11:22), Max: 36.7 (17 Jul 2020 17:00)  T(F): 96.3 (18 Jul 2020 11:22), Max: 98.1 (17 Jul 2020 17:00)  HR: 76 (18 Jul 2020 11:22) (76 - 86)  BP: 116/61 (18 Jul 2020 11:22) (115/58 - 129/51)  BP(mean): --  RR: 17 (18 Jul 2020 11:22) (17 - 18)  SpO2: 98% (18 Jul 2020 11:22) (97% - 98%)    PHYSICAL EXAM:  GEN:         Awake, responsive and comfortable.  HEENT:    Normal.    RESP:        no distress  CVS:          Regular rate and rhythm.   ABD:         Soft, non-tender, non-distended;     MEDICATIONS  (STANDING):  dextrose 5%. 1000 milliLiter(s) (50 mL/Hr) IV Continuous <Continuous>  dextrose 50% Injectable 12.5 Gram(s) IV Push once  dextrose 50% Injectable 25 Gram(s) IV Push once  dextrose 50% Injectable 25 Gram(s) IV Push once  enoxaparin Injectable 40 milliGRAM(s) SubCutaneous daily  insulin glargine Injectable (LANTUS) 15 Unit(s) SubCutaneous at bedtime  insulin lispro (HumaLOG) corrective regimen sliding scale   SubCutaneous three times a day before meals  insulin lispro Injectable (HumaLOG) 6 Unit(s) SubCutaneous three times a day before meals  pyridoxine 50 milliGRAM(s) Oral daily    MEDICATIONS  (PRN):  acetaminophen   Tablet .. 650 milliGRAM(s) Oral every 6 hours PRN Moderate Pain (4 - 6)  dextrose 40% Gel 15 Gram(s) Oral once PRN Blood Glucose LESS THAN 70 milliGRAM(s)/deciliter  glucagon  Injectable 1 milliGRAM(s) IntraMuscular once PRN Glucose LESS THAN 70 milligrams/deciliter    LABS:                        11.5   7.55  )-----------( 381      ( 17 Jul 2020 06:28 )             33.2      ASSESSMENT AND PLAN:  ·	RUL opacities suspicious for TB in a pt from [facility] with +ve QuantiFeron Gold and weight loss.  ·	Newly diagnosed DM  ·	Hyperglycemia.  ·	New DM  ·	Dehydration.    Awaiting equipment availability for Bronchoscopy.

## 2020-07-18 NOTE — PROGRESS NOTE ADULT - SUBJECTIVE AND OBJECTIVE BOX
Patient is a 60y old  Male who presents with a chief complaint of syncope (18 Jul 2020 11:09)      Interval History: finger sticks ae stable except occasional increase   on Lantus 15 units and prandial lispro 6 units       MEDICATIONS  (STANDING):  dextrose 5%. 1000 milliLiter(s) (50 mL/Hr) IV Continuous <Continuous>  dextrose 50% Injectable 12.5 Gram(s) IV Push once  dextrose 50% Injectable 25 Gram(s) IV Push once  dextrose 50% Injectable 25 Gram(s) IV Push once  enoxaparin Injectable 40 milliGRAM(s) SubCutaneous daily  insulin glargine Injectable (LANTUS) 15 Unit(s) SubCutaneous at bedtime  insulin lispro (HumaLOG) corrective regimen sliding scale   SubCutaneous three times a day before meals  insulin lispro Injectable (HumaLOG) 6 Unit(s) SubCutaneous three times a day before meals  pyridoxine 50 milliGRAM(s) Oral daily    MEDICATIONS  (PRN):  acetaminophen   Tablet .. 650 milliGRAM(s) Oral every 6 hours PRN Moderate Pain (4 - 6)  dextrose 40% Gel 15 Gram(s) Oral once PRN Blood Glucose LESS THAN 70 milliGRAM(s)/deciliter  glucagon  Injectable 1 milliGRAM(s) IntraMuscular once PRN Glucose LESS THAN 70 milligrams/deciliter      Allergies    No Known Allergies    Intolerances        REVIEW OF SYSTEMS: Could not be assessed   CONSTITUTIONAL: no changes      Vital Signs Last 24 Hrs  T(C): 36.7 (18 Jul 2020 17:23), Max: 36.7 (18 Jul 2020 17:23)  T(F): 98 (18 Jul 2020 17:23), Max: 98 (18 Jul 2020 17:23)  HR: 80 (18 Jul 2020 17:23) (76 - 86)  BP: 133/72 (18 Jul 2020 17:23) (115/58 - 133/72)  BP(mean): --  RR: 18 (18 Jul 2020 17:23) (17 - 18)  SpO2: 99% (18 Jul 2020 17:23) (98% - 99%)    PHYSICAL EXAM:  GENERAL: in isolation    LABS:        CAPILLARY BLOOD GLUCOSE      POCT Blood Glucose.: 111 mg/dL (18 Jul 2020 17:22)  POCT Blood Glucose.: 58 mg/dL (18 Jul 2020 16:44)  POCT Blood Glucose.: 58 mg/dL (18 Jul 2020 16:40)  POCT Blood Glucose.: 386 mg/dL (18 Jul 2020 10:56)  POCT Blood Glucose.: 141 mg/dL (18 Jul 2020 08:33)  POCT Blood Glucose.: 68 mg/dL (18 Jul 2020 08:04)  POCT Blood Glucose.: 67 mg/dL (18 Jul 2020 08:02)  POCT Blood Glucose.: 103 mg/dL (17 Jul 2020 21:07)    Lipid panel:           Thyroid:  Diabetes Tests:  Parathyroid Panel:  Adrenals:  RADIOLOGY & ADDITIONAL TESTS:    Imaging Personally Reviewed:  [ ] YES  [ ] NO    Consultant(s) Notes Reviewed:  [ ] YES  [ ] NO    Care Discussed with Consultants/Other Providers [ ] YES  [ ] NO

## 2020-07-19 LAB
GLUCOSE BLDC GLUCOMTR-MCNC: 269 MG/DL — HIGH (ref 70–99)
GLUCOSE BLDC GLUCOMTR-MCNC: 288 MG/DL — HIGH (ref 70–99)
GLUCOSE BLDC GLUCOMTR-MCNC: 72 MG/DL — SIGNIFICANT CHANGE UP (ref 70–99)
GLUCOSE BLDC GLUCOMTR-MCNC: 84 MG/DL — SIGNIFICANT CHANGE UP (ref 70–99)

## 2020-07-19 PROCEDURE — 99232 SBSQ HOSP IP/OBS MODERATE 35: CPT

## 2020-07-19 RX ORDER — INSULIN LISPRO 100/ML
4 VIAL (ML) SUBCUTANEOUS
Refills: 0 | Status: DISCONTINUED | OUTPATIENT
Start: 2020-07-19 | End: 2020-07-20

## 2020-07-19 RX ORDER — INSULIN GLARGINE 100 [IU]/ML
12 INJECTION, SOLUTION SUBCUTANEOUS AT BEDTIME
Refills: 0 | Status: DISCONTINUED | OUTPATIENT
Start: 2020-07-19 | End: 2020-07-20

## 2020-07-19 RX ADMIN — Medication 4 UNIT(S): at 11:45

## 2020-07-19 RX ADMIN — INSULIN GLARGINE 12 UNIT(S): 100 INJECTION, SOLUTION SUBCUTANEOUS at 21:46

## 2020-07-19 NOTE — PROGRESS NOTE ADULT - ASSESSMENT
60 yr old with Latent TB from [facility] with wt loss, constant thirst, poor appetite, weakness and increased urination .    (I used creole speaking audio interpretor for my communication with the patient since 7/15/20)    R/O Pulm TB:  - H/O latent TB on meds since march  - As per daughter no H/o COVID-19, Neg Ab  - CT with rt upper lobe infiltrate  - Now on rifampin, and INH regime, held now per ID prior bronch  - Sputum for AFB neg X 3 with sputum indiction   - Positive QuantiFeron, Neg HIV  - ID and pulm following, cardio thoracic consulted. Awaiting equipment repair or replacement for bronchoscopy. reviewed consultant notes.   -Cont isolation precautions       Syncope:  - Likely vasovagal   - In the setting of dehydration uncontrolled DM, viral illness  - PE ruled out  - CT head neg  - 2D echo with preserved EF, no acute path    DM type 2.  Blood sugars low again  Decrease lantus again along with premeal insulin. follow finger sticks.   - DKA ruled out  - Hb A1c 10.6  Endo on board.     Pseudohyponatremia:  - Corrected Na ok      Severe protein-calorie malnutrition:  - Nutritional supplements.       MERCEDES:  - Resolved  - B/L non obstructing stone, outpt follow up     Headache. Cont prn tylenol.     Blurred vision. Needs outpatient Ophthalmologist follow up.    DVT ppx

## 2020-07-19 NOTE — PROGRESS NOTE ADULT - SUBJECTIVE AND OBJECTIVE BOX
INTERVAL HPI:  60 yr male, reports being  diagnosed of latent TB(not clear how) and started on Rifampin and Isoniazid 3 months ago while in [facility]. Reports had cough and fever in April.  [place], went to [facility] in January in January but could not return due to COVID.  Came to [place] 2 weeks ago,  daughter noticed 20 pound  wt loss, weakness, poor appetite, constantly thirsty, increased urination. They attributed it to the TB medications Took  him to  PMD, who recommended that he go to ED, passed out-( slumped over while sitting in a chair ) and did not respond for couple of minutes.  In ED noted severe hyperglycemia(new diagnosis of DM) but not in DKA. IVF and Insulin given. (08 Jul 2020 16:55).  AS above had cough and fever in April which improved after getting started on INH+ Rifampin.   Denied fever, chills,  cough , sputum production or night sweats.    OVERNIGHT EVENTS:  No new complains.    Vital Signs Last 24 Hrs  T(C): 36.8 (19 Jul 2020 17:00), Max: 36.8 (19 Jul 2020 11:28)  T(F): 98.2 (19 Jul 2020 17:00), Max: 98.3 (19 Jul 2020 11:28)  HR: 79 (19 Jul 2020 17:00) (79 - 81)  BP: 115/56 (19 Jul 2020 17:00) (110/43 - 115/56)  BP(mean): --  RR: 17 (19 Jul 2020 17:00) (16 - 18)  SpO2: 98% (19 Jul 2020 17:00) (98% - 98%)    PHYSICAL EXAM:  GEN:         Awake, responsive and comfortable.  HEENT:    Normal.    RESP:        no distress  CVS:          Regular rate and rhythm.   ABD:         Soft, non-tender, non-distended;     MEDICATIONS  (STANDING):  dextrose 5%. 1000 milliLiter(s) (50 mL/Hr) IV Continuous <Continuous>  dextrose 50% Injectable 12.5 Gram(s) IV Push once  dextrose 50% Injectable 25 Gram(s) IV Push once  dextrose 50% Injectable 25 Gram(s) IV Push once  enoxaparin Injectable 40 milliGRAM(s) SubCutaneous daily  insulin glargine Injectable (LANTUS) 12 Unit(s) SubCutaneous at bedtime  insulin lispro (HumaLOG) corrective regimen sliding scale   SubCutaneous three times a day before meals  insulin lispro Injectable (HumaLOG) 4 Unit(s) SubCutaneous three times a day before meals  pyridoxine 50 milliGRAM(s) Oral daily    MEDICATIONS  (PRN):  acetaminophen   Tablet .. 650 milliGRAM(s) Oral every 6 hours PRN Moderate Pain (4 - 6)  dextrose 40% Gel 15 Gram(s) Oral once PRN Blood Glucose LESS THAN 70 milliGRAM(s)/deciliter  glucagon  Injectable 1 milliGRAM(s) IntraMuscular once PRN Glucose LESS THAN 70 milligrams/deciliter     ASSESSMENT AND PLAN:  ·	RUL opacities suspicious for TB in a pt from [facility] with +ve QuantiFeron Gold and weight loss.  ·	Newly diagnosed DM  ·	Hyperglycemia.  ·	New DM  ·	Dehydration.    Still awaiting for bronchoscopy once equipment is available.  Off INH and Rifampin ti increase yeild.

## 2020-07-19 NOTE — PROGRESS NOTE ADULT - SUBJECTIVE AND OBJECTIVE BOX
HPI:  60 yr old diagnosed of latent TB and started on Rifampin and Isoniazid 3 months ago while in [facility]. He came to [place] 2 weeks ago and the dgtr has noticed 20 pds wt loss, weakness, poor appetite, constantly thirsty, increased urination. They attributed it to the TB medications SE. They did take him to their PMD, who recommended that he go to ED. However patient passed out- slumped over while sitting in a chair and did not respond for couple of minutes. Upon shaking after he came awake and said he was fine. Dgtr brought him to ED with concerns.     In ED noted severe hyperglycemia and not in DKA. IVF and Insulin given. (08 Jul 2020 16:55)      Allergies    No Known Allergies    Intolerances        MEDICATIONS  (STANDING):  dextrose 5%. 1000 milliLiter(s) (50 mL/Hr) IV Continuous <Continuous>  dextrose 50% Injectable 12.5 Gram(s) IV Push once  dextrose 50% Injectable 25 Gram(s) IV Push once  dextrose 50% Injectable 25 Gram(s) IV Push once  enoxaparin Injectable 40 milliGRAM(s) SubCutaneous daily  insulin glargine Injectable (LANTUS) 12 Unit(s) SubCutaneous at bedtime  insulin lispro (HumaLOG) corrective regimen sliding scale   SubCutaneous three times a day before meals  insulin lispro Injectable (HumaLOG) 4 Unit(s) SubCutaneous three times a day before meals  pyridoxine 50 milliGRAM(s) Oral daily    MEDICATIONS  (PRN):  acetaminophen   Tablet .. 650 milliGRAM(s) Oral every 6 hours PRN Moderate Pain (4 - 6)  dextrose 40% Gel 15 Gram(s) Oral once PRN Blood Glucose LESS THAN 70 milliGRAM(s)/deciliter  glucagon  Injectable 1 milliGRAM(s) IntraMuscular once PRN Glucose LESS THAN 70 milligrams/deciliter      REVIEW OF SYSTEMS:    CONSTITUTIONAL: No fever, chills, weight loss, or fatigue  HEENT: No sore throat, runny nose, ear ache  RESPIRATORY: No cough, wheezing, No shortness of breath  CARDIOVASCULAR: No chest pain, palpitations, dizziness  GASTROINTESTINAL: No abdominal pain. No nausea, vomiting, diarrhea  GENITOURINARY: No dysuria, increase frequency, hematuria, or incontinence  NEUROLOGICAL: No headaches, memory loss, loss of strength, numbness, or tremors, no weakness  EXTREMITY: No pedal edema BLE  SKIN: No itching, burning, rashes, or lesions     VITAL SIGNS:  T(C): 36.8 (07-19-20 @ 17:00), Max: 36.8 (07-19-20 @ 11:28)  T(F): 98.2 (07-19-20 @ 17:00), Max: 98.3 (07-19-20 @ 11:28)  HR: 79 (07-19-20 @ 17:00) (79 - 81)  BP: 115/56 (07-19-20 @ 17:00) (110/43 - 115/56)  RR: 17 (07-19-20 @ 17:00) (16 - 18)  SpO2: 98% (07-19-20 @ 17:00) (98% - 98%)  Wt(kg): --    PHYSICAL EXAM:    GENERAL: not in any distress  HEENT: Neck is supple, normocephalic, atraumatic   CHEST/LUNG: Clear to auscultation bilaterally; No rales, rhonchi, wheezing  HEART: Regular rate and rhythm; No murmurs, rubs, or gallops  ABDOMEN: Soft, Nontender, Nondistended; Bowel sounds present, no rebound   EXTREMITIES:  2+ Peripheral Pulses, No clubbing, cyanosis, or edema  GENITOURINARY:   SKIN: No rashes or lesions  BACK: no pressor sore   NERVOUS SYSTEM:  Alert & Oriented X3, Good concentration  PSYCH: normal affect     LABS:                                                   Radiology: HPI:  60 yr old diagnosed of latent TB and started on Rifampin and Isoniazid 3 months ago while in [facility]. He came to [place] 2 weeks ago and the dgtr has noticed 20 pds wt loss, weakness, poor appetite, constantly thirsty, increased urination. They attributed it to the TB medications SE. They did take him to their PMD, who recommended that he go to ED. However patient passed out- slumped over while sitting in a chair and did not respond for couple of minutes. Upon shaking after he came awake and said he was fine. Dgtr brought him to ED with concerns.   In ED noted severe hyperglycemia and not in DKA. IVF and Insulin given. (08 Jul 2020 16:55)  also work up consistent with bilateral upper lung infiltrates  here 3 sputa for smears NEGATIVE but today broth pos for afb on day 12    Allergies    No Known Allergies    Intolerances        MEDICATIONS  (STANDING):  dextrose 5%. 1000 milliLiter(s) (50 mL/Hr) IV Continuous <Continuous>  dextrose 50% Injectable 12.5 Gram(s) IV Push once  dextrose 50% Injectable 25 Gram(s) IV Push once  dextrose 50% Injectable 25 Gram(s) IV Push once  enoxaparin Injectable 40 milliGRAM(s) SubCutaneous daily  insulin glargine Injectable (LANTUS) 12 Unit(s) SubCutaneous at bedtime  insulin lispro (HumaLOG) corrective regimen sliding scale   SubCutaneous three times a day before meals  insulin lispro Injectable (HumaLOG) 4 Unit(s) SubCutaneous three times a day before meals  pyridoxine 50 milliGRAM(s) Oral daily    MEDICATIONS  (PRN):  acetaminophen   Tablet .. 650 milliGRAM(s) Oral every 6 hours PRN Moderate Pain (4 - 6)  dextrose 40% Gel 15 Gram(s) Oral once PRN Blood Glucose LESS THAN 70 milliGRAM(s)/deciliter  glucagon  Injectable 1 milliGRAM(s) IntraMuscular once PRN Glucose LESS THAN 70 milligrams/deciliter      REVIEW OF SYSTEMS:  feels ok   no issues   CONSTITUTIONAL: No fever, chills, weight loss, or fatigue  HEENT: No sore throat, runny nose, ear ache  RESPIRATORY: No cough, wheezing, No shortness of breath  CARDIOVASCULAR: No chest pain, palpitations, dizziness  GASTROINTESTINAL: No abdominal pain. No nausea, vomiting, diarrhea  GENITOURINARY: No dysuria, increase frequency, hematuria, or incontinence  NEUROLOGICAL: No headaches, memory loss, loss of strength, numbness, or tremors, no weakness  EXTREMITY: No pedal edema BLE  SKIN: No itching, burning, rashes, or lesions     VITAL SIGNS:  T(C): 36.8 (07-19-20 @ 17:00), Max: 36.8 (07-19-20 @ 11:28)  T(F): 98.2 (07-19-20 @ 17:00), Max: 98.3 (07-19-20 @ 11:28)  HR: 79 (07-19-20 @ 17:00) (79 - 81)  BP: 115/56 (07-19-20 @ 17:00) (110/43 - 115/56)  RR: 17 (07-19-20 @ 17:00) (16 - 18)  SpO2: 98% (07-19-20 @ 17:00) (98% - 98%)  Wt(kg): --    PHYSICAL EXAM:    GENERAL: not in any distress  HEENT: Neck is supple, normocephalic, atraumatic   CHEST/LUNG: Clear to auscultation bilaterally; No rales, rhonchi, wheezing  HEART: Regular rate and rhythm; No murmurs, rubs, or gallops  ABDOMEN: Soft, Nontender, Nondistended; Bowel sounds present, no rebound   EXTREMITIES:  2+ Peripheral Pulses, No clubbing, cyanosis, or edema  thin   SKIN: No rashes or lesions  BACK: no pressor sore   NERVOUS SYSTEM:  Alert & Oriented X3, Good concentration  PSYCH: normal affect     LABS:                                                   Radiology:    < from: CT Abdomen and Pelvis w/ IV Cont (07.06.20 @ 14:41) >  IMPRESSION:   Right upper lobe opacities, possibly infectious. Recommend continued follow-up to resolution.  Negative for pulmonary embolism.  Bilateral nonobstructive nephrolithiasis. No hydronephrosis.                GRACE SALVADOR M.D., ATTENDING RADIOLOGIST  This document has been electronically signed. Jul 6 2020  3:08PM                < end of copied text >

## 2020-07-19 NOTE — PROGRESS NOTE ADULT - SUBJECTIVE AND OBJECTIVE BOX
CHIEF COMPLAINT: Headache better.   Blurred vision bilaterally x couple of days. no eye pain or discharge. no focal new motor deficit.   low blood sugars again this morning.   no fever  no diarrhea  no chest pain  no shortness of breath at rest.       PHYSICAL EXAM:    GENERAL: Thinly built. no Acute distress  CHEST/LUNG: Clear to ausculation bilaterally, no wheezing, no crackles   HEART: Regular rate and rhythm; No murmurs, rubs  ABDOMEN: Soft, Nontender, Nondistended; Bowel sounds present  EXTREMITIES:  Moving all four extremities spontaneously, No clubbing, cyanosis, or edema  NERVOUS SYSTEM:  Grossly non focal.  Psychiatry: AAO x 3, mood is appropriate       OBJECTIVE DATA:       Vital Signs Last 24 Hrs  T(C): 36.6 (19 Jul 2020 04:47), Max: 36.7 (18 Jul 2020 17:23)  T(F): 97.8 (19 Jul 2020 04:47), Max: 98 (18 Jul 2020 17:23)  HR: 80 (19 Jul 2020 04:47) (76 - 80)  BP: 110/43 (19 Jul 2020 04:47) (110/43 - 133/72)  BP(mean): --  RR: 17 (19 Jul 2020 04:47) (17 - 18)  SpO2: 98% (19 Jul 2020 04:47) (98% - 99%)           Daily     Daily 	    CAPILLARY BLOOD GLUCOSE      POCT Blood Glucose.: 72 mg/dL (19 Jul 2020 07:49)      MEDICATIONS  (STANDING):  dextrose 5%. 1000 milliLiter(s) (50 mL/Hr) IV Continuous <Continuous>  dextrose 50% Injectable 12.5 Gram(s) IV Push once  dextrose 50% Injectable 25 Gram(s) IV Push once  dextrose 50% Injectable 25 Gram(s) IV Push once  enoxaparin Injectable 40 milliGRAM(s) SubCutaneous daily  insulin glargine Injectable (LANTUS) 12 Unit(s) SubCutaneous at bedtime  insulin lispro (HumaLOG) corrective regimen sliding scale   SubCutaneous three times a day before meals  insulin lispro Injectable (HumaLOG) 4 Unit(s) SubCutaneous three times a day before meals  pyridoxine 50 milliGRAM(s) Oral daily    MEDICATIONS  (PRN):  acetaminophen   Tablet .. 650 milliGRAM(s) Oral every 6 hours PRN Moderate Pain (4 - 6)  dextrose 40% Gel 15 Gram(s) Oral once PRN Blood Glucose LESS THAN 70 milliGRAM(s)/deciliter  glucagon  Injectable 1 milliGRAM(s) IntraMuscular once PRN Glucose LESS THAN 70 milligrams/deciliter

## 2020-07-19 NOTE — PROGRESS NOTE ADULT - SUBJECTIVE AND OBJECTIVE BOX
Patient is a 60y old  Male who presents with a chief complaint of p (19 Jul 2020 18:16)      Interval History: finger sticks are stable  and in 100's   on Lantus 12 units and prandial lispro 4 units     MEDICATIONS  (STANDING):  dextrose 5%. 1000 milliLiter(s) (50 mL/Hr) IV Continuous <Continuous>  dextrose 50% Injectable 12.5 Gram(s) IV Push once  dextrose 50% Injectable 25 Gram(s) IV Push once  dextrose 50% Injectable 25 Gram(s) IV Push once  enoxaparin Injectable 40 milliGRAM(s) SubCutaneous daily  insulin glargine Injectable (LANTUS) 12 Unit(s) SubCutaneous at bedtime  insulin lispro (HumaLOG) corrective regimen sliding scale   SubCutaneous three times a day before meals  insulin lispro Injectable (HumaLOG) 4 Unit(s) SubCutaneous three times a day before meals  pyridoxine 50 milliGRAM(s) Oral daily    MEDICATIONS  (PRN):  acetaminophen   Tablet .. 650 milliGRAM(s) Oral every 6 hours PRN Moderate Pain (4 - 6)  dextrose 40% Gel 15 Gram(s) Oral once PRN Blood Glucose LESS THAN 70 milliGRAM(s)/deciliter  glucagon  Injectable 1 milliGRAM(s) IntraMuscular once PRN Glucose LESS THAN 70 milligrams/deciliter      Allergies    No Known Allergies    Intolerances        REVIEW OF SYSTEMS:  CONSTITUTIONAL: no changes  EYES: No eye pain, visual disturbances, or discharge    Vital Signs Last 24 Hrs  T(C): 36.8 (19 Jul 2020 17:00), Max: 36.8 (19 Jul 2020 11:28)  T(F): 98.2 (19 Jul 2020 17:00), Max: 98.3 (19 Jul 2020 11:28)  HR: 79 (19 Jul 2020 17:00) (79 - 81)  BP: 115/56 (19 Jul 2020 17:00) (110/43 - 115/56)  BP(mean): --  RR: 17 (19 Jul 2020 17:00) (16 - 18)  SpO2: 98% (19 Jul 2020 17:00) (98% - 98%)    PHYSICAL EXAM:  GENERAL: as per the progress notes of Primary Team     LABS:        CAPILLARY BLOOD GLUCOSE      POCT Blood Glucose.: 269 mg/dL (19 Jul 2020 21:12)  POCT Blood Glucose.: 84 mg/dL (19 Jul 2020 16:22)  POCT Blood Glucose.: 288 mg/dL (19 Jul 2020 11:16)  POCT Blood Glucose.: 72 mg/dL (19 Jul 2020 07:49)    Lipid panel:           Thyroid:  Diabetes Tests:  Parathyroid Panel:  Adrenals:  RADIOLOGY & ADDITIONAL TESTS:    Imaging Personally Reviewed:  [ ] YES  [ ] NO    Consultant(s) Notes Reviewed:  [ ] YES  [ ] NO    Care Discussed with Consultants/Other Providers [ ] YES  [ ] NO

## 2020-07-19 NOTE — PROGRESS NOTE ADULT - ASSESSMENT
weight loss and cough with + QF gold but esr normal???  concerning as afb was done while on tx for latent TB deceasing its yield     hiv test neg   neg  millie, anca   for bronchoscopy   concerning as afb were done while on latent tb treatment  held tb meds to increase yield   clinically my beaseline   not toxic appearing  will be discussed with JOHANNE re discharge plan weight loss and cough with + QF gold but esr normal???  concerning as afb was done while on tx for latent TB deceasing its yield   hiv test neg   neg  millie, anca   for bronchoscopy   clinically my beaseline   not toxic appearing  will be discussed with JOHANNE re discharge plan   afb culture grew in 12 days ; this is not mycobacterium tuberculosis alcides as grew in 4 days   await pcr for ID   mantain isolation

## 2020-07-20 LAB
GLUCOSE BLDC GLUCOMTR-MCNC: 125 MG/DL — HIGH (ref 70–99)
GLUCOSE BLDC GLUCOMTR-MCNC: 213 MG/DL — HIGH (ref 70–99)
GLUCOSE BLDC GLUCOMTR-MCNC: 283 MG/DL — HIGH (ref 70–99)
GLUCOSE BLDC GLUCOMTR-MCNC: 73 MG/DL — SIGNIFICANT CHANGE UP (ref 70–99)

## 2020-07-20 PROCEDURE — 99232 SBSQ HOSP IP/OBS MODERATE 35: CPT

## 2020-07-20 RX ORDER — INSULIN LISPRO 100/ML
2 VIAL (ML) SUBCUTANEOUS
Refills: 0 | Status: DISCONTINUED | OUTPATIENT
Start: 2020-07-20 | End: 2020-07-24

## 2020-07-20 RX ORDER — INSULIN GLARGINE 100 [IU]/ML
8 INJECTION, SOLUTION SUBCUTANEOUS AT BEDTIME
Refills: 0 | Status: DISCONTINUED | OUTPATIENT
Start: 2020-07-20 | End: 2020-07-21

## 2020-07-20 RX ADMIN — INSULIN GLARGINE 8 UNIT(S): 100 INJECTION, SOLUTION SUBCUTANEOUS at 21:59

## 2020-07-20 RX ADMIN — Medication 2 UNIT(S): at 17:00

## 2020-07-20 RX ADMIN — Medication 2 UNIT(S): at 12:15

## 2020-07-20 NOTE — PROGRESS NOTE ADULT - SUBJECTIVE AND OBJECTIVE BOX
HPI:  60 yr old diagnosed of latent TB and started on Rifampin and Isoniazid 3 months ago while in [facility]. He came to [place] 2 weeks ago and the dgtr has noticed 20 pds wt loss, weakness, poor appetite, constantly thirsty, increased urination. They attributed it to the TB medications SE. They did take him to their PMD, who recommended that he go to ED. However patient passed out- slumped over while sitting in a chair and did not respond for couple of minutes. Upon shaking after he came awake and said he was fine. Dgtr brought him to ED with concerns.     In ED noted severe hyperglycemia and not in DKA. IVF and Insulin given. (08 Jul 2020 16:55)      Allergies    No Known Allergies    Intolerances        MEDICATIONS  (STANDING):  dextrose 5%. 1000 milliLiter(s) (50 mL/Hr) IV Continuous <Continuous>  dextrose 50% Injectable 12.5 Gram(s) IV Push once  dextrose 50% Injectable 25 Gram(s) IV Push once  dextrose 50% Injectable 25 Gram(s) IV Push once  enoxaparin Injectable 40 milliGRAM(s) SubCutaneous daily  insulin glargine Injectable (LANTUS) 8 Unit(s) SubCutaneous at bedtime  insulin lispro (HumaLOG) corrective regimen sliding scale   SubCutaneous three times a day before meals  insulin lispro Injectable (HumaLOG) 2 Unit(s) SubCutaneous three times a day before meals  pyridoxine 50 milliGRAM(s) Oral daily    MEDICATIONS  (PRN):  acetaminophen   Tablet .. 650 milliGRAM(s) Oral every 6 hours PRN Moderate Pain (4 - 6)  dextrose 40% Gel 15 Gram(s) Oral once PRN Blood Glucose LESS THAN 70 milliGRAM(s)/deciliter  glucagon  Injectable 1 milliGRAM(s) IntraMuscular once PRN Glucose LESS THAN 70 milligrams/deciliter      REVIEW OF SYSTEMS:    CONSTITUTIONAL: No fever, chills, weight loss, or fatigue  HEENT: No sore throat, runny nose, ear ache  RESPIRATORY: No cough, wheezing, No shortness of breath  CARDIOVASCULAR: No chest pain, palpitations, dizziness  GASTROINTESTINAL: No abdominal pain. No nausea, vomiting, diarrhea  GENITOURINARY: No dysuria, increase frequency, hematuria, or incontinence  NEUROLOGICAL: No headaches, memory loss, loss of strength, numbness, or tremors, no weakness  EXTREMITY: No pedal edema BLE  SKIN: No itching, burning, rashes, or lesions     VITAL SIGNS:  T(C): 36.6 (07-20-20 @ 04:20), Max: 36.8 (07-19-20 @ 17:00)  T(F): 97.9 (07-20-20 @ 04:20), Max: 98.3 (07-20-20 @ 00:30)  HR: 76 (07-20-20 @ 04:20) (76 - 81)  BP: 106/61 (07-20-20 @ 04:20) (106/61 - 115/56)  RR: 18 (07-20-20 @ 04:20) (17 - 18)  SpO2: 96% (07-20-20 @ 04:20) (96% - 98%)  Wt(kg): --    PHYSICAL EXAM:    GENERAL: not in any distress  HEENT: Neck is supple, normocephalic, atraumatic   CHEST/LUNG: Clear to percussion bilaterally; No rales, rhonchi, wheezing  HEART: Regular rate and rhythm; No murmurs, rubs, or gallops  ABDOMEN: Soft, Nontender, Nondistended; Bowel sounds present, no rebound   EXTREMITIES:  2+ Peripheral Pulses, No clubbing, cyanosis, or edema  GENITOURINARY:   SKIN: No rashes or lesions  BACK: no pressor sore   NERVOUS SYSTEM:  Alert & Oriented X3, Good concentration  PSYCH: normal affect     LABS:     Radiology:

## 2020-07-20 NOTE — PROGRESS NOTE ADULT - SUBJECTIVE AND OBJECTIVE BOX
CHIEF COMPLAINT: Headache improved.   Blood sugars better and no hypoglycemic symptoms.   no fever  no diarrhea  no chest pain  no shortness of breath at rest.       PHYSICAL EXAM:    GENERAL: Thinly built. no Acute distress  CHEST/LUNG: Clear to ausculation bilaterally, no wheezing, no crackles   HEART: Regular rate and rhythm; No murmurs, rubs  ABDOMEN: Soft, Nontender, Nondistended; Bowel sounds present  EXTREMITIES:  Moving all four extremities spontaneously, No clubbing, cyanosis, or edema  NERVOUS SYSTEM:  Grossly non focal.  Psychiatry: AAO x 3, mood is appropriate       OBJECTIVE DATA:       Vital Signs Last 24 Hrs  T(C): 36.6 (2020 04:20), Max: 36.8 (2020 17:00)  T(F): 97.9 (2020 04:20), Max: 98.3 (2020 00:30)  HR: 76 (2020 04:20) (76 - 81)  BP: 106/61 (2020 04:20) (106/61 - 115/56)  BP(mean): --  RR: 18 (2020 04:20) (17 - 18)  SpO2: 96% (2020 04:20) (96% - 98%)           Daily     Daily Weight in k.7 (2020 04:20)  	    CAPILLARY BLOOD GLUCOSE      POCT Blood Glucose.: 283 mg/dL (2020 12:13)      MEDICATIONS  (STANDING):  dextrose 5%. 1000 milliLiter(s) (50 mL/Hr) IV Continuous <Continuous>  dextrose 50% Injectable 12.5 Gram(s) IV Push once  dextrose 50% Injectable 25 Gram(s) IV Push once  dextrose 50% Injectable 25 Gram(s) IV Push once  enoxaparin Injectable 40 milliGRAM(s) SubCutaneous daily  insulin glargine Injectable (LANTUS) 8 Unit(s) SubCutaneous at bedtime  insulin lispro (HumaLOG) corrective regimen sliding scale   SubCutaneous three times a day before meals  insulin lispro Injectable (HumaLOG) 2 Unit(s) SubCutaneous three times a day before meals  pyridoxine 50 milliGRAM(s) Oral daily    MEDICATIONS  (PRN):  acetaminophen   Tablet .. 650 milliGRAM(s) Oral every 6 hours PRN Moderate Pain (4 - 6)  dextrose 40% Gel 15 Gram(s) Oral once PRN Blood Glucose LESS THAN 70 milliGRAM(s)/deciliter  glucagon  Injectable 1 milliGRAM(s) IntraMuscular once PRN Glucose LESS THAN 70 milligrams/deciliter

## 2020-07-20 NOTE — PROGRESS NOTE ADULT - ASSESSMENT
weight loss and cough with + QF gold but esr normal???  concerning as afb was done while on tx for latent TB deceasing its yield     Pulmonology following possible bronchoscopy    HIV: NR  DANIELLE, ANCA: Negative      For bronchoscopy  concerning as afb were done while on latent tb treatment  held tb meds to increase yield   -Continue Isolation Precautions weight loss and cough with + QF gold but esr normal???  concerning as afb was done while on tx for latent TB deceasing its yield     Pulmonology following possible bronchoscopy    HIV: NR  DANIELLE, ANCA: Negative    7/9 AFB: Growth AFB detected  7/10 AFB: negative so far  7/11 AFB: negative so far    TB-PCR: pending      For bronchoscopy  concerning as afb were done while on latent tb treatment  held tb meds to increase yield   -Continue Isolation Precautions  -If TB-PCR is positive case will be discuses with JOHANNE for possible treatment options, suspected MDR because patient was for 3 month on INH+Rimfampin

## 2020-07-20 NOTE — PROGRESS NOTE ADULT - ASSESSMENT
60 yr old with Latent TB from [facility] with wt loss, constant thirst, poor appetite, weakness and increased urination .    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   - Positive QuantiFeron, Neg HIV  - Follow ID recs about bronchoscopic (now that one sputum culture came back positive for AFB). Discussed with Dr Daniel already.       Syncope:  - Likely vasovagal   - In the setting of dehydration uncontrolled DM, viral illness  - PE ruled out  - CT head neg  - 2D echo with preserved EF, no acute path    DM type 2.  Blood sugars better but still fluctuating.   cont current regimen of insulin. Follow finger sticks.   - DKA ruled out  - Hb A1c 10.6  Endo on board.     Pseudohyponatremia:  - Corrected Na ok      Severe protein-calorie malnutrition:  - Nutritional supplements.       MERCEDES:  - Resolved  - B/L non obstructing stone, outpt follow up     Headache. Cont prn tylenol.     Blurred vision. Needs outpatient Ophthalmologist follow up.    DVT ppx

## 2020-07-20 NOTE — PROGRESS NOTE ADULT - SUBJECTIVE AND OBJECTIVE BOX
Patient is a 60y old  Male who presents with a chief complaint of p (19 Jul 2020 18:16)      INTERVAL HPI/OVERNIGHT EVENTS:  no events overnight  gluocse trending low  pt NAD, sitting up in chair    MEDICATIONS  (STANDING):  dextrose 5%. 1000 milliLiter(s) (50 mL/Hr) IV Continuous <Continuous>  dextrose 50% Injectable 12.5 Gram(s) IV Push once  dextrose 50% Injectable 25 Gram(s) IV Push once  dextrose 50% Injectable 25 Gram(s) IV Push once  enoxaparin Injectable 40 milliGRAM(s) SubCutaneous daily  insulin glargine Injectable (LANTUS) 8 Unit(s) SubCutaneous at bedtime  insulin lispro (HumaLOG) corrective regimen sliding scale   SubCutaneous three times a day before meals  insulin lispro Injectable (HumaLOG) 2 Unit(s) SubCutaneous three times a day before meals  pyridoxine 50 milliGRAM(s) Oral daily    MEDICATIONS  (PRN):  acetaminophen   Tablet .. 650 milliGRAM(s) Oral every 6 hours PRN Moderate Pain (4 - 6)  dextrose 40% Gel 15 Gram(s) Oral once PRN Blood Glucose LESS THAN 70 milliGRAM(s)/deciliter  glucagon  Injectable 1 milliGRAM(s) IntraMuscular once PRN Glucose LESS THAN 70 milligrams/deciliter      Vital Signs Last 24 Hrs  T(C): 36.6 (20 Jul 2020 04:20), Max: 36.8 (19 Jul 2020 11:28)  T(F): 97.9 (20 Jul 2020 04:20), Max: 98.3 (19 Jul 2020 11:28)  HR: 76 (20 Jul 2020 04:20) (76 - 81)  BP: 106/61 (20 Jul 2020 04:20) (106/61 - 115/56)  BP(mean): --  RR: 18 (20 Jul 2020 04:20) (16 - 18)  SpO2: 96% (20 Jul 2020 04:20) (96% - 98%)    PHYSICAL EXAM:  GENERAL: NAD  deferred    LABS:              CAPILLARY BLOOD GLUCOSE      POCT Blood Glucose.: 73 mg/dL (20 Jul 2020 07:47)  POCT Blood Glucose.: 269 mg/dL (19 Jul 2020 21:12)  POCT Blood Glucose.: 84 mg/dL (19 Jul 2020 16:22)  POCT Blood Glucose.: 288 mg/dL (19 Jul 2020 11:16)    Lipid panel:               RADIOLOGY & ADDITIONAL TESTS:

## 2020-07-20 NOTE — PROGRESS NOTE ADULT - SUBJECTIVE AND OBJECTIVE BOX
INTERVAL HPI:  60 yr male, reports being  diagnosed of latent TB(not clear how) and started on Rifampin and Isoniazid 3 months ago while in [facility]. Reports had cough and fever in April.  [place]ives in [place], went to [facility] in January in January but could not return due to COVID.  Came to [place] 2 weeks ago,  daughter noticed 20 pound  wt loss, weakness, poor appetite, constantly thirsty, increased urination. They attributed it to the TB medications Took  him to  PMD, who recommended that he go to ED, passed out-( slumped over while sitting in a chair ) and did not respond for couple of minutes.  In ED noted severe hyperglycemia(new diagnosis of DM) but not in DKA. IVF and Insulin given. (08 Jul 2020 16:55).  AS above had cough and fever in April which improved after getting started on INH+ Rifampin.   Denied fever, chills,  cough , sputum production or night sweats.    OVERNIGHT EVENTS:  Awake and comfortable.    Vital Signs Last 24 Hrs  T(C): 36.7 (20 Jul 2020 14:13), Max: 36.8 (19 Jul 2020 17:00)  T(F): 98 (20 Jul 2020 14:13), Max: 98.3 (20 Jul 2020 00:30)  HR: 87 (20 Jul 2020 14:13) (76 - 87)  BP: 123/53 (20 Jul 2020 14:13) (106/61 - 123/53)  BP(mean): --  RR: 17 (20 Jul 2020 14:13) (17 - 18)  SpO2: 98% (20 Jul 2020 14:13) (96% - 98%)    PHYSICAL EXAM:  GEN:         Awake, responsive and comfortable.  HEENT:    Normal.    RESP:        no distress  CVS:             Regular rate and rhythm.   ABD:         Soft, non-tender, non-distended;     MEDICATIONS  (STANDING):  dextrose 5%. 1000 milliLiter(s) (50 mL/Hr) IV Continuous <Continuous>  dextrose 50% Injectable 12.5 Gram(s) IV Push once  dextrose 50% Injectable 25 Gram(s) IV Push once  dextrose 50% Injectable 25 Gram(s) IV Push once  enoxaparin Injectable 40 milliGRAM(s) SubCutaneous daily  insulin glargine Injectable (LANTUS) 8 Unit(s) SubCutaneous at bedtime  insulin lispro (HumaLOG) corrective regimen sliding scale   SubCutaneous three times a day before meals  insulin lispro Injectable (HumaLOG) 2 Unit(s) SubCutaneous three times a day before meals  pyridoxine 50 milliGRAM(s) Oral daily    MEDICATIONS  (PRN):  acetaminophen   Tablet .. 650 milliGRAM(s) Oral every 6 hours PRN Moderate Pain (4 - 6)  dextrose 40% Gel 15 Gram(s) Oral once PRN Blood Glucose LESS THAN 70 milliGRAM(s)/deciliter  glucagon  Injectable 1 milliGRAM(s) IntraMuscular once PRN Glucose LESS THAN 70 milligrams/deciliter     ASSESSMENT AND PLAN:  ·	RUL opacities .  ·	AFB growing in one of  broth culture  ·	Positive QuantiFeron Gold and weight loss.  ·	Newly diagnosed DM  ·	Hyperglycemia.  ·	New DM  ·	Dehydration.    AFB is growing in one of broth cultures of induced sputum.  Will follow identification and sensitivities.

## 2020-07-21 LAB
GLUCOSE BLDC GLUCOMTR-MCNC: 126 MG/DL — HIGH (ref 70–99)
GLUCOSE BLDC GLUCOMTR-MCNC: 205 MG/DL — HIGH (ref 70–99)
GLUCOSE BLDC GLUCOMTR-MCNC: 336 MG/DL — HIGH (ref 70–99)
GLUCOSE BLDC GLUCOMTR-MCNC: 85 MG/DL — SIGNIFICANT CHANGE UP (ref 70–99)

## 2020-07-21 PROCEDURE — 99232 SBSQ HOSP IP/OBS MODERATE 35: CPT

## 2020-07-21 RX ORDER — INSULIN LISPRO 100/ML
VIAL (ML) SUBCUTANEOUS
Refills: 0 | Status: DISCONTINUED | OUTPATIENT
Start: 2020-07-21 | End: 2020-07-31

## 2020-07-21 RX ORDER — INSULIN GLARGINE 100 [IU]/ML
6 INJECTION, SOLUTION SUBCUTANEOUS AT BEDTIME
Refills: 0 | Status: DISCONTINUED | OUTPATIENT
Start: 2020-07-21 | End: 2020-07-31

## 2020-07-21 RX ADMIN — INSULIN GLARGINE 6 UNIT(S): 100 INJECTION, SOLUTION SUBCUTANEOUS at 21:30

## 2020-07-21 RX ADMIN — Medication 2 UNIT(S): at 12:01

## 2020-07-21 RX ADMIN — Medication 2 UNIT(S): at 17:23

## 2020-07-21 RX ADMIN — Medication 3: at 12:01

## 2020-07-21 NOTE — PROGRESS NOTE ADULT - SUBJECTIVE AND OBJECTIVE BOX
INTERVAL HPI:  60 yr male, reports being  diagnosed of latent TB(not clear how) and started on Rifampin and Isoniazid 3 months ago while in [facility]. Reports had cough and fever in April.  [place], went to [facility] in January in January but could not return due to COVID.  Came to [place] 2 weeks ago,  daughter noticed 20 pound  wt loss, weakness, poor appetite, constantly thirsty, increased urination. They attributed it to the TB medications Took  him to  PMD, who recommended that he go to ED, passed out-( slumped over while sitting in a chair ) and did not respond for couple of minutes.  In ED noted severe hyperglycemia(new diagnosis of DM) but not in DKA. IVF and Insulin given. (08 Jul 2020 16:55).  AS above had cough and fever in April which improved after getting started on INH+ Rifampin.   Denied fever, chills,  cough , sputum production or night sweats.    OVERNIGHT EVENTS:  Comfortable    Vital Signs Last 24 Hrs  T(C): 36.2 (21 Jul 2020 05:02), Max: 37 (21 Jul 2020 00:01)  T(F): 97.2 (21 Jul 2020 05:02), Max: 98.6 (21 Jul 2020 00:01)  HR: 68 (21 Jul 2020 05:02) (68 - 87)  BP: 106/59 (21 Jul 2020 05:02) (100/62 - 123/53)  BP(mean): --  RR: 17 (21 Jul 2020 05:02) (17 - 18)  SpO2: 99% (21 Jul 2020 05:02) (97% - 99%)    PHYSICAL EXAM:  GEN:         Awake, responsive and comfortable.  HEENT:    Normal.    RESP:       no distress  CVS:           Regular rate and rhythm.   ABD:         Soft, non-tender, non-distended;     MEDICATIONS  (STANDING):  dextrose 5%. 1000 milliLiter(s) (50 mL/Hr) IV Continuous <Continuous>  dextrose 50% Injectable 12.5 Gram(s) IV Push once  dextrose 50% Injectable 25 Gram(s) IV Push once  dextrose 50% Injectable 25 Gram(s) IV Push once  enoxaparin Injectable 40 milliGRAM(s) SubCutaneous daily  insulin glargine Injectable (LANTUS) 6 Unit(s) SubCutaneous at bedtime  insulin lispro (HumaLOG) corrective regimen sliding scale   SubCutaneous three times a day before meals  insulin lispro Injectable (HumaLOG) 2 Unit(s) SubCutaneous three times a day before meals  pyridoxine 50 milliGRAM(s) Oral daily    MEDICATIONS  (PRN):  acetaminophen   Tablet .. 650 milliGRAM(s) Oral every 6 hours PRN Moderate Pain (4 - 6)  dextrose 40% Gel 15 Gram(s) Oral once PRN Blood Glucose LESS THAN 70 milliGRAM(s)/deciliter  glucagon  Injectable 1 milliGRAM(s) IntraMuscular once PRN Glucose LESS THAN 70 milligrams/deciliter     ASSESSMENT AND PLAN:  ·	RUL opacities .  ·	AFB growing in one of  broth culture  ·	Positive QuantiFeron Gold and weight loss.  ·	Newly diagnosed DM  ·	Hyperglycemia.  ·	New DM  ·	Dehydration.    AFB is growing in one of broth cultures of induced sputum.  ID follow up.

## 2020-07-21 NOTE — PROGRESS NOTE ADULT - ASSESSMENT
weight loss and cough with + QF gold but esr normal???  concerning as afb was done while on tx for latent TB deceasing its yield     Pulmonology following possible bronchoscopy    HIV: NR  DANIELLE, ANCA: Negative    7/9 AFB: Growth AFB detected  7/10 AFB: negative so far  7/11 AFB: negative so far    For bronchoscopy  -Continue Isolation Precautions  -Micro lab was called today and is not MTB or ARELY by DNA probe, still pending identification.  JOHANNE was informed about the result.

## 2020-07-21 NOTE — PROGRESS NOTE ADULT - SUBJECTIVE AND OBJECTIVE BOX
CHIEF COMPLAINT:   Blood sugars labile  no fever  no diarrhea  no chest pain  no shortness of breath at rest.       PHYSICAL EXAM:    GENERAL: Thinly built. no Acute distress  CHEST/LUNG: Clear to ausculation bilaterally, no wheezing, no crackles   HEART: Regular rate and rhythm; No murmurs, rubs  ABDOMEN: Soft, Nontender, Nondistended; Bowel sounds present  EXTREMITIES:  Moving all four extremities spontaneously, No clubbing, cyanosis, or edema  NERVOUS SYSTEM:  Grossly non focal.  Psychiatry: AAO x 3, mood is appropriate       OBJECTIVE DATA:     Vital Signs Last 24 Hrs  T(C): 36.2 (21 Jul 2020 05:02), Max: 37 (21 Jul 2020 00:01)  T(F): 97.2 (21 Jul 2020 05:02), Max: 98.6 (21 Jul 2020 00:01)  HR: 68 (21 Jul 2020 05:02) (68 - 87)  BP: 106/59 (21 Jul 2020 05:02) (100/62 - 123/53)  BP(mean): --  RR: 17 (21 Jul 2020 05:02) (17 - 18)  SpO2: 99% (21 Jul 2020 05:02) (97% - 99%)           Daily     Daily   	    CAPILLARY BLOOD GLUCOSE      POCT Blood Glucose.: 205 mg/dL (21 Jul 2020 11:44)      MEDICATIONS  (STANDING):  dextrose 5%. 1000 milliLiter(s) (50 mL/Hr) IV Continuous <Continuous>  dextrose 50% Injectable 12.5 Gram(s) IV Push once  dextrose 50% Injectable 25 Gram(s) IV Push once  dextrose 50% Injectable 25 Gram(s) IV Push once  enoxaparin Injectable 40 milliGRAM(s) SubCutaneous daily  insulin glargine Injectable (LANTUS) 6 Unit(s) SubCutaneous at bedtime  insulin lispro (HumaLOG) corrective regimen sliding scale   SubCutaneous three times a day before meals  insulin lispro Injectable (HumaLOG) 2 Unit(s) SubCutaneous three times a day before meals  pyridoxine 50 milliGRAM(s) Oral daily    MEDICATIONS  (PRN):  acetaminophen   Tablet .. 650 milliGRAM(s) Oral every 6 hours PRN Moderate Pain (4 - 6)  dextrose 40% Gel 15 Gram(s) Oral once PRN Blood Glucose LESS THAN 70 milliGRAM(s)/deciliter  glucagon  Injectable 1 milliGRAM(s) IntraMuscular once PRN Glucose LESS THAN 70 milligrams/deciliter

## 2020-07-21 NOTE — PROGRESS NOTE ADULT - ASSESSMENT
60 yr old with Latent TB from [facility] with wt loss, constant thirst, poor appetite, weakness and increased urination .    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   - Positive QuantiFeron, Neg HIV  - Discussed with Dr Daniel and Dr Roper>>> follow PCR identification of positive broth culture. If it is TB, then likely no need for bronchoscopy and will need JOHANNE involvement for initiating and finishing TB treatment.       Syncope:  - Likely vasovagal   - In the setting of dehydration uncontrolled DM, viral illness  - PE ruled out  - CT head neg  - 2D echo with preserved EF, no acute path    DM type 2.  Blood sugars fluctuating. (low normal and elevated blood sugars.   cont current decreased insulin regimen. Follow finger sticks.   - DKA ruled out  - Hb A1c 10.6  Endo on board.     Pseudohyponatremia:  - Corrected Na ok      Severe protein-calorie malnutrition:  - Nutritional supplements.       MERCEDES:  - Resolved  - B/L non obstructing stone, outpt follow up     Headache. Cont prn tylenol.     Blurred vision. Needs outpatient Ophthalmologist follow up.    DVT ppx

## 2020-07-21 NOTE — PROGRESS NOTE ADULT - SUBJECTIVE AND OBJECTIVE BOX
Patient is a 60y old  Male who presents with a chief complaint of syncope (20 Jul 2020 12:31)      INTERVAL HPI/OVERNIGHT EVENTS:  pt with no complaints  eating well, good appetite    MEDICATIONS  (STANDING):  dextrose 5%. 1000 milliLiter(s) (50 mL/Hr) IV Continuous <Continuous>  dextrose 50% Injectable 12.5 Gram(s) IV Push once  dextrose 50% Injectable 25 Gram(s) IV Push once  dextrose 50% Injectable 25 Gram(s) IV Push once  enoxaparin Injectable 40 milliGRAM(s) SubCutaneous daily  insulin glargine Injectable (LANTUS) 6 Unit(s) SubCutaneous at bedtime  insulin lispro (HumaLOG) corrective regimen sliding scale   SubCutaneous three times a day before meals  insulin lispro Injectable (HumaLOG) 2 Unit(s) SubCutaneous three times a day before meals  pyridoxine 50 milliGRAM(s) Oral daily    MEDICATIONS  (PRN):  acetaminophen   Tablet .. 650 milliGRAM(s) Oral every 6 hours PRN Moderate Pain (4 - 6)  dextrose 40% Gel 15 Gram(s) Oral once PRN Blood Glucose LESS THAN 70 milliGRAM(s)/deciliter  glucagon  Injectable 1 milliGRAM(s) IntraMuscular once PRN Glucose LESS THAN 70 milligrams/deciliter      REVIEW OF SYSTEMS:  CONSTITUTIONAL: No fever, weight loss, or fatigue  RESPIRATORY: No cough, wheezing, chills or hemoptysis; No shortness of breath  CARDIOVASCULAR: No chest pain, palpitations, dizziness, or leg swelling  GASTROINTESTINAL: No abdominal or epigastric pain. No nausea, vomiting, or hematemesis; No diarrhea or constipation. No melena or hematochezia.  ENDOCRINE: No heat or cold intolerance; No hair loss      Vital Signs Last 24 Hrs  T(C): 36.2 (21 Jul 2020 05:02), Max: 37 (21 Jul 2020 00:01)  T(F): 97.2 (21 Jul 2020 05:02), Max: 98.6 (21 Jul 2020 00:01)  HR: 68 (21 Jul 2020 05:02) (68 - 87)  BP: 106/59 (21 Jul 2020 05:02) (100/62 - 123/53)  BP(mean): --  RR: 17 (21 Jul 2020 05:02) (17 - 18)  SpO2: 99% (21 Jul 2020 05:02) (97% - 99%)    PHYSICAL EXAM:  GENERAL: NAD, well-groomed, well-developed        LABS:              CAPILLARY BLOOD GLUCOSE      POCT Blood Glucose.: 85 mg/dL (21 Jul 2020 07:59)  POCT Blood Glucose.: 213 mg/dL (20 Jul 2020 21:55)  POCT Blood Glucose.: 125 mg/dL (20 Jul 2020 16:53)  POCT Blood Glucose.: 283 mg/dL (20 Jul 2020 12:13)    Lipid panel:               RADIOLOGY & ADDITIONAL TESTS:

## 2020-07-22 LAB
GLUCOSE BLDC GLUCOMTR-MCNC: 109 MG/DL — HIGH (ref 70–99)
GLUCOSE BLDC GLUCOMTR-MCNC: 129 MG/DL — HIGH (ref 70–99)
GLUCOSE BLDC GLUCOMTR-MCNC: 150 MG/DL — HIGH (ref 70–99)
GLUCOSE BLDC GLUCOMTR-MCNC: 186 MG/DL — HIGH (ref 70–99)

## 2020-07-22 PROCEDURE — 99232 SBSQ HOSP IP/OBS MODERATE 35: CPT

## 2020-07-22 RX ADMIN — Medication 2 UNIT(S): at 16:24

## 2020-07-22 RX ADMIN — Medication 2 UNIT(S): at 08:20

## 2020-07-22 RX ADMIN — INSULIN GLARGINE 6 UNIT(S): 100 INJECTION, SOLUTION SUBCUTANEOUS at 21:51

## 2020-07-22 RX ADMIN — Medication 2 UNIT(S): at 12:44

## 2020-07-22 NOTE — PROGRESS NOTE ADULT - SUBJECTIVE AND OBJECTIVE BOX
Patient is a 60y old  Male who presents with a chief complaint of syncope (22 Jul 2020 11:44)      Interval History: finger sticks are with fluctuations but better and mainly in 100's   on  Lantus 6 units and prandial lispro 2 units   better PO intake     MEDICATIONS  (STANDING):  dextrose 5%. 1000 milliLiter(s) (50 mL/Hr) IV Continuous <Continuous>  dextrose 50% Injectable 12.5 Gram(s) IV Push once  dextrose 50% Injectable 25 Gram(s) IV Push once  dextrose 50% Injectable 25 Gram(s) IV Push once  enoxaparin Injectable 40 milliGRAM(s) SubCutaneous daily  insulin glargine Injectable (LANTUS) 6 Unit(s) SubCutaneous at bedtime  insulin lispro (HumaLOG) corrective regimen sliding scale   SubCutaneous three times a day before meals  insulin lispro Injectable (HumaLOG) 2 Unit(s) SubCutaneous three times a day before meals  pyridoxine 50 milliGRAM(s) Oral daily    MEDICATIONS  (PRN):  acetaminophen   Tablet .. 650 milliGRAM(s) Oral every 6 hours PRN Moderate Pain (4 - 6)  dextrose 40% Gel 15 Gram(s) Oral once PRN Blood Glucose LESS THAN 70 milliGRAM(s)/deciliter  glucagon  Injectable 1 milliGRAM(s) IntraMuscular once PRN Glucose LESS THAN 70 milligrams/deciliter      Allergies    No Known Allergies    Intolerances        REVIEW OF SYSTEMS:  CONSTITUTIONAL: no changes    Vital Signs Last 24 Hrs  T(C): 36.7 (22 Jul 2020 12:59), Max: 36.8 (21 Jul 2020 17:00)  T(F): 98 (22 Jul 2020 12:59), Max: 98.2 (21 Jul 2020 17:00)  HR: 89 (22 Jul 2020 12:59) (75 - 89)  BP: 139/84 (22 Jul 2020 12:59) (109/62 - 139/84)  BP(mean): --  RR: 16 (22 Jul 2020 12:59) (16 - 18)  SpO2: 99% (22 Jul 2020 12:59) (98% - 100%)    PHYSICAL EXAM:  GENERAL: as per the progress notes of Primary Team     LABS:        CAPILLARY BLOOD GLUCOSE      POCT Blood Glucose.: 129 mg/dL (22 Jul 2020 11:19)  POCT Blood Glucose.: 109 mg/dL (22 Jul 2020 07:39)  POCT Blood Glucose.: 336 mg/dL (21 Jul 2020 21:24)  POCT Blood Glucose.: 126 mg/dL (21 Jul 2020 16:41)    Lipid panel:           Thyroid:  Diabetes Tests:  Parathyroid Panel:  Adrenals:  RADIOLOGY & ADDITIONAL TESTS:    Imaging Personally Reviewed:  [ ] YES  [ ] NO    Consultant(s) Notes Reviewed:  [ ] YES  [ ] NO    Care Discussed with Consultants/Other Providers [ ] YES  [ ] NO

## 2020-07-22 NOTE — PROGRESS NOTE ADULT - SUBJECTIVE AND OBJECTIVE BOX
INTERVAL HPI:   60 yr male, reports being  diagnosed of latent TB(not clear how) and started on Rifampin and Isoniazid 3 months ago while in [facility]. Reports had cough and fever in April.  [place], went to [facility] in January in January but could not return due to COVID.  Came to [place] 2 weeks ago,  daughter noticed 20 pound  wt loss, weakness, poor appetite, constantly thirsty, increased urination. They attributed it to the TB medications Took  him to  PMD, who recommended that he go to ED, passed out-( slumped over while sitting in a chair ) and did not respond for couple of minutes.  In ED noted severe hyperglycemia(new diagnosis of DM) but not in DKA. IVF and Insulin given. (08 Jul 2020 16:55).  AS above had cough and fever in April which improved after getting started on INH+ Rifampin.   Denied fever, chills,  cough , sputum production or night sweats.    OVERNIGHT EVENTS:  Awake, comfortable    Vital Signs Last 24 Hrs  T(C): 36.7 (22 Jul 2020 12:59), Max: 36.8 (21 Jul 2020 17:00)  T(F): 98 (22 Jul 2020 12:59), Max: 98.2 (21 Jul 2020 17:00)  HR: 89 (22 Jul 2020 12:59) (75 - 89)  BP: 139/84 (22 Jul 2020 12:59) (109/62 - 139/84)  BP(mean): --  RR: 16 (22 Jul 2020 12:59) (16 - 18)  SpO2: 99% (22 Jul 2020 12:59) (98% - 100%)    PHYSICAL EXAM:  GEN:         Awake, responsive and comfortable.  HEENT:    Normal.    RESP:       no distress  CVS:          Regular rate and rhythm.   ABD:         Soft, non-tender, non-distended;     MEDICATIONS  (STANDING):  dextrose 5%. 1000 milliLiter(s) (50 mL/Hr) IV Continuous <Continuous>  dextrose 50% Injectable 12.5 Gram(s) IV Push once  dextrose 50% Injectable 25 Gram(s) IV Push once  dextrose 50% Injectable 25 Gram(s) IV Push once  enoxaparin Injectable 40 milliGRAM(s) SubCutaneous daily  insulin glargine Injectable (LANTUS) 6 Unit(s) SubCutaneous at bedtime  insulin lispro (HumaLOG) corrective regimen sliding scale   SubCutaneous three times a day before meals  insulin lispro Injectable (HumaLOG) 2 Unit(s) SubCutaneous three times a day before meals  pyridoxine 50 milliGRAM(s) Oral daily    MEDICATIONS  (PRN):  acetaminophen   Tablet .. 650 milliGRAM(s) Oral every 6 hours PRN Moderate Pain (4 - 6)  dextrose 40% Gel 15 Gram(s) Oral once PRN Blood Glucose LESS THAN 70 milliGRAM(s)/deciliter  glucagon  Injectable 1 milliGRAM(s) IntraMuscular once PRN Glucose LESS THAN 70 milligrams/deciliter     ASSESSMENT AND PLAN:  ·	RUL opacities .  ·	AFB growing in one of  broth culture  ·	Positive QuantiFeron Gold and weight loss.  ·	Newly diagnosed DM  ·	Hyperglycemia.  ·	New DM  ·	Dehydration.    Awaiting identification for AFB.

## 2020-07-22 NOTE — PROGRESS NOTE ADULT - ASSESSMENT
60 yr old with Latent TB from [facility] with wt loss, constant thirst, poor appetite, weakness and increased urination .    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   - Positive QuantiFeron, Neg HIV  - Discussed with Dr Daniel and Dr Roper>>> Broth culture identification not MTB or ARELY. will need bronchoscopy. Contacted Dr. Flores. NPO after MN for bronch tomorrow.       Syncope:  - Likely vasovagal   - In the setting of dehydration uncontrolled DM, viral illness  - PE ruled out  - CT head neg  - 2D echo with preserved EF, no acute path    DM type 2.  Blood sugars fluctuating. (low normal and elevated blood sugars.   cont current decreased insulin regimen. Follow finger sticks.   - DKA ruled out  - Hb A1c 10.6  Endo on board.     Pseudohyponatremia:  - Corrected Na ok      Severe protein-calorie malnutrition:  - Nutritional supplements.       MERCEDES:  - Resolved  - B/L non obstructing stone, outpt follow up     Headache. Cont prn tylenol.     Blurred vision. Needs outpatient Ophthalmologist follow up.    DVT ppx

## 2020-07-22 NOTE — PROGRESS NOTE ADULT - SUBJECTIVE AND OBJECTIVE BOX
HPI:  60 yr old diagnosed of latent TB and started on Rifampin and Isoniazid 3 months ago while in [facility]. He came to [place] 2 weeks ago and the dgtr has noticed 20 pds wt loss, weakness, poor appetite, constantly thirsty, increased urination. They attributed it to the TB medications SE. They did take him to their PMD, who recommended that he go to ED. However patient passed out- slumped over while sitting in a chair and did not respond for couple of minutes. Upon shaking after he came awake and said he was fine. Dgtr brought him to ED with concerns.     In ED noted severe hyperglycemia and not in DKA. IVF and Insulin given. (08 Jul 2020 16:55)      Allergies    No Known Allergies    Intolerances        MEDICATIONS  (STANDING):  dextrose 5%. 1000 milliLiter(s) (50 mL/Hr) IV Continuous <Continuous>  dextrose 50% Injectable 12.5 Gram(s) IV Push once  dextrose 50% Injectable 25 Gram(s) IV Push once  dextrose 50% Injectable 25 Gram(s) IV Push once  enoxaparin Injectable 40 milliGRAM(s) SubCutaneous daily  insulin glargine Injectable (LANTUS) 6 Unit(s) SubCutaneous at bedtime  insulin lispro (HumaLOG) corrective regimen sliding scale   SubCutaneous three times a day before meals  insulin lispro Injectable (HumaLOG) 2 Unit(s) SubCutaneous three times a day before meals  pyridoxine 50 milliGRAM(s) Oral daily    MEDICATIONS  (PRN):  acetaminophen   Tablet .. 650 milliGRAM(s) Oral every 6 hours PRN Moderate Pain (4 - 6)  dextrose 40% Gel 15 Gram(s) Oral once PRN Blood Glucose LESS THAN 70 milliGRAM(s)/deciliter  glucagon  Injectable 1 milliGRAM(s) IntraMuscular once PRN Glucose LESS THAN 70 milligrams/deciliter      REVIEW OF SYSTEMS:    CONSTITUTIONAL: No fever, chills, weight loss, or fatigue  HEENT: No sore throat, runny nose, ear ache  RESPIRATORY: No cough, wheezing, No shortness of breath  CARDIOVASCULAR: No chest pain, palpitations, dizziness  GASTROINTESTINAL: No abdominal pain. No nausea, vomiting, diarrhea  GENITOURINARY: No dysuria, increase frequency, hematuria, or incontinence  NEUROLOGICAL: No headaches, memory loss, loss of strength, numbness, or tremors, no weakness  EXTREMITY: No pedal edema BLE  SKIN: No itching, burning, rashes, or lesions     VITAL SIGNS:  T(C): 36.7 (07-22-20 @ 05:47), Max: 37.1 (07-21-20 @ 13:25)  T(F): 98.1 (07-22-20 @ 05:47), Max: 98.8 (07-21-20 @ 13:25)  HR: 75 (07-22-20 @ 05:47) (60 - 85)  BP: 109/62 (07-22-20 @ 05:47) (109/62 - 115/65)  RR: 17 (07-22-20 @ 05:47) (16 - 18)  SpO2: 100% (07-22-20 @ 05:47) (98% - 100%)  Wt(kg): --    PHYSICAL EXAM:    PHYSICAL EXAM:    GENERAL: Alert, oriented x3, NAD, cachectic  HEENT: SKYLER, EOMI, MOM  CHEST/LUNG: Clear to ausculation B/L  HEART: RRR, s1/s2 present  ABDOMEN: BS present, soft, depressible, no tender  EXTREMITIES: No edema    LABS:     Radiology:

## 2020-07-22 NOTE — PROGRESS NOTE ADULT - ASSESSMENT
weight loss and cough with + QF gold but esr normal???  concerning as afb was done while on tx for latent TB deceasing its yield     Pulmonology following possible bronchoscopy    HIV: NR  DANIELLE, ANCA: Negative    7/9 AFB: Growth AFB detected  not MTB or ARELY by DNA probe, still pending identification.    7/10 AFB: negative so far  7/11 AFB: negative so far    -For bronchoscopy  -Continue Isolation Precautions  -Micro lab was called yesterday and is not MTB or ARELY by DNA probe, still pending identification.  JOHANNE was informed about the result.    discuss with pt via   all questions and concerns were addressed to their satisfaction

## 2020-07-22 NOTE — PROGRESS NOTE ADULT - SUBJECTIVE AND OBJECTIVE BOX
CHIEF COMPLAINT:   No overnight events.   no fever  no diarrhea  no chest pain  no shortness of breath at rest.       PHYSICAL EXAM:    GENERAL: Thinly built. no Acute distress  CHEST/LUNG: Clear to ausculation bilaterally, no wheezing, no crackles   HEART: Regular rate and rhythm; No murmurs, rubs  ABDOMEN: Soft, Nontender, Nondistended; Bowel sounds present  EXTREMITIES:  Moving all four extremities spontaneously, No clubbing, cyanosis, or edema  NERVOUS SYSTEM:  Grossly non focal.  Psychiatry: AAO x 3, mood is appropriate       OBJECTIVE DATA:     Vital Signs Last 24 Hrs  T(C): 36.7 (22 Jul 2020 05:47), Max: 37.1 (21 Jul 2020 13:25)  T(F): 98.1 (22 Jul 2020 05:47), Max: 98.8 (21 Jul 2020 13:25)  HR: 75 (22 Jul 2020 05:47) (60 - 85)  BP: 109/62 (22 Jul 2020 05:47) (109/62 - 115/65)  BP(mean): --  RR: 17 (22 Jul 2020 05:47) (16 - 18)  SpO2: 100% (22 Jul 2020 05:47) (98% - 100%)           Daily     Daily   	     CAPILLARY BLOOD GLUCOSE      POCT Blood Glucose.: 129 mg/dL (22 Jul 2020 11:19)        MEDICATIONS  (STANDING):  dextrose 5%. 1000 milliLiter(s) (50 mL/Hr) IV Continuous <Continuous>  dextrose 50% Injectable 12.5 Gram(s) IV Push once  dextrose 50% Injectable 25 Gram(s) IV Push once  dextrose 50% Injectable 25 Gram(s) IV Push once  enoxaparin Injectable 40 milliGRAM(s) SubCutaneous daily  insulin glargine Injectable (LANTUS) 6 Unit(s) SubCutaneous at bedtime  insulin lispro (HumaLOG) corrective regimen sliding scale   SubCutaneous three times a day before meals  insulin lispro Injectable (HumaLOG) 2 Unit(s) SubCutaneous three times a day before meals  pyridoxine 50 milliGRAM(s) Oral daily    MEDICATIONS  (PRN):  acetaminophen   Tablet .. 650 milliGRAM(s) Oral every 6 hours PRN Moderate Pain (4 - 6)  dextrose 40% Gel 15 Gram(s) Oral once PRN Blood Glucose LESS THAN 70 milliGRAM(s)/deciliter  glucagon  Injectable 1 milliGRAM(s) IntraMuscular once PRN Glucose LESS THAN 70 milligrams/deciliter

## 2020-07-22 NOTE — CHART NOTE - NSCHARTNOTEFT_GEN_A_CORE
Pt admitted s/p syncope event; c latent TB, recent wt loss, awaiting bronchoscopy (scheduled for 7/23); found c newly dx DM.  Spoke to pt via video , Nika #[id number]. Educated on DM diet recommendations; educational material in English sent home to daughter for future reference.  Reviewed macronutrients, CHO foods & beverages, importance of CHO portion control, heart health, benefits of whole grains vs refined CHO. Appropriate questions asked & answered; pt engaged during discussion & receptive to education.  Per endo note pt to be discharged on Metformin & Januvia.     Factors impacting intake: [X ] none [ ] nausea  [ ] vomiting [ ] diarrhea [ ] constipation  [ ]chewing problems [ ] swallowing issues  [ ] other:     Diet Prescription: Diet, Consistent Carbohydrate w/Evening Snack:   Supplement Feeding Modality:  Oral  Glucerna Shake Cans or Servings Per Day:  1       Frequency:  Three Times a day (07-09-20 @ 11:35)  Diet, NPO after Midnight:      NPO Start Date: 22-Jul-2020,   NPO Start Time: 23:59 (07-22-20 @ 11:52)    Intake:   Pt eating well; % most meals; drinking supplements    Current Weight:   50.7 kg (7/22) admission wt 51.8 kg (7/9)  % Weight Change: 2% wt loss x 13 days    Nutrition focused physical exam conducted:    Subcutaneous fat loss: [WDL ] Orbital fat pads region, [WDL ]Buccal fat region, [moderate ]Triceps region,  [ severe]Ribs region.    Muscle wasting: [severe ]Temples region, [severe ]Clavicle region, [severe ]Shoulder region, [moderate ]Scapula region, [mild ]Interosseous region,  [moderate ]thigh region, [moderate ]Calf region    Physical Appearance:  no edema noted    Pertinent Medications: MEDICATIONS  (STANDING):  dextrose 5%. 1000 milliLiter(s) (50 mL/Hr) IV Continuous <Continuous>  dextrose 50% Injectable 12.5 Gram(s) IV Push once  dextrose 50% Injectable 25 Gram(s) IV Push once  dextrose 50% Injectable 25 Gram(s) IV Push once  enoxaparin Injectable 40 milliGRAM(s) SubCutaneous daily  insulin glargine Injectable (LANTUS) 6 Unit(s) SubCutaneous at bedtime  insulin lispro (HumaLOG) corrective regimen sliding scale   SubCutaneous three times a day before meals  insulin lispro Injectable (HumaLOG) 2 Unit(s) SubCutaneous three times a day before meals  pyridoxine 50 milliGRAM(s) Oral daily    MEDICATIONS  (PRN):  acetaminophen   Tablet .. 650 milliGRAM(s) Oral every 6 hours PRN Moderate Pain (4 - 6)  dextrose 40% Gel 15 Gram(s) Oral once PRN Blood Glucose LESS THAN 70 milliGRAM(s)/deciliter  glucagon  Injectable 1 milliGRAM(s) IntraMuscular once PRN Glucose LESS THAN 70 milligrams/deciliter    Pertinent Labs:    07-07-20 @ 10:43  A1C 10.6, average 258; last nutrition-related labs on 07-14 WDL; 07-21 POCT: 85, 205, 126, 336    Skin:  WDL    Estimated Needs:   [X ] no change since previous assessment (7/9)  [ ] recalculated:     Previous Nutrition Diagnosis:   [ x] Unintended wt loss  Etiology:  uncontrolled DM & TB  Signs & Symptoms:  4.2% wt loss x 1 week; 12.8% x 3 months    Nutrition Diagnosis is [ ] ongoing  [ ] resolved [ X] not applicable     New Nutrition Diagnosis:   [X ] Severe malnutrition - acute    Related to (etiology): Inadequate energy/protein intake related to uncontrolled DM, TB    As evidenced by (signs & symptoms):  13% wt loss x 3 months; moderate/severe fat depletion & severe muscle wasting as noted    Goal/Expected Outcome:  Pt to consume > 75% meals/supplements; promote wt gain of 1#/week during hospitalization    Interventions:    continue current diet rx as noted  Recommend   [ ] Change Diet To:  [ ] Nutrition Supplement  [ ] Nutrition Support  [ ] Other:     Monitoring and Evaluation:   [X ] PO intake [ x ] Tolerance to diet prescription [ x ] weights [ x ] labs[ x ] follow up per protocol  [ ] other:

## 2020-07-23 LAB
ANION GAP SERPL CALC-SCNC: 6 MMOL/L — SIGNIFICANT CHANGE UP (ref 5–17)
APTT BLD: 34 SEC — SIGNIFICANT CHANGE UP (ref 27.5–35.5)
BLD GP AB SCN SERPL QL: SIGNIFICANT CHANGE UP
BUN SERPL-MCNC: 20 MG/DL — SIGNIFICANT CHANGE UP (ref 7–23)
CALCIUM SERPL-MCNC: 8.7 MG/DL — SIGNIFICANT CHANGE UP (ref 8.5–10.1)
CHLORIDE SERPL-SCNC: 103 MMOL/L — SIGNIFICANT CHANGE UP (ref 96–108)
CO2 SERPL-SCNC: 27 MMOL/L — SIGNIFICANT CHANGE UP (ref 22–31)
CREAT SERPL-MCNC: 0.91 MG/DL — SIGNIFICANT CHANGE UP (ref 0.5–1.3)
GLUCOSE BLDC GLUCOMTR-MCNC: 117 MG/DL — HIGH (ref 70–99)
GLUCOSE BLDC GLUCOMTR-MCNC: 131 MG/DL — HIGH (ref 70–99)
GLUCOSE BLDC GLUCOMTR-MCNC: 164 MG/DL — HIGH (ref 70–99)
GLUCOSE BLDC GLUCOMTR-MCNC: 174 MG/DL — HIGH (ref 70–99)
GLUCOSE BLDC GLUCOMTR-MCNC: 339 MG/DL — HIGH (ref 70–99)
GLUCOSE SERPL-MCNC: 153 MG/DL — HIGH (ref 70–99)
HCT VFR BLD CALC: 34.3 % — LOW (ref 39–50)
HGB BLD-MCNC: 11.8 G/DL — LOW (ref 13–17)
INR BLD: 1.09 RATIO — SIGNIFICANT CHANGE UP (ref 0.88–1.16)
MCHC RBC-ENTMCNC: 23.5 PG — LOW (ref 27–34)
MCHC RBC-ENTMCNC: 34.4 GM/DL — SIGNIFICANT CHANGE UP (ref 32–36)
MCV RBC AUTO: 68.3 FL — LOW (ref 80–100)
NRBC # BLD: 1 /100 WBCS — HIGH (ref 0–0)
PLATELET # BLD AUTO: 343 K/UL — SIGNIFICANT CHANGE UP (ref 150–400)
POTASSIUM SERPL-MCNC: 4.4 MMOL/L — SIGNIFICANT CHANGE UP (ref 3.5–5.3)
POTASSIUM SERPL-SCNC: 4.4 MMOL/L — SIGNIFICANT CHANGE UP (ref 3.5–5.3)
PROTHROM AB SERPL-ACNC: 12.1 SEC — SIGNIFICANT CHANGE UP (ref 10.6–13.6)
RBC # BLD: 5.02 M/UL — SIGNIFICANT CHANGE UP (ref 4.2–5.8)
RBC # FLD: 18.8 % — HIGH (ref 10.3–14.5)
SARS-COV-2 RNA SPEC QL NAA+PROBE: SIGNIFICANT CHANGE UP
SODIUM SERPL-SCNC: 136 MMOL/L — SIGNIFICANT CHANGE UP (ref 135–145)
WBC # BLD: 6.55 K/UL — SIGNIFICANT CHANGE UP (ref 3.8–10.5)
WBC # FLD AUTO: 6.55 K/UL — SIGNIFICANT CHANGE UP (ref 3.8–10.5)

## 2020-07-23 PROCEDURE — 99232 SBSQ HOSP IP/OBS MODERATE 35: CPT

## 2020-07-23 RX ADMIN — Medication 650 MILLIGRAM(S): at 21:54

## 2020-07-23 RX ADMIN — INSULIN GLARGINE 6 UNIT(S): 100 INJECTION, SOLUTION SUBCUTANEOUS at 22:38

## 2020-07-23 RX ADMIN — Medication 650 MILLIGRAM(S): at 19:51

## 2020-07-23 NOTE — PROGRESS NOTE ADULT - ASSESSMENT
weight loss and cough with + QF gold but esr normal???  concerning as afb was done while on tx for latent TB deceasing its yield   hiv test neg   neg  millie, anca   for bronchoscopy   clinically my beaseline   not toxic appearing  will be discussed with JOHANNE re discharge plan   afb culture grew in 12 days ; this is not mycobacterium tuberculosis alcides as grew in 4 days   await pcr for ID   mantain isolation weight loss and cough with + QF gold but esr normal???  concerning as afb was done while on tx for latent TB deceasing its yield   hiv test neg   neg  millie, anca   for bronchoscopy ; cancelled today   for am   not toxic appearing  will be discussed with JOHANNE re discharge plan   afb is not Mtb and not kirsten per micro  mantain isolation

## 2020-07-23 NOTE — PROGRESS NOTE ADULT - SUBJECTIVE AND OBJECTIVE BOX
INTERVAL HPI:  60 yr male, reports being  diagnosed of latent TB(not clear how) and started on Rifampin and Isoniazid 3 months ago while in [facility]. Reports had cough and fever in April.  [place], went to [facility] in January in January but could not return due to COVID.  Came to [place] 2 weeks ago,  daughter noticed 20 pound  wt loss, weakness, poor appetite, constantly thirsty, increased urination. They attributed it to the TB medications Took  him to  PMD, who recommended that he go to ED, passed out-( slumped over while sitting in a chair ) and did not respond for couple of minutes.  In ED noted severe hyperglycemia(new diagnosis of DM) but not in DKA. IVF and Insulin given. (08 Jul 2020 16:55).  AS above had cough and fever in April which improved after getting started on INH+ Rifampin.   Denied fever, chills,  cough , sputum production or night sweats.    OVERNIGHT EVENTS:  No new complains.    Vital Signs Last 24 Hrs  T(C): 37.3 (23 Jul 2020 06:10), Max: 37.3 (22 Jul 2020 16:58)  T(F): 99.2 (23 Jul 2020 06:10), Max: 99.2 (23 Jul 2020 06:10)  HR: 83 (23 Jul 2020 06:10) (75 - 89)  BP: 121/70 (23 Jul 2020 06:10) (101/54 - 139/84)  BP(mean): --  RR: 18 (23 Jul 2020 06:10) (16 - 18)  SpO2: 100% (23 Jul 2020 06:10) (99% - 100%)    PHYSICAL EXAM:  GEN:         Awake, responsive and comfortable.  HEENT:    Normal.    RESP:       no distress.  CVS:          Regular rate and rhythm.   ABD:         Soft, non-tender, non-distended;     MEDICATIONS  (STANDING):  dextrose 5%. 1000 milliLiter(s) (50 mL/Hr) IV Continuous <Continuous>  dextrose 50% Injectable 12.5 Gram(s) IV Push once  dextrose 50% Injectable 25 Gram(s) IV Push once  dextrose 50% Injectable 25 Gram(s) IV Push once  insulin glargine Injectable (LANTUS) 6 Unit(s) SubCutaneous at bedtime  insulin lispro (HumaLOG) corrective regimen sliding scale   SubCutaneous three times a day before meals  insulin lispro Injectable (HumaLOG) 2 Unit(s) SubCutaneous three times a day before meals  pyridoxine 50 milliGRAM(s) Oral daily    MEDICATIONS  (PRN):  acetaminophen   Tablet .. 650 milliGRAM(s) Oral every 6 hours PRN Moderate Pain (4 - 6)  dextrose 40% Gel 15 Gram(s) Oral once PRN Blood Glucose LESS THAN 70 milliGRAM(s)/deciliter  glucagon  Injectable 1 milliGRAM(s) IntraMuscular once PRN Glucose LESS THAN 70 milligrams/deciliter    LABS:                        11.8   6.55  )-----------( 343      ( 23 Jul 2020 04:13 )             34.3     07-23    136  |  103  |  20  ----------------------------<  153<H>  4.4   |  27  |  0.91    Ca    8.7      23 Jul 2020 04:13    PT/INR - ( 23 Jul 2020 04:13 )   PT: 12.1 sec;   INR: 1.09 ratio      PTT - ( 23 Jul 2020 04:13 )  PTT:34.0 sec     ASSESSMENT AND PLAN:  ·	RUL opacities .  ·	AFB growing in one of  broth culture  ·	Positive QuantiFeron Gold and weight loss.  ·	Newly diagnosed DM  ·	Hyperglycemia.  ·	New DM  ·	Dehydration.    Presumptive AFB reported on broth is not MTB or MAC.  For bronchoscopy.

## 2020-07-23 NOTE — PROGRESS NOTE ADULT - SUBJECTIVE AND OBJECTIVE BOX
CHIEF COMPLAINT:   No overnight events.   no fever  no diarrhea  no chest pain  no shortness of breath at rest.     NPO  after MN for bronch today       PHYSICAL EXAM:    GENERAL: Thinly built. no Acute distress  CHEST/LUNG: Clear to ausculation bilaterally, no wheezing, no crackles   HEART: Regular rate and rhythm; No murmurs, rubs  ABDOMEN: Soft, Nontender, Nondistended; Bowel sounds present  EXTREMITIES:  Moving all four extremities spontaneously, No clubbing, cyanosis, or edema  NERVOUS SYSTEM:  Grossly non focal.  Psychiatry: AAO x 3, mood is appropriate       OBJECTIVE DATA:     Vital Signs Last 24 Hrs  T(C): 37.3 (23 Jul 2020 06:10), Max: 37.3 (22 Jul 2020 16:58)  T(F): 99.2 (23 Jul 2020 06:10), Max: 99.2 (23 Jul 2020 06:10)  HR: 83 (23 Jul 2020 06:10) (75 - 89)  BP: 121/70 (23 Jul 2020 06:10) (101/54 - 139/84)  BP(mean): --  RR: 18 (23 Jul 2020 06:10) (16 - 18)  SpO2: 100% (23 Jul 2020 06:10) (99% - 100%)           Daily     Daily   LABS:                        11.8   6.55  )-----------( 343      ( 23 Jul 2020 04:13 )             34.3             07-23    136  |  103  |  20  ----------------------------<  153<H>  4.4   |  27  |  0.91    Ca    8.7      23 Jul 2020 04:13                PT/INR - ( 23 Jul 2020 04:13 )   PT: 12.1 sec;   INR: 1.09 ratio         PTT - ( 23 Jul 2020 04:13 )  PTT:34.0 sec         CAPILLARY BLOOD GLUCOSE      POCT Blood Glucose.: 164 mg/dL (23 Jul 2020 05:55)      MEDICATIONS  (STANDING):  dextrose 5%. 1000 milliLiter(s) (50 mL/Hr) IV Continuous <Continuous>  dextrose 50% Injectable 12.5 Gram(s) IV Push once  dextrose 50% Injectable 25 Gram(s) IV Push once  dextrose 50% Injectable 25 Gram(s) IV Push once  insulin glargine Injectable (LANTUS) 6 Unit(s) SubCutaneous at bedtime  insulin lispro (HumaLOG) corrective regimen sliding scale   SubCutaneous three times a day before meals  insulin lispro Injectable (HumaLOG) 2 Unit(s) SubCutaneous three times a day before meals  pyridoxine 50 milliGRAM(s) Oral daily    MEDICATIONS  (PRN):  acetaminophen   Tablet .. 650 milliGRAM(s) Oral every 6 hours PRN Moderate Pain (4 - 6)  dextrose 40% Gel 15 Gram(s) Oral once PRN Blood Glucose LESS THAN 70 milliGRAM(s)/deciliter  glucagon  Injectable 1 milliGRAM(s) IntraMuscular once PRN Glucose LESS THAN 70 milligrams/deciliter

## 2020-07-23 NOTE — PROGRESS NOTE ADULT - SUBJECTIVE AND OBJECTIVE BOX
HPI:  60 yr old diagnosed of latent TB and started on Rifampin and Isoniazid 3 months ago while in [facility]. He came to [place] 2 weeks ago and the dgtr has noticed 20 pds wt loss, weakness, poor appetite, constantly thirsty, increased urination. They attributed it to the TB medications SE. They did take him to their PMD, who recommended that he go to ED. However patient passed out- slumped over while sitting in a chair and did not respond for couple of minutes. Upon shaking after he came awake and said he was fine. Dgtr brought him to ED with concerns.     In ED noted severe hyperglycemia and not in DKA. IVF and Insulin given. (08 Jul 2020 16:55)      Allergies    No Known Allergies    Intolerances        MEDICATIONS  (STANDING):  dextrose 5%. 1000 milliLiter(s) (50 mL/Hr) IV Continuous <Continuous>  dextrose 50% Injectable 12.5 Gram(s) IV Push once  dextrose 50% Injectable 25 Gram(s) IV Push once  dextrose 50% Injectable 25 Gram(s) IV Push once  insulin glargine Injectable (LANTUS) 6 Unit(s) SubCutaneous at bedtime  insulin lispro (HumaLOG) corrective regimen sliding scale   SubCutaneous three times a day before meals  insulin lispro Injectable (HumaLOG) 2 Unit(s) SubCutaneous three times a day before meals  pyridoxine 50 milliGRAM(s) Oral daily    MEDICATIONS  (PRN):  acetaminophen   Tablet .. 650 milliGRAM(s) Oral every 6 hours PRN Moderate Pain (4 - 6)  dextrose 40% Gel 15 Gram(s) Oral once PRN Blood Glucose LESS THAN 70 milliGRAM(s)/deciliter  glucagon  Injectable 1 milliGRAM(s) IntraMuscular once PRN Glucose LESS THAN 70 milligrams/deciliter      REVIEW OF SYSTEMS:    CONSTITUTIONAL: No fever, chills, weight loss, or fatigue  HEENT: No sore throat, runny nose, ear ache  RESPIRATORY: No cough, wheezing, No shortness of breath  CARDIOVASCULAR: No chest pain, palpitations, dizziness  GASTROINTESTINAL: No abdominal pain. No nausea, vomiting, diarrhea  GENITOURINARY: No dysuria, increase frequency, hematuria, or incontinence  NEUROLOGICAL: No headaches, memory loss, loss of strength, numbness, or tremors, no weakness  EXTREMITY: No pedal edema BLE  SKIN: No itching, burning, rashes, or lesions     VITAL SIGNS:  T(C): 36.6 (07-23-20 @ 17:02), Max: 37.3 (07-23-20 @ 06:10)  T(F): 97.9 (07-23-20 @ 17:02), Max: 99.2 (07-23-20 @ 06:10)  HR: 72 (07-23-20 @ 17:02) (72 - 83)  BP: 119/60 (07-23-20 @ 17:02) (101/54 - 121/70)  RR: 17 (07-23-20 @ 17:02) (15 - 18)  SpO2: 99% (07-23-20 @ 17:02) (98% - 100%)  Wt(kg): --    PHYSICAL EXAM:    GENERAL: not in any distress  HEENT: Neck is supple, normocephalic, atraumatic   CHEST/LUNG: Clear to auscultation bilaterally; No rales, rhonchi, wheezing  HEART: Regular rate and rhythm; No murmurs, rubs, or gallops  ABDOMEN: Soft, Nontender, Nondistended; Bowel sounds present, no rebound   EXTREMITIES:  2+ Peripheral Pulses, No clubbing, cyanosis, or edema  GENITOURINARY:   SKIN: No rashes or lesions  BACK: no pressor sore   NERVOUS SYSTEM:  Alert & Oriented X3, Good concentration  PSYCH: normal affect     LABS:                         11.8   6.55  )-----------( 343      ( 23 Jul 2020 04:13 )             34.3     07-23    136  |  103  |  20  ----------------------------<  153<H>  4.4   |  27  |  0.91    Ca    8.7      23 Jul 2020 04:13        PT/INR - ( 23 Jul 2020 04:13 )   PT: 12.1 sec;   INR: 1.09 ratio         PTT - ( 23 Jul 2020 04:13 )  PTT:34.0 sec                                      Radiology: HPI:  60 yr old diagnosed of latent TB and started on Rifampin and Isoniazid 3 months ago while in [facility]. He came to [place] 2 weeks ago and the dgtr has noticed 20 pds wt loss, weakness, poor appetite, constantly thirsty, increased urination. They attributed it to the TB medications SE. They did take him to their PMD, who recommended that he go to ED. However patient passed out- slumped over while sitting in a chair and did not respond for couple of minutes. Upon shaking after he came awake and said he was fine. Dgtr brought him to ED with concerns.   In ED noted severe hyperglycemia and not in DKA. IVF and Insulin given. 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 1000 milliLiter(s) (50 mL/Hr) IV Continuous <Continuous>  dextrose 50% Injectable 12.5 Gram(s) IV Push once  dextrose 50% Injectable 25 Gram(s) IV Push once  dextrose 50% Injectable 25 Gram(s) IV Push once  insulin glargine Injectable (LANTUS) 6 Unit(s) SubCutaneous at bedtime  insulin lispro (HumaLOG) corrective regimen sliding scale   SubCutaneous three times a day before meals  insulin lispro Injectable (HumaLOG) 2 Unit(s) SubCutaneous three times a day before meals  pyridoxine 50 milliGRAM(s) Oral daily    MEDICATIONS  (PRN):  acetaminophen   Tablet .. 650 milliGRAM(s) Oral every 6 hours PRN Moderate Pain (4 - 6)  dextrose 40% Gel 15 Gram(s) Oral once PRN Blood Glucose LESS THAN 70 milliGRAM(s)/deciliter  glucagon  Injectable 1 milliGRAM(s) IntraMuscular once PRN Glucose LESS THAN 70 milligrams/deciliter      REVIEW OF SYSTEMS:    CONSTITUTIONAL: No fever, chills,  has weight loss, and  fatigue  HEENT: No sore throat, runny nose, ear ache  RESPIRATORY: No cough, wheezing, No shortness of breath  CARDIOVASCULAR: No chest pain, palpitations, dizziness  GASTROINTESTINAL: No abdominal pain. No nausea, vomiting, diarrhea  GENITOURINARY: No dysuria, increase frequency, hematuria, or incontinence  NEUROLOGICAL: No headaches, memory loss, loss of strength, numbness, or tremors, no weakness  EXTREMITY: No pedal edema BLE  SKIN: No itching, burning, rashes, or lesions     VITAL SIGNS:  T(C): 36.6 (07-23-20 @ 17:02), Max: 37.3 (07-23-20 @ 06:10)  T(F): 97.9 (07-23-20 @ 17:02), Max: 99.2 (07-23-20 @ 06:10)  HR: 72 (07-23-20 @ 17:02) (72 - 83)  BP: 119/60 (07-23-20 @ 17:02) (101/54 - 121/70)  RR: 17 (07-23-20 @ 17:02) (15 - 18)  SpO2: 99% (07-23-20 @ 17:02) (98% - 100%)  Wt(kg): --    PHYSICAL EXAM:    GENERAL: not in any distress  HEENT: Neck is supple, normocephalic, atraumatic   CHEST/LUNG: Clear to auscultation bilaterally; No rales, rhonchi, wheezing  HEART: Regular rate and rhythm; No murmurs, rubs, or gallops  ABDOMEN: Soft, Nontender, Nondistended; Bowel sounds present, no rebound   EXTREMITIES:  2+ Peripheral Pulses, No clubbing, cyanosis, or edema  SKIN: No rashes or lesions  BACK: no pressor sore   NERVOUS SYSTEM:  Alert & Oriented X3, Good concentration  PSYCH: normal affect     LABS:                         11.8   6.55  )-----------( 343      ( 23 Jul 2020 04:13 )             34.3     07-23    136  |  103  |  20  ----------------------------<  153<H>  4.4   |  27  |  0.91    Ca    8.7      23 Jul 2020 04:13        PT/INR - ( 23 Jul 2020 04:13 )   PT: 12.1 sec;   INR: 1.09 ratio         PTT - ( 23 Jul 2020 04:13 )  PTT:34.0 sec                                      Radiology:      < from: CT Angio Chest w/ IV Cont (07.06.20 @ 14:41) >  IMPRESSION:   Right upper lobe opacities, possibly infectious. Recommend continued follow-up to resolution.  Negative for pulmonary embolism.  Bilateral nonobstructive nephrolithiasis. No hydronephrosis.                GRACE SALVADOR M.D., ATTENDING RADIOLOGIST  This document has been electronically signed. Jul 6 2020  3:08PM          < end of copied text >

## 2020-07-23 NOTE — PROGRESS NOTE ADULT - ASSESSMENT
60 yr old with Latent TB from [facility] with wt loss, constant thirst, poor appetite, weakness and increased urination .    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   - Positive QuantiFeron, Neg HIV  - Discussed with Dr Flores. she is gonna do bronchoscopy today around 3. Patient has to be NPO per recs. hold anticoagulation.       Syncope:  - Likely vasovagal   - In the setting of dehydration uncontrolled DM, viral illness  - PE ruled out  - CT head neg  - 2D echo with preserved EF, no acute path    DM type 2.  Blood sugars fluctuating. (low normal and elevated blood sugars.   cont current decreased insulin regimen. Follow finger sticks.   - DKA ruled out  - Hb A1c 10.6  Endo on board.     Pseudohyponatremia:  - Corrected Na ok      Severe protein-calorie malnutrition:  - Nutritional supplements.       MERCEDES:  - Resolved  - B/L non obstructing stone, outpt follow up     Headache. Cont prn tylenol.     Blurred vision. Needs outpatient Ophthalmologist follow up.    DVT ppx on hold for bronchoscopy. will restart tomorrow.

## 2020-07-23 NOTE — CHART NOTE - NSCHARTNOTEFT_GEN_A_CORE
Notified by Dr. Flores that scheduled bronchoscopy would be delayed due to OR scheduling/emergent case.  Discussed with patient and cousin (via telephone).  Patient asking to reschedule OR for tomorrow.  Requesting to eat this evening.   Discussed with Dr. Flores.  Will postpone bronch until tomorrow.  NPO after midnight.

## 2020-07-23 NOTE — PROGRESS NOTE ADULT - SUBJECTIVE AND OBJECTIVE BOX
Patient is a 60y old  Male who presents with a chief complaint of syncope (22 Jul 2020 11:44)      INTERVAL HPI/OVERNIGHT EVENTS:  no events overnight  NPO for bronchoscopy    MEDICATIONS  (STANDING):  dextrose 5%. 1000 milliLiter(s) (50 mL/Hr) IV Continuous <Continuous>  dextrose 50% Injectable 12.5 Gram(s) IV Push once  dextrose 50% Injectable 25 Gram(s) IV Push once  dextrose 50% Injectable 25 Gram(s) IV Push once  enoxaparin Injectable 40 milliGRAM(s) SubCutaneous daily  insulin glargine Injectable (LANTUS) 6 Unit(s) SubCutaneous at bedtime  insulin lispro (HumaLOG) corrective regimen sliding scale   SubCutaneous three times a day before meals  insulin lispro Injectable (HumaLOG) 2 Unit(s) SubCutaneous three times a day before meals  pyridoxine 50 milliGRAM(s) Oral daily    MEDICATIONS  (PRN):  acetaminophen   Tablet .. 650 milliGRAM(s) Oral every 6 hours PRN Moderate Pain (4 - 6)  dextrose 40% Gel 15 Gram(s) Oral once PRN Blood Glucose LESS THAN 70 milliGRAM(s)/deciliter  glucagon  Injectable 1 milliGRAM(s) IntraMuscular once PRN Glucose LESS THAN 70 milligrams/deciliter      REVIEW OF SYSTEMS:  CONSTITUTIONAL: No fever        Vital Signs Last 24 Hrs  T(C): 37.3 (23 Jul 2020 06:10), Max: 37.3 (22 Jul 2020 16:58)  T(F): 99.2 (23 Jul 2020 06:10), Max: 99.2 (23 Jul 2020 06:10)  HR: 83 (23 Jul 2020 06:10) (75 - 89)  BP: 121/70 (23 Jul 2020 06:10) (101/54 - 139/84)  BP(mean): --  RR: 18 (23 Jul 2020 06:10) (16 - 18)  SpO2: 100% (23 Jul 2020 06:10) (99% - 100%)    PHYSICAL EXAM:  deferred      LABS:                        11.8   6.55  )-----------( 343      ( 23 Jul 2020 04:13 )             34.3     07-23    136  |  103  |  20  ----------------------------<  153<H>  4.4   |  27  |  0.91    Ca    8.7      23 Jul 2020 04:13      PT/INR - ( 23 Jul 2020 04:13 )   PT: 12.1 sec;   INR: 1.09 ratio         PTT - ( 23 Jul 2020 04:13 )  PTT:34.0 sec    CAPILLARY BLOOD GLUCOSE      POCT Blood Glucose.: 164 mg/dL (23 Jul 2020 05:55)  POCT Blood Glucose.: 174 mg/dL (23 Jul 2020 00:20)  POCT Blood Glucose.: 186 mg/dL (22 Jul 2020 21:48)  POCT Blood Glucose.: 150 mg/dL (22 Jul 2020 16:17)  POCT Blood Glucose.: 129 mg/dL (22 Jul 2020 11:19)    Lipid panel:               RADIOLOGY & ADDITIONAL TESTS:

## 2020-07-24 ENCOUNTER — RESULT REVIEW (OUTPATIENT)
Age: 60
End: 2020-07-24

## 2020-07-24 LAB
ANION GAP SERPL CALC-SCNC: 4 MMOL/L — LOW (ref 5–17)
BUN SERPL-MCNC: 13 MG/DL — SIGNIFICANT CHANGE UP (ref 7–23)
CALCIUM SERPL-MCNC: 8.8 MG/DL — SIGNIFICANT CHANGE UP (ref 8.5–10.1)
CHLORIDE SERPL-SCNC: 105 MMOL/L — SIGNIFICANT CHANGE UP (ref 96–108)
CO2 SERPL-SCNC: 29 MMOL/L — SIGNIFICANT CHANGE UP (ref 22–31)
CREAT SERPL-MCNC: 0.76 MG/DL — SIGNIFICANT CHANGE UP (ref 0.5–1.3)
GLUCOSE BLDC GLUCOMTR-MCNC: 118 MG/DL — HIGH (ref 70–99)
GLUCOSE BLDC GLUCOMTR-MCNC: 123 MG/DL — HIGH (ref 70–99)
GLUCOSE BLDC GLUCOMTR-MCNC: 140 MG/DL — HIGH (ref 70–99)
GLUCOSE BLDC GLUCOMTR-MCNC: 202 MG/DL — HIGH (ref 70–99)
GLUCOSE BLDC GLUCOMTR-MCNC: 64 MG/DL — LOW (ref 70–99)
GLUCOSE BLDC GLUCOMTR-MCNC: 69 MG/DL — LOW (ref 70–99)
GLUCOSE SERPL-MCNC: 84 MG/DL — SIGNIFICANT CHANGE UP (ref 70–99)
HCT VFR BLD CALC: 35.4 % — LOW (ref 39–50)
HGB BLD-MCNC: 12.1 G/DL — LOW (ref 13–17)
MCHC RBC-ENTMCNC: 23.6 PG — LOW (ref 27–34)
MCHC RBC-ENTMCNC: 34.2 GM/DL — SIGNIFICANT CHANGE UP (ref 32–36)
MCV RBC AUTO: 69 FL — LOW (ref 80–100)
NRBC # BLD: 1 /100 WBCS — HIGH (ref 0–0)
PLATELET # BLD AUTO: 346 K/UL — SIGNIFICANT CHANGE UP (ref 150–400)
POTASSIUM SERPL-MCNC: 4.1 MMOL/L — SIGNIFICANT CHANGE UP (ref 3.5–5.3)
POTASSIUM SERPL-SCNC: 4.1 MMOL/L — SIGNIFICANT CHANGE UP (ref 3.5–5.3)
RBC # BLD: 5.13 M/UL — SIGNIFICANT CHANGE UP (ref 4.2–5.8)
RBC # FLD: 18.6 % — HIGH (ref 10.3–14.5)
SODIUM SERPL-SCNC: 138 MMOL/L — SIGNIFICANT CHANGE UP (ref 135–145)
WBC # BLD: 5.05 K/UL — SIGNIFICANT CHANGE UP (ref 3.8–10.5)
WBC # FLD AUTO: 5.05 K/UL — SIGNIFICANT CHANGE UP (ref 3.8–10.5)

## 2020-07-24 PROCEDURE — 99233 SBSQ HOSP IP/OBS HIGH 50: CPT

## 2020-07-24 PROCEDURE — 88305 TISSUE EXAM BY PATHOLOGIST: CPT | Mod: 26

## 2020-07-24 PROCEDURE — 88312 SPECIAL STAINS GROUP 1: CPT | Mod: 26

## 2020-07-24 RX ORDER — INSULIN LISPRO 100/ML
2 VIAL (ML) SUBCUTANEOUS
Refills: 0 | Status: DISCONTINUED | OUTPATIENT
Start: 2020-07-24 | End: 2020-07-30

## 2020-07-24 RX ORDER — SODIUM CHLORIDE 9 MG/ML
1000 INJECTION, SOLUTION INTRAVENOUS ONCE
Refills: 0 | Status: COMPLETED | OUTPATIENT
Start: 2020-07-24 | End: 2020-07-24

## 2020-07-24 RX ORDER — BENZOCAINE AND MENTHOL 5; 1 G/100ML; G/100ML
1 LIQUID ORAL
Refills: 0 | Status: DISCONTINUED | OUTPATIENT
Start: 2020-07-24 | End: 2020-07-31

## 2020-07-24 RX ORDER — DEXTROSE 50 % IN WATER 50 %
25 SYRINGE (ML) INTRAVENOUS ONCE
Refills: 0 | Status: COMPLETED | OUTPATIENT
Start: 2020-07-24 | End: 2020-07-24

## 2020-07-24 RX ADMIN — Medication 2 UNIT(S): at 08:22

## 2020-07-24 RX ADMIN — BENZOCAINE AND MENTHOL 1 LOZENGE: 5; 1 LIQUID ORAL at 20:26

## 2020-07-24 RX ADMIN — Medication 25 GRAM(S): at 12:14

## 2020-07-24 RX ADMIN — SODIUM CHLORIDE 1000 MILLILITER(S): 9 INJECTION, SOLUTION INTRAVENOUS at 20:57

## 2020-07-24 RX ADMIN — INSULIN GLARGINE 6 UNIT(S): 100 INJECTION, SOLUTION SUBCUTANEOUS at 22:46

## 2020-07-24 NOTE — PROGRESS NOTE ADULT - SUBJECTIVE AND OBJECTIVE BOX
INTERVAL HPI:  60 yr male, reports being  diagnosed of latent TB(not clear how) and started on Rifampin and Isoniazid 3 months ago while in [facility]. Reports had cough and fever in April.  [place], went to [facility] in January in January but could not return due to COVID.  Came to [place] 2 weeks ago,  daughter noticed 20 pound  wt loss, weakness, poor appetite, constantly thirsty, increased urination. They attributed it to the TB medications Took  him to  PMD, who recommended that he go to ED, passed out-( slumped over while sitting in a chair ) and did not respond for couple of minutes.  In ED noted severe hyperglycemia(new diagnosis of DM) but not in DKA. IVF and Insulin given. (08 Jul 2020 16:55).  AS above had cough and fever in April which improved after getting started on INH+ Rifampin.   Denied fever, chills,  cough , sputum production or night sweats.  07/24/20: Bronchoscopy with BAL and biopsy.    OVERNIGHT EVENTS:  comfortable.    Vital Signs Last 24 Hrs  T(C): 36.7 (24 Jul 2020 13:40), Max: 36.9 (23 Jul 2020 23:26)  T(F): 98 (24 Jul 2020 13:40), Max: 98.5 (24 Jul 2020 05:45)  HR: 66 (24 Jul 2020 13:40) (66 - 74)  BP: 96/54 (24 Jul 2020 13:40) (96/54 - 110/66)  BP(mean): --  RR: 17 (24 Jul 2020 13:40) (16 - 17)  SpO2: 98% (24 Jul 2020 13:40) (98% - 100%)    PHYSICAL EXAM:  GEN:         Awake, responsive and comfortable.  HEENT:    Normal.    RESP:        no distress  CVS:             Regular rate and rhythm.   ABD:         Soft, non-tender, non-distended;     MEDICATIONS  (STANDING):  dextrose 5%. 1000 milliLiter(s) (50 mL/Hr) IV Continuous <Continuous>  dextrose 50% Injectable 12.5 Gram(s) IV Push once  dextrose 50% Injectable 25 Gram(s) IV Push once  dextrose 50% Injectable 25 Gram(s) IV Push once  insulin glargine Injectable (LANTUS) 6 Unit(s) SubCutaneous at bedtime  insulin lispro (HumaLOG) corrective regimen sliding scale   SubCutaneous three times a day before meals  insulin lispro Injectable (HumaLOG) 2 Unit(s) SubCutaneous three times a day before meals  pyridoxine 50 milliGRAM(s) Oral daily    MEDICATIONS  (PRN):  acetaminophen   Tablet .. 650 milliGRAM(s) Oral every 6 hours PRN Moderate Pain (4 - 6)  dextrose 40% Gel 15 Gram(s) Oral once PRN Blood Glucose LESS THAN 70 milliGRAM(s)/deciliter  glucagon  Injectable 1 milliGRAM(s) IntraMuscular once PRN Glucose LESS THAN 70 milligrams/deciliter    LABS:                        12.1   5.05  )-----------( 346      ( 24 Jul 2020 07:38 )             35.4     07-24    138  |  105  |  13  ----------------------------<  84  4.1   |  29  |  0.76    Ca    8.8      24 Jul 2020 07:38    PT/INR - ( 23 Jul 2020 04:13 )   PT: 12.1 sec;   INR: 1.09 ratio      PTT - ( 23 Jul 2020 04:13 )  PTT:34.0 sec   ASSESSMENT AND PLAN:  ·	RUL opacities .  ·	AFB growing in one of  broth culture( not MTB or ARELY).  ·	Positive QuantiFeron Gold and weight loss.  ·	Newly diagnosed DM  ·	Hyperglycemia.  ·	New DM  ·	Dehydration.    Final identification for AFB awaited.  Follow bronchoscopy results.

## 2020-07-24 NOTE — PROGRESS NOTE ADULT - SUBJECTIVE AND OBJECTIVE BOX
CHIEF COMPLAINT: Bronchoscopy was cancelled yesterday because of some other emergency.  No overnight events.   no fever  no diarrhea  no chest pain  no shortness of breath at rest.           PHYSICAL EXAM:    GENERAL: Thinly built. no Acute distress  CHEST/LUNG: Clear to ausculation bilaterally, no wheezing, no crackles   HEART: Regular rate and rhythm; No murmurs, rubs  ABDOMEN: Soft, Nontender, Nondistended; Bowel sounds present  EXTREMITIES:  Moving all four extremities spontaneously, No clubbing, cyanosis, or edema  NERVOUS SYSTEM:  Grossly non focal.  Psychiatry: AAO x 3, mood is appropriate       OBJECTIVE DATA:     Vital Signs Last 24 Hrs  T(C): 36.9 (2020 05:45), Max: 37.1 (2020 14:44)  T(F): 98.5 (2020 05:45), Max: 98.8 (2020 14:44)  HR: 74 (2020 05:45) (72 - 80)  BP: 110/66 (2020 05:45) (97/63 - 119/60)  BP(mean): --  RR: 16 (2020 05:45) (15 - 17)  SpO2: 100% (2020 05:45) (98% - 100%)           Daily     Daily Weight in k.1 (2020 05:45)  LABS:                        12.1   5.05  )-----------( 346      ( 2020 07:38 )             35.4             07-24    138  |  105  |  13  ----------------------------<  84  4.1   |  29  |  0.76    Ca    8.8      2020 07:38                PT/INR - ( 2020 04:13 )   PT: 12.1 sec;   INR: 1.09 ratio         PTT - ( 2020 04:13 )  PTT:34.0 sec         CAPILLARY BLOOD GLUCOSE      POCT Blood Glucose.: 123 mg/dL (2020 08:06)    	  MEDICATIONS  (STANDING):  dextrose 5%. 1000 milliLiter(s) (50 mL/Hr) IV Continuous <Continuous>  dextrose 50% Injectable 12.5 Gram(s) IV Push once  dextrose 50% Injectable 25 Gram(s) IV Push once  dextrose 50% Injectable 25 Gram(s) IV Push once  insulin glargine Injectable (LANTUS) 6 Unit(s) SubCutaneous at bedtime  insulin lispro (HumaLOG) corrective regimen sliding scale   SubCutaneous three times a day before meals  insulin lispro Injectable (HumaLOG) 2 Unit(s) SubCutaneous three times a day before meals  pyridoxine 50 milliGRAM(s) Oral daily    MEDICATIONS  (PRN):  acetaminophen   Tablet .. 650 milliGRAM(s) Oral every 6 hours PRN Moderate Pain (4 - 6)  dextrose 40% Gel 15 Gram(s) Oral once PRN Blood Glucose LESS THAN 70 milliGRAM(s)/deciliter  glucagon  Injectable 1 milliGRAM(s) IntraMuscular once PRN Glucose LESS THAN 70 milligrams/deciliter

## 2020-07-24 NOTE — BRIEF OPERATIVE NOTE - COMMENTS
New onset atrial fibrillation with RVR on induction, sustained throughout case, hemodynamically stable

## 2020-07-24 NOTE — BRIEF OPERATIVE NOTE - NSICDXBRIEFPROCEDURE_GEN_ALL_CORE_FT
PROCEDURES:  Bronchoscopy with biopsy and bronchoalveolar lavage 24-Jul-2020 17:23:09  Nirmala Flores

## 2020-07-24 NOTE — PROGRESS NOTE ADULT - ASSESSMENT
weight loss and cough with + QF gold but esr normal???  concerning as afb was done while on tx for latent TB deceasing its yield     Pulmonology following possible bronchoscopy    HIV: NR  DANIELLE, ANCA: Negative    7/9 AFB: Growth AFB detected  not MTB or ARELY by DNA probe, still pending identification.    7/10 AFB: negative so far  7/11 AFB: negative so far    -bronchoscopy cancel yesterday, reschedule for today  -Thoracic sx following  -Continue Isolation Precautions  -Micro lab was called yesterday and is not MTB or ARELY by DNA probe, still pending identification.  JOHANNE was informed about the result.

## 2020-07-24 NOTE — CONSULT NOTE ADULT - SUBJECTIVE AND OBJECTIVE BOX
NTERVAL HPI:  60 yr male, reports being  diagnosed of latent TB(not clear how) and started on Rifampin and Isoniazid 3 months ago while in [facility]. Reports had cough and fever in April.  [place], went to [facility] in January in January but could not return due to COVID.  Came to [place] 2 weeks ago,  daughter noticed 20 pound  wt loss, weakness, poor appetite, constantly thirsty, increased urination. They attributed it to the TB medications Took  him to  PMD, who recommended that he go to ED, passed out-( slumped over while sitting in a chair ) and did not respond for couple of minutes.  In ED noted severe hyperglycemia(new diagnosis of DM) but not in DKA. IVF and Insulin given. (08 Jul 2020 16:55).  AS above had cough and fever in April which improved after getting started on INH+ Rifampin.   Denied fever, chills,  cough , sputum production or night sweats. Patient HIV negative and COVID negative x 2 this admission   07/24/20: Bronchoscopy with BAL and biopsy after + Quantiferon , JOHANNE made aware AFB culture positive NOT ARELY NOT MDR TB. Post operative systolic  blood pressure < 100 medicine requesting ICU Consult    MEDICATIONS  (STANDING):  benzocaine 15 mG/menthol 3.6 mG (Sugar-Free) Lozenge 1 Lozenge Oral four times a day  dextrose 5%. 1000 milliLiter(s) (50 mL/Hr) IV Continuous <Continuous>  dextrose 50% Injectable 12.5 Gram(s) IV Push once  dextrose 50% Injectable 25 Gram(s) IV Push once  dextrose 50% Injectable 25 Gram(s) IV Push once  insulin glargine Injectable (LANTUS) 6 Unit(s) SubCutaneous at bedtime  insulin lispro (HumaLOG) corrective regimen sliding scale   SubCutaneous three times a day before meals  insulin lispro Injectable (HumaLOG) 2 Unit(s) SubCutaneous three times a day before meals  pyridoxine 50 milliGRAM(s) Oral daily    MEDICATIONS  (PRN):  acetaminophen   Tablet .. 650 milliGRAM(s) Oral every 6 hours PRN Moderate Pain (4 - 6)  dextrose 40% Gel 15 Gram(s) Oral once PRN Blood Glucose LESS THAN 70 milliGRAM(s)/deciliter  glucagon  Injectable 1 milliGRAM(s) IntraMuscular once PRN Glucose LESS THAN 70 milligrams/deciliter\    ICU Vital Signs Last 24 Hrs  T(C): 35.4 (24 Jul 2020 18:40), Max: 36.9 (23 Jul 2020 23:26)  T(F): 95.8 (24 Jul 2020 18:40), Max: 98.5 (24 Jul 2020 05:45)  HR: 81 (24 Jul 2020 19:00) (66 - 84)  BP: 99/63 (24 Jul 2020 19:00) (92/58 - 110/66)  BP(mean): 71 (24 Jul 2020 19:00) (66 - 71)  RR: 21 (24 Jul 2020 19:00) (12 - 21)  SpO2: 100% (24 Jul 2020 19:00) (98% - 100%)    PHYSICAL EXAM:      Constitutional: alert and awake with complaints of sore throat , thin     ENMT: EOMI, PERRLA    Neck: supple     Respiratory: CTA    Cardiovascular: s1/s2    Gastrointestinal: soft, nondistended     Neurological: axox3    Skin: intact    Lymph Nodes: no adenopathy       ·	RUL opacities .         AFB growing in one of  broth culture( not MTB or ARELY).  ·	Positive QuantiFeron Gold and weight loss. Discontinue pyridoxine  ·	Newly diagnosed DM- FS sliding scale  ·	Post operative hypotension- LR and monitoring     Patient stable,Final identification for AFB awaited.  Follow bronchoscopy results.  ICU 30 minutes time

## 2020-07-24 NOTE — PROGRESS NOTE ADULT - ASSESSMENT
60 yr old with Latent TB from [facility] with wt loss, constant thirst, poor appetite, weakness and increased urination .    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   - Positive QuantiFeron, Neg HIV  - Bronch scheduled for today. reviewed consultants note.   follow bronch studies.       Syncope:  - Likely vasovagal   - In the setting of dehydration uncontrolled DM, viral illness  - PE ruled out  - CT head neg  - 2D echo with preserved EF, no acute path    DM type 2.  controlled blood sugars. cont current management.   - DKA ruled out  - Hb A1c 10.6  Endo on board.     Pseudohyponatremia:  - Corrected Na ok      Severe protein-calorie malnutrition:  - Nutritional supplements.       MERCEDES:  - Resolved  - B/L non obstructing stone, outpt follow up     Headache. Cont prn tylenol.     Blurred vision. Needs outpatient Ophthalmologist follow up.    DVT ppx on hold for bronchoscopy. will restart tomorrow.

## 2020-07-24 NOTE — PROGRESS NOTE ADULT - SUBJECTIVE AND OBJECTIVE BOX
Patient is a 60y old  Male who presents with a chief complaint of p (23 Jul 2020 17:50)      INTERVAL HPI/OVERNIGHT EVENTS:  pt rescheduled fro bronchoscopy today  npo  NAD    MEDICATIONS  (STANDING):  dextrose 5%. 1000 milliLiter(s) (50 mL/Hr) IV Continuous <Continuous>  dextrose 50% Injectable 12.5 Gram(s) IV Push once  dextrose 50% Injectable 25 Gram(s) IV Push once  dextrose 50% Injectable 25 Gram(s) IV Push once  insulin glargine Injectable (LANTUS) 6 Unit(s) SubCutaneous at bedtime  insulin lispro (HumaLOG) corrective regimen sliding scale   SubCutaneous three times a day before meals  insulin lispro Injectable (HumaLOG) 2 Unit(s) SubCutaneous three times a day before meals  pyridoxine 50 milliGRAM(s) Oral daily    MEDICATIONS  (PRN):  acetaminophen   Tablet .. 650 milliGRAM(s) Oral every 6 hours PRN Moderate Pain (4 - 6)  dextrose 40% Gel 15 Gram(s) Oral once PRN Blood Glucose LESS THAN 70 milliGRAM(s)/deciliter  glucagon  Injectable 1 milliGRAM(s) IntraMuscular once PRN Glucose LESS THAN 70 milligrams/deciliter      REVIEW OF SYSTEMS:  CONSTITUTIONAL: No fever, weight loss, or fatigue  RESPIRATORY: No cough, wheezing, chills or hemoptysis; No shortness of breath  CARDIOVASCULAR: No chest pain, palpitations, dizziness, or leg swelling  GASTROINTESTINAL: No abdominal or epigastric pain. No nausea, vomiting, or hematemesis; No diarrhea or constipation. No melena or hematochezia.  ENDOCRINE: No heat or cold intolerance; No hair loss      Vital Signs Last 24 Hrs  T(C): 36.9 (24 Jul 2020 05:45), Max: 37.1 (23 Jul 2020 14:44)  T(F): 98.5 (24 Jul 2020 05:45), Max: 98.8 (23 Jul 2020 14:44)  HR: 74 (24 Jul 2020 05:45) (72 - 80)  BP: 110/66 (24 Jul 2020 05:45) (97/63 - 119/60)  BP(mean): --  RR: 16 (24 Jul 2020 05:45) (15 - 17)  SpO2: 100% (24 Jul 2020 05:45) (98% - 100%)    PHYSICAL EXAM:  GENERAL: NAD      LABS:                        12.1   5.05  )-----------( 346      ( 24 Jul 2020 07:38 )             35.4     07-24    138  |  105  |  13  ----------------------------<  84  4.1   |  29  |  0.76    Ca    8.8      24 Jul 2020 07:38      PT/INR - ( 23 Jul 2020 04:13 )   PT: 12.1 sec;   INR: 1.09 ratio         PTT - ( 23 Jul 2020 04:13 )  PTT:34.0 sec    CAPILLARY BLOOD GLUCOSE      POCT Blood Glucose.: 123 mg/dL (24 Jul 2020 08:06)  POCT Blood Glucose.: 339 mg/dL (23 Jul 2020 21:57)  POCT Blood Glucose.: 117 mg/dL (23 Jul 2020 17:56)  POCT Blood Glucose.: 131 mg/dL (23 Jul 2020 11:22)    Lipid panel:               RADIOLOGY & ADDITIONAL TESTS:

## 2020-07-24 NOTE — PROGRESS NOTE ADULT - SUBJECTIVE AND OBJECTIVE BOX
Pre-operative Note  - Surgeon: Dr Flores  - Pre-operative Diagnosis: suspected tuberculosis  - Procedure: bronchoscopy    Pt comfortable bedside, denies cough    - Labs:                        12.1   5.05  )-----------( 346      ( 24 Jul 2020 07:38 )             35.4     07-24    138  |  105  |  13  ----------------------------<  84  4.1   |  29  |  0.76    Ca    8.8      24 Jul 2020 07:38      PT/INR - ( 23 Jul 2020 04:13 )   PT: 12.1 sec;   INR: 1.09 ratio    PTT - ( 23 Jul 2020 04:13 )  PTT:34.0 sec    < from: CT Angio Chest w/ IV Cont (07.06.20 @ 14:41) >  Right upper lobe opacities, possibly infectious. Recommend continued follow-up to resolution.    < end of copied text >    > NPO  > Case scheduled with OR this afternoon. Consent to be obtained by surgeon Pre-operative Note  - Surgeon: Dr Flores  - Pre-operative Diagnosis: suspected tuberculosis  - Procedure: bronchoscopy    Pt comfortable bedside, denies cough    - Labs:                        12.1   5.05  )-----------( 346      ( 24 Jul 2020 07:38 )             35.4     07-24    138  |  105  |  13  ----------------------------<  84  4.1   |  29  |  0.76    Ca    8.8      24 Jul 2020 07:38      PT/INR - ( 23 Jul 2020 04:13 )   PT: 12.1 sec;   INR: 1.09 ratio    PTT - ( 23 Jul 2020 04:13 )  PTT:34.0 sec    COVID neg 7/22    < from: CT Angio Chest w/ IV Cont (07.06.20 @ 14:41) >  Right upper lobe opacities, possibly infectious. Recommend continued follow-up to resolution.    < end of copied text >    > NPO  > Case scheduled with OR this afternoon. Consent to be obtained by surgeon

## 2020-07-24 NOTE — PROGRESS NOTE ADULT - SUBJECTIVE AND OBJECTIVE BOX
HPI:  60 yr old diagnosed of latent TB and started on Rifampin and Isoniazid 3 months ago while in [facility]. He came to [place] 2 weeks ago and the dgtr has noticed 20 pds wt loss, weakness, poor appetite, constantly thirsty, increased urination. They attributed it to the TB medications SE. They did take him to their PMD, who recommended that he go to ED. However patient passed out- slumped over while sitting in a chair and did not respond for couple of minutes. Upon shaking after he came awake and said he was fine. Dgtr brought him to ED with concerns.     In ED noted severe hyperglycemia and not in DKA. IVF and Insulin given. (08 Jul 2020 16:55)      Allergies    No Known Allergies    Intolerances        MEDICATIONS  (STANDING):  dextrose 5%. 1000 milliLiter(s) (50 mL/Hr) IV Continuous <Continuous>  dextrose 50% Injectable 12.5 Gram(s) IV Push once  dextrose 50% Injectable 25 Gram(s) IV Push once  dextrose 50% Injectable 25 Gram(s) IV Push once  insulin glargine Injectable (LANTUS) 6 Unit(s) SubCutaneous at bedtime  insulin lispro (HumaLOG) corrective regimen sliding scale   SubCutaneous three times a day before meals  insulin lispro Injectable (HumaLOG) 2 Unit(s) SubCutaneous three times a day before meals  pyridoxine 50 milliGRAM(s) Oral daily    MEDICATIONS  (PRN):  acetaminophen   Tablet .. 650 milliGRAM(s) Oral every 6 hours PRN Moderate Pain (4 - 6)  dextrose 40% Gel 15 Gram(s) Oral once PRN Blood Glucose LESS THAN 70 milliGRAM(s)/deciliter  glucagon  Injectable 1 milliGRAM(s) IntraMuscular once PRN Glucose LESS THAN 70 milligrams/deciliter      REVIEW OF SYSTEMS:    CONSTITUTIONAL: No fever, chills, weight loss, or fatigue  HEENT: No sore throat, runny nose, ear ache  RESPIRATORY: No cough, wheezing, No shortness of breath  CARDIOVASCULAR: No chest pain, palpitations, dizziness  GASTROINTESTINAL: No abdominal pain. No nausea, vomiting, diarrhea  GENITOURINARY: No dysuria, increase frequency, hematuria, or incontinence  NEUROLOGICAL: No headaches, memory loss, loss of strength, numbness, or tremors, no weakness  EXTREMITY: No pedal edema BLE  SKIN: No itching, burning, rashes, or lesions     VITAL SIGNS:  T(C): 36.7 (07-24-20 @ 13:40), Max: 37.1 (07-23-20 @ 14:44)  T(F): 98 (07-24-20 @ 13:40), Max: 98.8 (07-23-20 @ 14:44)  HR: 66 (07-24-20 @ 13:40) (66 - 80)  BP: 96/54 (07-24-20 @ 13:40) (96/54 - 119/60)  RR: 17 (07-24-20 @ 13:40) (15 - 17)  SpO2: 98% (07-24-20 @ 13:40) (98% - 100%)  Wt(kg): --    PHYSICAL EXAM:    GENERAL: not in any distress  HEENT: Neck is supple, normocephalic, atraumatic   CHEST/LUNG: Clear to percussion bilaterally; No rales, rhonchi, wheezing  HEART: Regular rate and rhythm; No murmurs, rubs, or gallops  ABDOMEN: Soft, Nontender, Nondistended; Bowel sounds present, no rebound   EXTREMITIES:  2+ Peripheral Pulses, No clubbing, cyanosis, or edema  GENITOURINARY:   SKIN: No rashes or lesions  BACK: no pressor sore   NERVOUS SYSTEM:  Alert & Oriented X3, Good concentration  PSYCH: normal affect     LABS:                         12.1   5.05  )-----------( 346      ( 24 Jul 2020 07:38 )             35.4     07-24    138  |  105  |  13  ----------------------------<  84  4.1   |  29  |  0.76    Ca    8.8      24 Jul 2020 07:38        PT/INR - ( 23 Jul 2020 04:13 )   PT: 12.1 sec;   INR: 1.09 ratio         PTT - ( 23 Jul 2020 04:13 )  PTT:34.0 sec      Radiology:

## 2020-07-24 NOTE — CHART NOTE - NSCHARTNOTEFT_GEN_A_CORE
Surgery PA called and updated that patient underwent bronchoscopy successfully but in the OR developed Rapid a fib and hypotension.   anesthesiologist started vasopressor.   afib broke into sinus rhythm.  patient still drowsy.   I could not see the patient as patient was still in the OR. Recommended consulting ICU and transferring patient to ICU.       Addendum: Patient was transferred to ICU 1. sign off. ICU team will take over management.

## 2020-07-25 LAB
ALBUMIN SERPL ELPH-MCNC: 3.1 G/DL — LOW (ref 3.3–5)
ALP SERPL-CCNC: 78 U/L — SIGNIFICANT CHANGE UP (ref 40–120)
ALT FLD-CCNC: 43 U/L — SIGNIFICANT CHANGE UP (ref 12–78)
ANION GAP SERPL CALC-SCNC: 5 MMOL/L — SIGNIFICANT CHANGE UP (ref 5–17)
AST SERPL-CCNC: 50 U/L — HIGH (ref 15–37)
BASE EXCESS BLDA CALC-SCNC: 0.4 MMOL/L — SIGNIFICANT CHANGE UP (ref -2–2)
BILIRUB SERPL-MCNC: 0.7 MG/DL — SIGNIFICANT CHANGE UP (ref 0.2–1.2)
BLOOD GAS COMMENTS: SIGNIFICANT CHANGE UP
BLOOD GAS SOURCE: SIGNIFICANT CHANGE UP
BUN SERPL-MCNC: 15 MG/DL — SIGNIFICANT CHANGE UP (ref 7–23)
CALCIUM SERPL-MCNC: 8.7 MG/DL — SIGNIFICANT CHANGE UP (ref 8.5–10.1)
CHLORIDE SERPL-SCNC: 102 MMOL/L — SIGNIFICANT CHANGE UP (ref 96–108)
CO2 SERPL-SCNC: 28 MMOL/L — SIGNIFICANT CHANGE UP (ref 22–31)
CREAT SERPL-MCNC: 0.84 MG/DL — SIGNIFICANT CHANGE UP (ref 0.5–1.3)
GLUCOSE BLDC GLUCOMTR-MCNC: 172 MG/DL — HIGH (ref 70–99)
GLUCOSE BLDC GLUCOMTR-MCNC: 190 MG/DL — HIGH (ref 70–99)
GLUCOSE BLDC GLUCOMTR-MCNC: 231 MG/DL — HIGH (ref 70–99)
GLUCOSE BLDC GLUCOMTR-MCNC: 244 MG/DL — HIGH (ref 70–99)
GLUCOSE SERPL-MCNC: 248 MG/DL — HIGH (ref 70–99)
GRAM STN FLD: SIGNIFICANT CHANGE UP
HCO3 BLDA-SCNC: 25 MMOL/L — SIGNIFICANT CHANGE UP (ref 21–29)
HCT VFR BLD CALC: 36.2 % — LOW (ref 39–50)
HGB BLD-MCNC: 12.1 G/DL — LOW (ref 13–17)
HOROWITZ INDEX BLDA+IHG-RTO: 0.36 — SIGNIFICANT CHANGE UP
MAGNESIUM SERPL-MCNC: 2.2 MG/DL — SIGNIFICANT CHANGE UP (ref 1.6–2.6)
MCHC RBC-ENTMCNC: 23.6 PG — LOW (ref 27–34)
MCHC RBC-ENTMCNC: 33.4 GM/DL — SIGNIFICANT CHANGE UP (ref 32–36)
MCV RBC AUTO: 70.6 FL — LOW (ref 80–100)
NIGHT BLUE STAIN TISS: SIGNIFICANT CHANGE UP
NRBC # BLD: 1 /100 WBCS — HIGH (ref 0–0)
PCO2 BLDA: 42 MMHG — SIGNIFICANT CHANGE UP (ref 32–46)
PH BLD: 7.39 — SIGNIFICANT CHANGE UP (ref 7.35–7.45)
PHOSPHATE SERPL-MCNC: 3.8 MG/DL — SIGNIFICANT CHANGE UP (ref 2.5–4.5)
PLATELET # BLD AUTO: 347 K/UL — SIGNIFICANT CHANGE UP (ref 150–400)
PO2 BLDA: 198 MMHG — HIGH (ref 74–108)
POTASSIUM SERPL-MCNC: 4.6 MMOL/L — SIGNIFICANT CHANGE UP (ref 3.5–5.3)
POTASSIUM SERPL-SCNC: 4.6 MMOL/L — SIGNIFICANT CHANGE UP (ref 3.5–5.3)
PROT SERPL-MCNC: 7.5 GM/DL — SIGNIFICANT CHANGE UP (ref 6–8.3)
RBC # BLD: 5.13 M/UL — SIGNIFICANT CHANGE UP (ref 4.2–5.8)
RBC # FLD: 18.4 % — HIGH (ref 10.3–14.5)
SAO2 % BLDA: 100 % — HIGH (ref 92–96)
SODIUM SERPL-SCNC: 135 MMOL/L — SIGNIFICANT CHANGE UP (ref 135–145)
SPECIMEN SOURCE: SIGNIFICANT CHANGE UP
SPECIMEN SOURCE: SIGNIFICANT CHANGE UP
WBC # BLD: 5.25 K/UL — SIGNIFICANT CHANGE UP (ref 3.8–10.5)
WBC # FLD AUTO: 5.25 K/UL — SIGNIFICANT CHANGE UP (ref 3.8–10.5)

## 2020-07-25 PROCEDURE — 99233 SBSQ HOSP IP/OBS HIGH 50: CPT

## 2020-07-25 RX ORDER — CHLORHEXIDINE GLUCONATE 213 G/1000ML
1 SOLUTION TOPICAL DAILY
Refills: 0 | Status: DISCONTINUED | OUTPATIENT
Start: 2020-07-25 | End: 2020-07-25

## 2020-07-25 RX ORDER — MIDODRINE HYDROCHLORIDE 2.5 MG/1
5 TABLET ORAL EVERY 8 HOURS
Refills: 0 | Status: DISCONTINUED | OUTPATIENT
Start: 2020-07-25 | End: 2020-07-31

## 2020-07-25 RX ORDER — ENOXAPARIN SODIUM 100 MG/ML
40 INJECTION SUBCUTANEOUS DAILY
Refills: 0 | Status: DISCONTINUED | OUTPATIENT
Start: 2020-07-25 | End: 2020-07-31

## 2020-07-25 RX ORDER — MIDODRINE HYDROCHLORIDE 2.5 MG/1
10 TABLET ORAL ONCE
Refills: 0 | Status: COMPLETED | OUTPATIENT
Start: 2020-07-25 | End: 2020-07-25

## 2020-07-25 RX ADMIN — MIDODRINE HYDROCHLORIDE 5 MILLIGRAM(S): 2.5 TABLET ORAL at 23:06

## 2020-07-25 RX ADMIN — CHLORHEXIDINE GLUCONATE 1 APPLICATION(S): 213 SOLUTION TOPICAL at 09:34

## 2020-07-25 RX ADMIN — Medication 2 UNIT(S): at 18:16

## 2020-07-25 RX ADMIN — BENZOCAINE AND MENTHOL 1 LOZENGE: 5; 1 LIQUID ORAL at 23:53

## 2020-07-25 RX ADMIN — BENZOCAINE AND MENTHOL 1 LOZENGE: 5; 1 LIQUID ORAL at 11:25

## 2020-07-25 RX ADMIN — BENZOCAINE AND MENTHOL 1 LOZENGE: 5; 1 LIQUID ORAL at 00:04

## 2020-07-25 RX ADMIN — Medication 1: at 08:39

## 2020-07-25 RX ADMIN — Medication 3: at 18:15

## 2020-07-25 RX ADMIN — BENZOCAINE AND MENTHOL 1 LOZENGE: 5; 1 LIQUID ORAL at 06:34

## 2020-07-25 RX ADMIN — INSULIN GLARGINE 6 UNIT(S): 100 INJECTION, SOLUTION SUBCUTANEOUS at 22:20

## 2020-07-25 RX ADMIN — Medication 2 UNIT(S): at 08:39

## 2020-07-25 RX ADMIN — Medication 3: at 11:19

## 2020-07-25 RX ADMIN — BENZOCAINE AND MENTHOL 1 LOZENGE: 5; 1 LIQUID ORAL at 18:18

## 2020-07-25 RX ADMIN — MIDODRINE HYDROCHLORIDE 10 MILLIGRAM(S): 2.5 TABLET ORAL at 11:20

## 2020-07-25 RX ADMIN — Medication 2 UNIT(S): at 11:19

## 2020-07-25 RX ADMIN — MIDODRINE HYDROCHLORIDE 5 MILLIGRAM(S): 2.5 TABLET ORAL at 14:14

## 2020-07-25 NOTE — CHART NOTE - NSCHARTNOTEFT_GEN_A_CORE
Pt admitted s/p syncope event; c latent TB, recent wt loss; found c newly dx DM. Bronchoscopy + bx performed 7/24; results pending; pt in ICU s/p procedure due to hypotension & AFib; now resolved; being transferred back to medical floor.  Pt educated re EM diet recommendations via video  on 7/22; educational material sent home to daughter's attention so she can review it c pt. Pt to be d/c on oral DM meds per endo note.     Factors impacting intake: [X ] none [ ] nausea  [ ] vomiting [ ] diarrhea [ ] constipation  [ ]chewing problems [ ] swallowing issues  [ ] other:     Diet Prescription: Diet, Consistent Carbohydrate w/Evening Snack:   Supplement Feeding Modality:  Oral  Glucerna Shake Cans or Servings Per Day:  1       Frequency:  Three Times a day (07-09-20 @ 11:35)    Intake:  pt continues c good appetite; also drinking supplement    Current Weight:   51.2 kg (7/25); admission wt 51.8 kg (7/9)  % Weight Change: wt stable x 16 days    Physical Appearance:  no edema noted    Pertinent Medications: MEDICATIONS  (STANDING):  benzocaine 15 mG/menthol 3.6 mG (Sugar-Free) Lozenge 1 Lozenge Oral four times a day  dextrose 5%. 1000 milliLiter(s) (50 mL/Hr) IV Continuous <Continuous>  dextrose 50% Injectable 12.5 Gram(s) IV Push once  dextrose 50% Injectable 25 Gram(s) IV Push once  dextrose 50% Injectable 25 Gram(s) IV Push once  enoxaparin Injectable 40 milliGRAM(s) SubCutaneous daily  insulin glargine Injectable (LANTUS) 6 Unit(s) SubCutaneous at bedtime  insulin lispro (HumaLOG) corrective regimen sliding scale   SubCutaneous three times a day before meals  insulin lispro Injectable (HumaLOG) 2 Unit(s) SubCutaneous three times a day before meals  midodrine 5 milliGRAM(s) Oral every 8 hours    MEDICATIONS  (PRN):  acetaminophen   Tablet .. 650 milliGRAM(s) Oral every 6 hours PRN Moderate Pain (4 - 6)  dextrose 40% Gel 15 Gram(s) Oral once PRN Blood Glucose LESS THAN 70 milliGRAM(s)/deciliter  glucagon  Injectable 1 milliGRAM(s) IntraMuscular once PRN Glucose LESS THAN 70 milligrams/deciliter    Pertinent Labs: 07-25 Na135 mmol/L Glu 248 mg/dL<H> K+ 4.6 mmol/L Cr  0.84 mg/dL BUN 15 mg/dL 07-25 Phos 3.8 mg/dL 07-25 Alb 3.1 g/dL<L>; 07-07-20 A1C 10.6%  07-24 POCT:  123, 64, 69, 202, 118, 140    Skin:   WDL    Estimated Needs:   [X ] no change since previous assessment (7/9)  [ ] recalculated:     Previous Nutrition Diagnosis:   [X ] Severe malnutrition in context of acute illness  Etiology:  Inadequate energy/protein intake related to uncontrolled DM, TB  Sigsn & Symptoms:  13% wt loss x 3 months; moderate/severe fat depletion & severe muscle wasting as noted on 7/22 chart note    GOAL:  Pt to consume >75% meals/supplements - met; promote wt gain of 1#/week during hospitalization  - not met    Nutrition Diagnosis is [X ] ongoing  [ ] resolved [ ] not applicable     New Nutrition Diagnosis: [X ] not applicable    Interventions:   continue current diet rx as noted  Recommend  [ ] Change Diet To:  [ ] Nutrition Supplement  [ ] Nutrition Support  [ ] Other:     Monitoring and Evaluation:   [ X] PO intake [ x ] Tolerance to diet prescription [ x ] weights [ x ] labs[ x ] follow up per protocol  [ ] other:

## 2020-07-25 NOTE — PROGRESS NOTE ADULT - SUBJECTIVE AND OBJECTIVE BOX
INTERVAL HPI:  60 yr male, reports being  diagnosed of latent TB(not clear how) and started on Rifampin and Isoniazid 3 months ago while in [facility]. Reports had cough and fever in April.  [place]ives in [place] 5 years, went to [facility] in January in January but could not return due to COVID.  Came to [place] 2 weeks ago,  daughter noticed 20 pound  wt loss, weakness, poor appetite, constantly thirsty, increased urination. They attributed it to the TB medications Took  him to  PMD, who recommended that he go to ED, passed out-( slumped over while sitting in a chair ) and did not respond for couple of minutes.  In ED noted severe hyperglycemia(new diagnosis of DM) but not in DKA. IVF and Insulin given. (08 Jul 2020 16:55).  AS above had cough and fever in April which improved after getting started on INH+ Rifampin.   Denied fever, chills,  cough , sputum production or night sweats.  07/24/20: Bronchoscopy with BAL and biopsy.    OVERNIGHT EVENTS:  Was observed in ICU post bronch    Vital Signs Last 24 Hrs  T(C): 36.8 (25 Jul 2020 17:23), Max: 36.9 (25 Jul 2020 14:45)  T(F): 98.2 (25 Jul 2020 17:23), Max: 98.4 (25 Jul 2020 14:45)  HR: 71 (25 Jul 2020 18:25) (58 - 93)  BP: 126/66 (25 Jul 2020 18:25) (76/48 - 128/68)  BP(mean): 101 (25 Jul 2020 11:30) (55 - 101)  RR: 17 (25 Jul 2020 18:25) (12 - 21)  SpO2: 97% (25 Jul 2020 18:25) (90% - 100%)    PHYSICAL EXAM:  GEN:         Awake, responsive and comfortable.  HEENT:    Normal.    RESP:        no distress  CVS:             Regular rate and rhythm.   ABD:         Soft, non-tender, non-distended;     MEDICATIONS  (STANDING):  benzocaine 15 mG/menthol 3.6 mG (Sugar-Free) Lozenge 1 Lozenge Oral four times a day  dextrose 5%. 1000 milliLiter(s) (50 mL/Hr) IV Continuous <Continuous>  dextrose 50% Injectable 12.5 Gram(s) IV Push once  dextrose 50% Injectable 25 Gram(s) IV Push once  dextrose 50% Injectable 25 Gram(s) IV Push once  enoxaparin Injectable 40 milliGRAM(s) SubCutaneous daily  insulin glargine Injectable (LANTUS) 6 Unit(s) SubCutaneous at bedtime  insulin lispro (HumaLOG) corrective regimen sliding scale   SubCutaneous three times a day before meals  insulin lispro Injectable (HumaLOG) 2 Unit(s) SubCutaneous three times a day before meals  midodrine 5 milliGRAM(s) Oral every 8 hours    MEDICATIONS  (PRN):  acetaminophen   Tablet .. 650 milliGRAM(s) Oral every 6 hours PRN Moderate Pain (4 - 6)  dextrose 40% Gel 15 Gram(s) Oral once PRN Blood Glucose LESS THAN 70 milliGRAM(s)/deciliter  glucagon  Injectable 1 milliGRAM(s) IntraMuscular once PRN Glucose LESS THAN 70 milligrams/deciliter    LABS:                        12.1   5.25  )-----------( 347      ( 25 Jul 2020 04:23 )             36.2     07-25    135  |  102  |  15  ----------------------------<  248<H>  4.6   |  28  |  0.84    Ca    8.7      25 Jul 2020 04:23  Phos  3.8     07-25  Mg     2.2     07-25    TPro  7.5  /  Alb  3.1<L>  /  TBili  0.7  /  DBili  x   /  AST  50<H>  /  ALT  43  /  AlkPhos  78  07-25 07-25 @ 01:10  pH: 7.39  pCO2: 42  pO2: 198  SaO2: 100    ASSESSMENT AND PLAN:  ·	RUL opacities .  ·	AFB growing in one of  broth culture( not MTB or ARELY).  ·	Positive QuantiFeron Gold and weight loss.  ·	Newly diagnosed DM  ·	Hyperglycemia.  ·	New DM  ·	Dehydration.    No AFB on BAL.  Will discuss with ID.

## 2020-07-25 NOTE — PROGRESS NOTE ADULT - ASSESSMENT
weight loss and cough with + QF gold but esr normal???  concerning as afb was done while on tx for latent TB deceasing its yield   hiv test neg   neg  millie, anca   for bronchoscopy ; cancelled today   for am   not toxic appearing  will be discussed with JOHANNE re discharge plan   afb is not Mtb and not kirsten per micro  mantain isolation weight loss and cough with + QF gold but esr normal???  hiv test neg   neg  millie, anca   sp  bronchoscopy ;  not toxic appearing  will be discussed with JOHANNE re discharge plan   afb is not Mtb and not kirsten per micro  mantain isolation

## 2020-07-25 NOTE — PROGRESS NOTE ADULT - SUBJECTIVE AND OBJECTIVE BOX
Patient is a 60y old  Male who presents with a chief complaint of syncope (24 Jul 2020 10:02)      INTERVAL HPI/OVERNIGHT EVENTS:  pt transferred to ICU s/p bronch due to afib and hypotension  now seen sitting up in bed, eating well    MEDICATIONS  (STANDING):  benzocaine 15 mG/menthol 3.6 mG (Sugar-Free) Lozenge 1 Lozenge Oral four times a day  chlorhexidine 2% Cloths 1 Application(s) Topical daily  dextrose 5%. 1000 milliLiter(s) (50 mL/Hr) IV Continuous <Continuous>  dextrose 50% Injectable 12.5 Gram(s) IV Push once  dextrose 50% Injectable 25 Gram(s) IV Push once  dextrose 50% Injectable 25 Gram(s) IV Push once  insulin glargine Injectable (LANTUS) 6 Unit(s) SubCutaneous at bedtime  insulin lispro (HumaLOG) corrective regimen sliding scale   SubCutaneous three times a day before meals  insulin lispro Injectable (HumaLOG) 2 Unit(s) SubCutaneous three times a day before meals    MEDICATIONS  (PRN):  acetaminophen   Tablet .. 650 milliGRAM(s) Oral every 6 hours PRN Moderate Pain (4 - 6)  dextrose 40% Gel 15 Gram(s) Oral once PRN Blood Glucose LESS THAN 70 milliGRAM(s)/deciliter  glucagon  Injectable 1 milliGRAM(s) IntraMuscular once PRN Glucose LESS THAN 70 milligrams/deciliter    Vital Signs Last 24 Hrs  T(C): 36.1 (25 Jul 2020 05:35), Max: 36.7 (24 Jul 2020 13:40)  T(F): 96.9 (25 Jul 2020 05:35), Max: 98 (24 Jul 2020 13:40)  HR: 58 (25 Jul 2020 08:00) (58 - 93)  BP: 95/62 (25 Jul 2020 08:00) (89/60 - 101/59)  BP(mean): 70 (25 Jul 2020 08:00) (65 - 78)  RR: 16 (25 Jul 2020 08:00) (12 - 21)  SpO2: 99% (25 Jul 2020 08:00) (90% - 100%)    PHYSICAL EXAM:  GENERAL: NAD    LABS:                        12.1   5.25  )-----------( 347      ( 25 Jul 2020 04:23 )             36.2     07-25    135  |  102  |  15  ----------------------------<  248<H>  4.6   |  28  |  0.84    Ca    8.7      25 Jul 2020 04:23  Phos  3.8     07-25  Mg     2.2     07-25    TPro  7.5  /  Alb  3.1<L>  /  TBili  0.7  /  DBili  x   /  AST  50<H>  /  ALT  43  /  AlkPhos  78  07-25        CAPILLARY BLOOD GLUCOSE      POCT Blood Glucose.: 190 mg/dL (25 Jul 2020 07:40)  POCT Blood Glucose.: 140 mg/dL (24 Jul 2020 21:34)  POCT Blood Glucose.: 118 mg/dL (24 Jul 2020 18:00)  POCT Blood Glucose.: 202 mg/dL (24 Jul 2020 12:31)  POCT Blood Glucose.: 69 mg/dL (24 Jul 2020 11:51)  POCT Blood Glucose.: 64 mg/dL (24 Jul 2020 11:49)    Lipid panel:       ABG - ( 25 Jul 2020 01:10 )  pH, Arterial: x     pH, Blood: 7.39  /  pCO2: 42    /  pO2: 198   / HCO3: 25    / Base Excess: 0.4   /  SaO2: 100                     RADIOLOGY & ADDITIONAL TESTS:

## 2020-07-25 NOTE — PROGRESS NOTE ADULT - SUBJECTIVE AND OBJECTIVE BOX
HPI:  60 yr old diagnosed of latent TB and started on Rifampin and Isoniazid 3 months ago while in [facility]. He came to [place] 2 weeks ago and the dgtr has noticed 20 pds wt loss, weakness, poor appetite, constantly thirsty, increased urination. They attributed it to the TB medications SE. They did take him to their PMD, who recommended that he go to ED. However patient passed out- slumped over while sitting in a chair and did not respond for couple of minutes. Upon shaking after he came awake and said he was fine. Dgtr brought him to ED with concerns.     In ED noted severe hyperglycemia and not in DKA. IVF and Insulin given. (08 Jul 2020 16:55)      24 hr events:      ## ROS:  [ ] unable to obtain  CONSTITUTIONAL: No fever, weight loss, or fatigue  EYES: No eye pain, visual disturbances, or discharge  ENMT:  No difficulty hearing, tinnitus, vertigo; No sinus or throat pain  NECK: No pain or stiffness  RESPIRATORY: No cough, wheezing, chills or hemoptysis; No shortness of breath  CARDIOVASCULAR: No chest pain, palpitations, dizziness, or leg swelling  GASTROINTESTINAL: No abdominal or epigastric pain. No nausea, vomiting, or hematemesis; No diarrhea or constipation. No melena or hematochezia.  GENITOURINARY: No dysuria, frequency, hematuria, or incontinence  NEUROLOGICAL: No headaches, memory loss, loss of strength, numbness, or tremors  SKIN: No itching, burning, rashes, or lesions   LYMPH NODES: No enlarged glands  ENDOCRINE: No heat or cold intolerance; No hair loss  MUSCULOSKELETAL: No joint pain or swelling; No muscle, back, or extremity pain  PSYCHIATRIC: No depression, anxiety, mood swings, or difficulty sleeping  HEME/LYMPH: No easy bruising, or bleeding gums  ALLERGY AND IMMUNOLOGIC: No hives or eczema    ## Labs:  CBC:                        12.1   5.25  )-----------( 347      ( 25 Jul 2020 04:23 )             36.2     Chem:  07-25    135  |  102  |  15  ----------------------------<  248<H>  4.6   |  28  |  0.84    Ca    8.7      25 Jul 2020 04:23  Phos  3.8     07-25  Mg     2.2     07-25    TPro  7.5  /  Alb  3.1<L>  /  TBili  0.7  /  DBili  x   /  AST  50<H>  /  ALT  43  /  AlkPhos  78  07-25    Coags:          ## Imaging:    ## Medications:    midodrine 5 milliGRAM(s) Oral every 8 hours      dextrose 40% Gel 15 Gram(s) Oral once PRN  dextrose 50% Injectable 12.5 Gram(s) IV Push once  dextrose 50% Injectable 25 Gram(s) IV Push once  dextrose 50% Injectable 25 Gram(s) IV Push once  glucagon  Injectable 1 milliGRAM(s) IntraMuscular once PRN  insulin glargine Injectable (LANTUS) 6 Unit(s) SubCutaneous at bedtime  insulin lispro (HumaLOG) corrective regimen sliding scale   SubCutaneous three times a day before meals  insulin lispro Injectable (HumaLOG) 2 Unit(s) SubCutaneous three times a day before meals    enoxaparin Injectable 40 milliGRAM(s) SubCutaneous daily      acetaminophen   Tablet .. 650 milliGRAM(s) Oral every 6 hours PRN      ## Vitals:  T(C): 36.9 (07-25-20 @ 14:45), Max: 36.9 (07-25-20 @ 14:45)  HR: 73 (07-25-20 @ 14:45) (58 - 93)  BP: 110/55 (07-25-20 @ 14:45) (76/48 - 128/68)  BP(mean): 101 (07-25-20 @ 11:30) (55 - 101)  RR: 16 (07-25-20 @ 14:45) (12 - 21)  SpO2: 97% (07-25-20 @ 14:45) (90% - 100%)  Wt(kg): --  Vent:   ABG: ABG - ( 25 Jul 2020 01:10 )  pH, Arterial: x     pH, Blood: 7.39  /  pCO2: 42    /  pO2: 198   / HCO3: 25    / Base Excess: 0.4   /  SaO2: 100                   07-24 @ 07:01  -  07-25 @ 07:00  --------------------------------------------------------  IN: 0 mL / OUT: 550 mL / NET: -550 mL    07-25 @ 07:01  - 07-25 @ 17:05  --------------------------------------------------------  IN: 0 mL / OUT: 250 mL / NET: -250 mL          ## P/E:  Gen: lying comfortably in bed in no apparent distress  HEENT: PERRL, EOMI  Resp: CTA B/L no c/r/w  CVS: S1S2 no m/r/g  Abd: soft NT/ND +BS  Ext: no c/c/e  Neuro: A&Ox3    CENTRAL LINE: [ ] YES [ ] NO  LOCATION:   DATE INSERTED:  REMOVE: [ ] YES [ ] NO      WILSON: [ ] YES [ ] NO    DATE INSERTED:  REMOVE:  [ ] YES [ ] NO      A-LINE:  [ ] YES [ ] NO  LOCATION:   DATE INSERTED:  REMOVE:  [ ] YES [ ] NO  EXPLAIN:    GLOBAL ISSUE/BEST PRACTICE:  Analgesia:  Sedation:  HOB elevation: yes  Stress ulcer prophylaxis:  VTE prophylaxis:  Oral Care:  Glycemic control:  Nutrition:    CODE STATUS: [ ] full code  [ ] DNR  [ ] DNI  [ ] [facility]  Goals of care discussion: [ ] yes HPI:  60M PMH latent TB and started on Rifampin and Isoniazid 3 months ago while in [facility]. He came to [place] 2 weeks ago and the daughter noticed 20 pound wt loss, weakness, poor appetite, Pt also constantly thirsty with increased urination. Pt presents to ED s/p syncopal episode. Found to be hyperglycemic with blood sugar 600s without anion gap. Admitted for newly diagnosed DM with HbA1C 10.6. Work up of syncope and pt's weight loss with RUL opacities on CT imaging of lungs. Sputum with 1 sample growing mycobacterium but not MTB or ARELY species. Pt remains in isolation. Underwent a bronchoscopy with biopsy with CT surgery 7/24.    During induction pt noted to briefly go into a-fib but spontaneously converted back to sinus rhythm. Was given popofol and fentanyl for induction of procedure Pt during procedure with hypotension SBP 60s requiring push of phenylephrine Post procedure SBP 90s. Pt extubated without difficulty and without respiratory distress. ICU consult requested by hospitalist for post op monitoring in ICU.      24 hr events:  pt awake, alert, responsive  no events overnight  SBP 90-120s, fully mentating  complaining of sore throat post procedure  + cough with gray sputum production  denies hemoptysis    ## ROS:  no fever, no chills, no sweats  no HA, no dizziness  no SOB  + productive cough with gray sputum  no hemoptysis  + sore throat  no CP, no palpitations  no abdominal pain, no nausea, no emesis  no dysuria      ## Labs:  CBC:                        12.1   5.25  )-----------( 347      ( 25 Jul 2020 04:23 )             36.2     Chem:  07-25    135  |  102  |  15  ----------------------------<  248<H>  4.6   |  28  |  0.84    Ca    8.7      25 Jul 2020 04:23  Phos  3.8     07-25  Mg     2.2     07-25    TPro  7.5  /  Alb  3.1<L>  /  TBili  0.7  /  AST  50<H>  /  ALT  43  /  AlkPhos  78  07-25    Culture - Acid Fast - Sputum w/Smear (07.09.20 @ 15:03)    Acid Fast Bacilli Smear: No acid fast bacilli seen by fluorochrome stain    Culture - Acid Fast - Sputum w/Smear (07.10.20 @ 19:26)    Acid Fast Bacilli Smear: No acid fast bacilli seen by fluorochrome stain    Culture - Acid Fast - Sputum w/Smear (07.11.20 @ 19:05)    Acid Fast Bacilli Smear: No acid fast bacilli seen by fluorochrome stain    Culture - Acid Fast - Sputum w/Smear (07.09.20 @ 15:03)    Specimen Source: .Sputum Sputum conical    Culture Results:   Growth of acid fast bacilli detected in broth, identification to follow.  resembles Rapidly growing Mycobacterium species  Presumptively not M.tuberculosis complex by DNA probe  Presumptively not M. Avium complex by DNA probe      ## Medications:    midodrine 5 milliGRAM(s) Oral every 8 hours      dextrose 40% Gel 15 Gram(s) Oral once PRN  dextrose 50% Injectable 12.5 Gram(s) IV Push once  dextrose 50% Injectable 25 Gram(s) IV Push once  dextrose 50% Injectable 25 Gram(s) IV Push once  glucagon  Injectable 1 milliGRAM(s) IntraMuscular once PRN  insulin glargine Injectable (LANTUS) 6 Unit(s) SubCutaneous at bedtime  insulin lispro (HumaLOG) corrective regimen sliding scale   SubCutaneous three times a day before meals  insulin lispro Injectable (HumaLOG) 2 Unit(s) SubCutaneous three times a day before meals    enoxaparin Injectable 40 milliGRAM(s) SubCutaneous daily      acetaminophen   Tablet .. 650 milliGRAM(s) Oral every 6 hours PRN      ## Vitals:  T(C): 36.9 (07-25-20 @ 14:45), Max: 36.9 (07-25-20 @ 14:45)  HR: 73 (07-25-20 @ 14:45) (58 - 93)  BP: 110/55 (07-25-20 @ 14:45) (76/48 - 128/68)  BP(mean): 101 (07-25-20 @ 11:30) (55 - 101)  RR: 16 (07-25-20 @ 14:45) (12 - 21)  SpO2: 97% (07-25-20 @ 14:45) (90% - 100%)    ABG: ABG - ( 25 Jul 2020 01:10 )  pH, Arterial:   pH, Blood: 7.39  /  pCO2: 42    /  pO2: 198   / HCO3: 25    / Base Excess: 0.4   /  SaO2: 100           07-24 @ 07:01 - 07-25 @ 07:00  --------------------------------------------------------  IN: 0 mL / OUT: 550 mL / NET: -550 mL    07-25 @ 07:01 - 07-25 @ 17:05  --------------------------------------------------------  IN: 0 mL / OUT: 250 mL / NET: -250 mL          ## P/E:  Gen: thin, cachectic male, lying comfortably in bed in no apparent distress  HEENT: PERRL, EOMI, no tonsilar exudates, no erythema of posterior oropharynx, neck supple  Resp: CTA B/L   CVS: RRR  Abd: soft NT/ND +BS  Ext: no c/c/e  Neuro: A&Ox3      CODE STATUS: [x] full code  [ ] DNR  [ ] DNI  [ ] [facility]  Goals of care discussion: [ ] yes

## 2020-07-25 NOTE — CHART NOTE - NSCHARTNOTEFT_GEN_A_CORE
Pt transferred to ICU post op s/p bronchoscopy with biopsy for hemodynamic monitoring. Pt had brief episode of a-fib during induction and self converted back to sinus rhythm. Borderline hypotension SBP 90s.    Pt awake alert responsive  Low blood pressure during procedure likely related to sedatives used during procedure (fentanyl and propofol).    SBP since transfer to ICU 90s-120s. Had 1 read of SBP 70s with repeat in the 90s -100 this morning. Pt has been overall maintaining MAP of 65 with SBP 90s    will give midodrine for additional support but can likely wean off relatively quickly    Stable for transfer back to medical service.

## 2020-07-25 NOTE — PROGRESS NOTE ADULT - ASSESSMENT
60M from [facility] with PMH latent TB on treatment here with newly diagnosed DM with hyperglycemia, weight loss, RUL opacities with sputum culture growing mycobacterium but presumptively not MTB or ARELY. s/p bronchoscopy with lavage and biopsy. Intra operatively with brief episode of a-fib per report and hypotension. Transferred to ICU post op extubated on room air for hemodynamic monitoring.    - no events overnight  - remains in sinus rhythm  - SBP 90s-120s with MAP in the 60s overall  - pt mentating, fully awake alert and oriented  - suspect hypotension in part due to pharmacologic effects of anesthetics during procedure  - place on midodrine however likely can wean off soon as BP remains stable  - sore throat likely due to procedure: Cepacol for throat discomfort  - follow up bronch bx and culture and AFB stain  - cont sliding scale coverage with lantus and pre meal insulin for underlying DM  - stable for transfer back to medical service

## 2020-07-25 NOTE — PROGRESS NOTE ADULT - SUBJECTIVE AND OBJECTIVE BOX
HPI:  60 yr old diagnosed of latent TB and started on Rifampin and Isoniazid 3 months ago while in [facility]. He came to [place] 2 weeks ago and the dgtr has noticed 20 pds wt loss, weakness, poor appetite, constantly thirsty, increased urination. They attributed it to the TB medications SE. They did take him to their PMD, who recommended that he go to ED. However patient passed out- slumped over while sitting in a chair and did not respond for couple of minutes. Upon shaking after he came awake and said he was fine. Dgtr brought him to ED with concerns.     In ED noted severe hyperglycemia and not in DKA. IVF and Insulin given. (08 Jul 2020 16:55)      Allergies    No Known Allergies    Intolerances        MEDICATIONS  (STANDING):  benzocaine 15 mG/menthol 3.6 mG (Sugar-Free) Lozenge 1 Lozenge Oral four times a day  dextrose 5%. 1000 milliLiter(s) (50 mL/Hr) IV Continuous <Continuous>  dextrose 50% Injectable 12.5 Gram(s) IV Push once  dextrose 50% Injectable 25 Gram(s) IV Push once  dextrose 50% Injectable 25 Gram(s) IV Push once  enoxaparin Injectable 40 milliGRAM(s) SubCutaneous daily  insulin glargine Injectable (LANTUS) 6 Unit(s) SubCutaneous at bedtime  insulin lispro (HumaLOG) corrective regimen sliding scale   SubCutaneous three times a day before meals  insulin lispro Injectable (HumaLOG) 2 Unit(s) SubCutaneous three times a day before meals  midodrine 5 milliGRAM(s) Oral every 8 hours    MEDICATIONS  (PRN):  acetaminophen   Tablet .. 650 milliGRAM(s) Oral every 6 hours PRN Moderate Pain (4 - 6)  dextrose 40% Gel 15 Gram(s) Oral once PRN Blood Glucose LESS THAN 70 milliGRAM(s)/deciliter  glucagon  Injectable 1 milliGRAM(s) IntraMuscular once PRN Glucose LESS THAN 70 milligrams/deciliter      REVIEW OF SYSTEMS:    CONSTITUTIONAL: No fever, chills, weight loss, or fatigue  HEENT: No sore throat, runny nose, ear ache  RESPIRATORY: No cough, wheezing, No shortness of breath  CARDIOVASCULAR: No chest pain, palpitations, dizziness  GASTROINTESTINAL: No abdominal pain. No nausea, vomiting, diarrhea  GENITOURINARY: No dysuria, increase frequency, hematuria, or incontinence  NEUROLOGICAL: No headaches, memory loss, loss of strength, numbness, or tremors, no weakness  EXTREMITY: No pedal edema BLE  SKIN: No itching, burning, rashes, or lesions     VITAL SIGNS:  T(C): 36.8 (07-25-20 @ 17:23), Max: 36.9 (07-25-20 @ 14:45)  T(F): 98.2 (07-25-20 @ 17:23), Max: 98.4 (07-25-20 @ 14:45)  HR: 71 (07-25-20 @ 18:25) (58 - 85)  BP: 126/66 (07-25-20 @ 18:25) (76/48 - 128/68)  RR: 17 (07-25-20 @ 18:25) (13 - 21)  SpO2: 97% (07-25-20 @ 18:25) (97% - 100%)  Wt(kg): --    PHYSICAL EXAM:    GENERAL: not in any distress  HEENT: Neck is supple, normocephalic, atraumatic   CHEST/LUNG: Clear to auscultation bilaterally; No rales, rhonchi, wheezing  HEART: Regular rate and rhythm; No murmurs, rubs, or gallops  ABDOMEN: Soft, Nontender, Nondistended; Bowel sounds present, no rebound   EXTREMITIES:  2+ Peripheral Pulses, No clubbing, cyanosis, or edema  GENITOURINARY:   SKIN: No rashes or lesions  BACK: no pressor sore   NERVOUS SYSTEM:  Alert & Oriented X3, Good concentration  PSYCH: normal affect     LABS:                         12.1   5.25  )-----------( 347      ( 25 Jul 2020 04:23 )             36.2     07-25    135  |  102  |  15  ----------------------------<  248<H>  4.6   |  28  |  0.84    Ca    8.7      25 Jul 2020 04:23  Phos  3.8     07-25  Mg     2.2     07-25    TPro  7.5  /  Alb  3.1<L>  /  TBili  0.7  /  DBili  x   /  AST  50<H>  /  ALT  43  /  AlkPhos  78  07-25    LIVER FUNCTIONS - ( 25 Jul 2020 04:23 )  Alb: 3.1 g/dL / Pro: 7.5 gm/dL / ALK PHOS: 78 U/L / ALT: 43 U/L / AST: 50 U/L / GGT: x               ABG - ( 25 Jul 2020 01:10 )  pH, Arterial: x     pH, Blood: 7.39  /  pCO2: 42    /  pO2: 198   / HCO3: 25    / Base Excess: 0.4   /  SaO2: 100                                 Culture Results:   No growth (07-25 @ 00:55)                Radiology: HPI:  60 yr old diagnosed of latent TB and started on Rifampin and Isoniazid 3 months ago while in [facility]. He came to [place] 2 weeks ago and the dgtr has noticed 20 pds wt loss, weakness, poor appetite, constantly thirsty, increased urination. They attributed it to the TB medications SE. They did take him to their PMD, who recommended that he go to ED. However patient passed out- slumped over while sitting in a chair and did not respond for couple of minutes. Upon shaking after he came awake and said he was fine. Dgtr brought him to ED with concerns.     In ED noted severe hyperglycemia and not in DKA. IVF and Insulin given. (08 Jul 2020 16:55)  so far non MTB growing in spuum  sp bx  co sore throat    Allergies    No Known Allergies    Intolerances        MEDICATIONS  (STANDING):  benzocaine 15 mG/menthol 3.6 mG (Sugar-Free) Lozenge 1 Lozenge Oral four times a day  dextrose 5%. 1000 milliLiter(s) (50 mL/Hr) IV Continuous <Continuous>  dextrose 50% Injectable 12.5 Gram(s) IV Push once  dextrose 50% Injectable 25 Gram(s) IV Push once  dextrose 50% Injectable 25 Gram(s) IV Push once  enoxaparin Injectable 40 milliGRAM(s) SubCutaneous daily  insulin glargine Injectable (LANTUS) 6 Unit(s) SubCutaneous at bedtime  insulin lispro (HumaLOG) corrective regimen sliding scale   SubCutaneous three times a day before meals  insulin lispro Injectable (HumaLOG) 2 Unit(s) SubCutaneous three times a day before meals  midodrine 5 milliGRAM(s) Oral every 8 hours    MEDICATIONS  (PRN):  acetaminophen   Tablet .. 650 milliGRAM(s) Oral every 6 hours PRN Moderate Pain (4 - 6)  dextrose 40% Gel 15 Gram(s) Oral once PRN Blood Glucose LESS THAN 70 milliGRAM(s)/deciliter  glucagon  Injectable 1 milliGRAM(s) IntraMuscular once PRN Glucose LESS THAN 70 milligrams/deciliter      REVIEW OF SYSTEMS:    throat is hurting   cough plus more today   VITAL SIGNS:  T(C): 36.8 (07-25-20 @ 17:23), Max: 36.9 (07-25-20 @ 14:45)  T(F): 98.2 (07-25-20 @ 17:23), Max: 98.4 (07-25-20 @ 14:45)  HR: 71 (07-25-20 @ 18:25) (58 - 85)  BP: 126/66 (07-25-20 @ 18:25) (76/48 - 128/68)  RR: 17 (07-25-20 @ 18:25) (13 - 21)  SpO2: 97% (07-25-20 @ 18:25) (97% - 100%)  Wt(kg): --    PHYSICAL EXAM:    GENERAL: not in any distress  HEENT: Neck is supple, normocephalic, atraumatic   CHEST/LUNG: Clear to auscultation bilaterally; No rales, rhonchi, wheezing  HEART: Regular rate and rhythm; No murmurs, rubs, or gallops  ABDOMEN: Soft, Nontender, Nondistended; Bowel sounds present, no rebound   EXTREMITIES:  2+ Peripheral Pulses, No clubbing, cyanosis, or edema  SKIN: No rashes or lesions  BACK: no pressor sore   NERVOUS SYSTEM:  Alert & Oriented X3, Good concentration  PSYCH: normal affect     LABS:                         12.1   5.25  )-----------( 347      ( 25 Jul 2020 04:23 )             36.2     07-25    135  |  102  |  15  ----------------------------<  248<H>  4.6   |  28  |  0.84    Ca    8.7      25 Jul 2020 04:23  Phos  3.8     07-25  Mg     2.2     07-25    TPro  7.5  /  Alb  3.1<L>  /  TBili  0.7  /  DBili  x   /  AST  50<H>  /  ALT  43  /  AlkPhos  78  07-25    LIVER FUNCTIONS - ( 25 Jul 2020 04:23 )  Alb: 3.1 g/dL / Pro: 7.5 gm/dL / ALK PHOS: 78 U/L / ALT: 43 U/L / AST: 50 U/L / GGT: x               ABG - ( 25 Jul 2020 01:10 )  pH, Arterial: x     pH, Blood: 7.39  /  pCO2: 42    /  pO2: 198   / HCO3: 25    / Base Excess: 0.4   /  SaO2: 100                                 Culture Results:   No growth (07-25 @ 00:55)                Radiology:

## 2020-07-25 NOTE — CHART NOTE - NSCHARTNOTEFT_GEN_A_CORE
HPI:  60 yr old diagnosed of latent TB and started on Rifampin and Isoniazid 3 months ago while in [facility]. He came to [place] 2 weeks ago and the dgtr has noticed 20 pds wt loss, weakness, poor appetite, constantly thirsty, increased urination. They attributed it to the TB medications SE. They did take him to their PMD, who recommended that he go to ED. However patient passed out- slumped over while sitting in a chair and did not respond for couple of minutes. Upon shaking after he came awake and said he was fine. Dgtr brought him to ED with concerns. In ED noted severe hyperglycemia and not in DKA. IVF and Insulin given. (08 Jul 2020 16:55)  [place]ives in [place], went to [facility] in January in January but could not return due to COVID. Pt.  had cough and fever in April which improved after getting started on INH+ Rifampin. Patient HIV negative and COVID negative x 2 this admission  07/24/20: Bronchoscopy with BAL and biopsy after + Quantiferon , JOHANNE made aware AFB culture positive NOT ARELY NOT MDR TB. Post operative systolic  blood pressure < 100 medicine requesting ICU Consult Post operative hypotension- LR and monitoring.  Patient stable, Final identification for AFB awaited. Patient seen and examined by ICU attending and stable for transfer back to medicine service.     Report given to Dr. Tripathi, hospitalist service     MICHAELLE Bonilla  critical care

## 2020-07-26 LAB
ALBUMIN SERPL ELPH-MCNC: 3 G/DL — LOW (ref 3.3–5)
ALP SERPL-CCNC: 83 U/L — SIGNIFICANT CHANGE UP (ref 40–120)
ALT FLD-CCNC: 39 U/L — SIGNIFICANT CHANGE UP (ref 12–78)
ANION GAP SERPL CALC-SCNC: 8 MMOL/L — SIGNIFICANT CHANGE UP (ref 5–17)
AST SERPL-CCNC: 44 U/L — HIGH (ref 15–37)
BILIRUB SERPL-MCNC: 0.8 MG/DL — SIGNIFICANT CHANGE UP (ref 0.2–1.2)
BUN SERPL-MCNC: 13 MG/DL — SIGNIFICANT CHANGE UP (ref 7–23)
CALCIUM SERPL-MCNC: 8.6 MG/DL — SIGNIFICANT CHANGE UP (ref 8.5–10.1)
CHLORIDE SERPL-SCNC: 102 MMOL/L — SIGNIFICANT CHANGE UP (ref 96–108)
CO2 SERPL-SCNC: 27 MMOL/L — SIGNIFICANT CHANGE UP (ref 22–31)
CREAT SERPL-MCNC: 0.79 MG/DL — SIGNIFICANT CHANGE UP (ref 0.5–1.3)
GLUCOSE BLDC GLUCOMTR-MCNC: 168 MG/DL — HIGH (ref 70–99)
GLUCOSE BLDC GLUCOMTR-MCNC: 175 MG/DL — HIGH (ref 70–99)
GLUCOSE BLDC GLUCOMTR-MCNC: 212 MG/DL — HIGH (ref 70–99)
GLUCOSE BLDC GLUCOMTR-MCNC: 292 MG/DL — HIGH (ref 70–99)
GLUCOSE SERPL-MCNC: 164 MG/DL — HIGH (ref 70–99)
HCT VFR BLD CALC: 34.6 % — LOW (ref 39–50)
HGB BLD-MCNC: 11.9 G/DL — LOW (ref 13–17)
MAGNESIUM SERPL-MCNC: 2.3 MG/DL — SIGNIFICANT CHANGE UP (ref 1.6–2.6)
MCHC RBC-ENTMCNC: 23.5 PG — LOW (ref 27–34)
MCHC RBC-ENTMCNC: 34.4 GM/DL — SIGNIFICANT CHANGE UP (ref 32–36)
MCV RBC AUTO: 68.4 FL — LOW (ref 80–100)
NRBC # BLD: 0 /100 WBCS — SIGNIFICANT CHANGE UP (ref 0–0)
PHOSPHATE SERPL-MCNC: 3 MG/DL — SIGNIFICANT CHANGE UP (ref 2.5–4.5)
PLATELET # BLD AUTO: 315 K/UL — SIGNIFICANT CHANGE UP (ref 150–400)
POTASSIUM SERPL-MCNC: 3.9 MMOL/L — SIGNIFICANT CHANGE UP (ref 3.5–5.3)
POTASSIUM SERPL-SCNC: 3.9 MMOL/L — SIGNIFICANT CHANGE UP (ref 3.5–5.3)
PROT SERPL-MCNC: 7.5 GM/DL — SIGNIFICANT CHANGE UP (ref 6–8.3)
RBC # BLD: 5.06 M/UL — SIGNIFICANT CHANGE UP (ref 4.2–5.8)
RBC # FLD: 18.6 % — HIGH (ref 10.3–14.5)
SODIUM SERPL-SCNC: 137 MMOL/L — SIGNIFICANT CHANGE UP (ref 135–145)
WBC # BLD: 8.68 K/UL — SIGNIFICANT CHANGE UP (ref 3.8–10.5)
WBC # FLD AUTO: 8.68 K/UL — SIGNIFICANT CHANGE UP (ref 3.8–10.5)

## 2020-07-26 PROCEDURE — 99233 SBSQ HOSP IP/OBS HIGH 50: CPT

## 2020-07-26 RX ADMIN — Medication 650 MILLIGRAM(S): at 09:13

## 2020-07-26 RX ADMIN — Medication 650 MILLIGRAM(S): at 19:14

## 2020-07-26 RX ADMIN — BENZOCAINE AND MENTHOL 1 LOZENGE: 5; 1 LIQUID ORAL at 16:56

## 2020-07-26 RX ADMIN — MIDODRINE HYDROCHLORIDE 5 MILLIGRAM(S): 2.5 TABLET ORAL at 22:49

## 2020-07-26 RX ADMIN — Medication 1: at 16:55

## 2020-07-26 RX ADMIN — Medication 5: at 11:55

## 2020-07-26 RX ADMIN — MIDODRINE HYDROCHLORIDE 5 MILLIGRAM(S): 2.5 TABLET ORAL at 13:33

## 2020-07-26 RX ADMIN — BENZOCAINE AND MENTHOL 1 LOZENGE: 5; 1 LIQUID ORAL at 05:02

## 2020-07-26 RX ADMIN — Medication 2 UNIT(S): at 16:55

## 2020-07-26 RX ADMIN — MIDODRINE HYDROCHLORIDE 5 MILLIGRAM(S): 2.5 TABLET ORAL at 05:02

## 2020-07-26 RX ADMIN — Medication 1: at 08:29

## 2020-07-26 RX ADMIN — ENOXAPARIN SODIUM 40 MILLIGRAM(S): 100 INJECTION SUBCUTANEOUS at 11:56

## 2020-07-26 RX ADMIN — INSULIN GLARGINE 6 UNIT(S): 100 INJECTION, SOLUTION SUBCUTANEOUS at 22:49

## 2020-07-26 RX ADMIN — Medication 2 UNIT(S): at 08:28

## 2020-07-26 RX ADMIN — BENZOCAINE AND MENTHOL 1 LOZENGE: 5; 1 LIQUID ORAL at 11:56

## 2020-07-26 RX ADMIN — Medication 650 MILLIGRAM(S): at 10:13

## 2020-07-26 RX ADMIN — Medication 650 MILLIGRAM(S): at 18:14

## 2020-07-26 RX ADMIN — Medication 2 UNIT(S): at 11:55

## 2020-07-26 NOTE — PROGRESS NOTE ADULT - SUBJECTIVE AND OBJECTIVE BOX
I inherited patient fro first time on today 7/26/2020    No overnight events.   no fever  no diarrhea  no chest pain  no shortness of breath at rest.       MEDICATIONS  (STANDING):  benzocaine 15 mG/menthol 3.6 mG (Sugar-Free) Lozenge 1 Lozenge Oral four times a day  dextrose 5%. 1000 milliLiter(s) (50 mL/Hr) IV Continuous <Continuous>  dextrose 50% Injectable 12.5 Gram(s) IV Push once  dextrose 50% Injectable 25 Gram(s) IV Push once  dextrose 50% Injectable 25 Gram(s) IV Push once  enoxaparin Injectable 40 milliGRAM(s) SubCutaneous daily  insulin glargine Injectable (LANTUS) 6 Unit(s) SubCutaneous at bedtime  insulin lispro (HumaLOG) corrective regimen sliding scale   SubCutaneous three times a day before meals  insulin lispro Injectable (HumaLOG) 2 Unit(s) SubCutaneous three times a day before meals  midodrine 5 milliGRAM(s) Oral every 8 hours    MEDICATIONS  (PRN):  acetaminophen   Tablet .. 650 milliGRAM(s) Oral every 6 hours PRN Moderate Pain (4 - 6)  dextrose 40% Gel 15 Gram(s) Oral once PRN Blood Glucose LESS THAN 70 milliGRAM(s)/deciliter  glucagon  Injectable 1 milliGRAM(s) IntraMuscular once PRN Glucose LESS THAN 70 milligrams/deciliter    Vital Signs Last 24 Hrs  T(C): 37.1 (26 Jul 2020 05:25), Max: 37.1 (26 Jul 2020 05:25)  T(F): 98.7 (26 Jul 2020 05:25), Max: 98.7 (26 Jul 2020 05:25)  HR: 76 (26 Jul 2020 05:25) (69 - 83)  BP: 109/61 (26 Jul 2020 05:25) (102/63 - 128/68)  BP(mean): --  RR: 18 (26 Jul 2020 05:25) (16 - 18)  SpO2: 100% (26 Jul 2020 05:25) (97% - 100%)    PHYSICAL EXAM:    GENERAL: Thinly built. no Acute distress  CHEST/LUNG: Clear to ausculation bilaterally, no wheezing, no crackles   HEART: Regular rate and rhythm; No murmurs, rubs  ABDOMEN: Soft, Nontender, Nondistended; Bowel sounds present  EXTREMITIES:  Moving all four extremities spontaneously, No clubbing, cyanosis, or edema  NERVOUS SYSTEM:  Grossly non focal.  Psychiatry: AAO x 3, mood is appropriate       LABS:                                     11.9   8.68  )-----------( 315      ( 26 Jul 2020 06:47 )             34.6   07-26    137  |  102  |  13  ----------------------------<  164<H>  3.9   |  27  |  0.79    Ca    8.6      26 Jul 2020 06:47  Phos  3.0     07-26  Mg     2.3     07-26    TPro  7.5  /  Alb  3.0<L>  /  TBili  0.8  /  DBili  x   /  AST  44<H>  /  ALT  39  /  AlkPhos  83  07-26        CAPILLARY BLOOD GLUCOSE      POCT Blood Glucose.: 168 mg/dL (26 Jul 2020 07:58)  POCT Blood Glucose.: 172 mg/dL (25 Jul 2020 22:18)  POCT Blood Glucose.: 231 mg/dL (25 Jul 2020 18:14)

## 2020-07-26 NOTE — PROGRESS NOTE ADULT - SUBJECTIVE AND OBJECTIVE BOX
Patient is a 60y old  Male who presents with a chief complaint of tb (25 Jul 2020 22:14)      INTERVAL HPI/OVERNIGHT EVENTS:  pt NAD  sitting up in bed    MEDICATIONS  (STANDING):  benzocaine 15 mG/menthol 3.6 mG (Sugar-Free) Lozenge 1 Lozenge Oral four times a day  dextrose 5%. 1000 milliLiter(s) (50 mL/Hr) IV Continuous <Continuous>  dextrose 50% Injectable 12.5 Gram(s) IV Push once  dextrose 50% Injectable 25 Gram(s) IV Push once  dextrose 50% Injectable 25 Gram(s) IV Push once  enoxaparin Injectable 40 milliGRAM(s) SubCutaneous daily  insulin glargine Injectable (LANTUS) 6 Unit(s) SubCutaneous at bedtime  insulin lispro (HumaLOG) corrective regimen sliding scale   SubCutaneous three times a day before meals  insulin lispro Injectable (HumaLOG) 2 Unit(s) SubCutaneous three times a day before meals  midodrine 5 milliGRAM(s) Oral every 8 hours    MEDICATIONS  (PRN):  acetaminophen   Tablet .. 650 milliGRAM(s) Oral every 6 hours PRN Moderate Pain (4 - 6)  dextrose 40% Gel 15 Gram(s) Oral once PRN Blood Glucose LESS THAN 70 milliGRAM(s)/deciliter  glucagon  Injectable 1 milliGRAM(s) IntraMuscular once PRN Glucose LESS THAN 70 milligrams/deciliter      REVIEW OF SYSTEMS:  CONSTITUTIONAL: No fever, weight loss, or fatigue  RESPIRATORY: No cough, wheezing, chills or hemoptysis; No shortness of breath  CARDIOVASCULAR: No chest pain, palpitations, dizziness, or leg swelling  GASTROINTESTINAL: No abdominal or epigastric pain. No nausea, vomiting, or hematemesis; No diarrhea or constipation. No melena or hematochezia.  ENDOCRINE: No heat or cold intolerance; No hair loss      Vital Signs Last 24 Hrs  T(C): 37.1 (26 Jul 2020 05:25), Max: 37.1 (26 Jul 2020 05:25)  T(F): 98.7 (26 Jul 2020 05:25), Max: 98.7 (26 Jul 2020 05:25)  HR: 76 (26 Jul 2020 05:25) (69 - 85)  BP: 109/61 (26 Jul 2020 05:25) (76/48 - 128/68)  BP(mean): 101 (25 Jul 2020 11:30) (55 - 101)  RR: 18 (26 Jul 2020 05:25) (14 - 21)  SpO2: 100% (26 Jul 2020 05:25) (97% - 100%)    PHYSICAL EXAM:  GENERAL: NAD      LABS:                        11.9   8.68  )-----------( 315      ( 26 Jul 2020 06:47 )             34.6     07-26    137  |  102  |  13  ----------------------------<  164<H>  3.9   |  27  |  0.79    Ca    8.6      26 Jul 2020 06:47  Phos  3.0     07-26  Mg     2.3     07-26    TPro  7.5  /  Alb  3.0<L>  /  TBili  0.8  /  DBili  x   /  AST  44<H>  /  ALT  39  /  AlkPhos  83  07-26        CAPILLARY BLOOD GLUCOSE      POCT Blood Glucose.: 168 mg/dL (26 Jul 2020 07:58)  POCT Blood Glucose.: 172 mg/dL (25 Jul 2020 22:18)  POCT Blood Glucose.: 231 mg/dL (25 Jul 2020 18:14)  POCT Blood Glucose.: 244 mg/dL (25 Jul 2020 11:16)    Lipid panel:       ABG - ( 25 Jul 2020 01:10 )  pH, Arterial: x     pH, Blood: 7.39  /  pCO2: 42    /  pO2: 198   / HCO3: 25    / Base Excess: 0.4   /  SaO2: 100                     RADIOLOGY & ADDITIONAL TESTS:

## 2020-07-26 NOTE — PROGRESS NOTE ADULT - ASSESSMENT
60 yr old with Latent TB from [facility] with wt loss, constant thirst, poor appetite, weakness and increased urination .    (I used creole speaking audio interpretor daily for my communication with the patient since 7/15/20)    R/O Pulm TB:  - H/O latent TB on meds since march  - As per daughter no H/o COVID-19, Neg Ab  - CT with rt upper lobe infiltrate  - Now on rifampin, and INH regime, held now per ID prior bronch  - Sputum cx (broth) showed AFB + bacilli 1 of 3  presumed not ARELY or MTb. per ID note on 7/25/2020 will need to f/u JOHANNE  - Positive QuantiFeron, Neg HIV  -s/p  Bronch on Friday xfer to ICU for monitoring  downgraded Sunday scheduled for today.  BAL no AFB ?????       Syncope on admisison  - Likely vasovagal   - In the setting of dehydration uncontrolled DM, viral illness  - PE ruled out  - CT head neg  - 2D echo with preserved EF, no acute path    DM type 2.  controlled blood sugars. cont current management.   - DKA ruled out  - Hb A1c 10.6  Endo on board.     Pseudohyponatremia: resolved       Severe protein-calorie malnutrition:  - Nutritional supplements.       MERCEDES:  - Resolved  - B/L non obstructing stone, outpt follow up     Headache. Cont prn tylenol.     Blurred vision. Needs outpatient Ophthalmologist follow up.    DVT ppx resume

## 2020-07-26 NOTE — PROGRESS NOTE ADULT - SUBJECTIVE AND OBJECTIVE BOX
INTERVAL HPI:  60 yr male, reports being  diagnosed of latent TB(not clear how) and started on Rifampin and Isoniazid 3 months ago while in [facility]. Reports had cough and fever in April.  [place] in [place], went to [facility] in January but could not return due to COVID.  Came to [place] 2 weeks ago,  daughter noticed 20 pound  wt loss, weakness, poor appetite, constantly thirsty, increased urination. They attributed it to the TB medications Took  him to  PMD, who recommended that he go to ED, passed out-( slumped over while sitting in a chair ) and did not respond for couple of minutes.  In ED noted severe hyperglycemia(new diagnosis of DM) but not in DKA. IVF and Insulin given. (08 Jul 2020 16:55).  AS above had cough and fever in April which improved after getting started on INH+ Rifampin.   Denied fever, chills,  cough , sputum production or night sweats.  07/24/20: Bronchoscopy with BAL and biopsy.    OVERNIGHT EVENTS:  Resting comfortably.    Vital Signs Last 24 Hrs  T(C): 36.9 (26 Jul 2020 11:53), Max: 37.1 (26 Jul 2020 05:25)  T(F): 98.4 (26 Jul 2020 11:53), Max: 98.7 (26 Jul 2020 05:25)  HR: 84 (26 Jul 2020 11:53) (69 - 84)  BP: 116/56 (26 Jul 2020 11:53) (102/63 - 126/66)  BP(mean): --  RR: 17 (26 Jul 2020 11:53) (17 - 18)  SpO2: 99% (26 Jul 2020 11:53) (97% - 100%)    PHYSICAL EXAM:  GEN:        Resting, comfortable.  HEENT:    Normal.    RESP:        no distress  CVS:          Regular rate and rhythm.     MEDICATIONS  (STANDING):  benzocaine 15 mG/menthol 3.6 mG (Sugar-Free) Lozenge 1 Lozenge Oral four times a day  dextrose 5%. 1000 milliLiter(s) (50 mL/Hr) IV Continuous <Continuous>  dextrose 50% Injectable 12.5 Gram(s) IV Push once  dextrose 50% Injectable 25 Gram(s) IV Push once  dextrose 50% Injectable 25 Gram(s) IV Push once  enoxaparin Injectable 40 milliGRAM(s) SubCutaneous daily  insulin glargine Injectable (LANTUS) 6 Unit(s) SubCutaneous at bedtime  insulin lispro (HumaLOG) corrective regimen sliding scale   SubCutaneous three times a day before meals  insulin lispro Injectable (HumaLOG) 2 Unit(s) SubCutaneous three times a day before meals  midodrine 5 milliGRAM(s) Oral every 8 hours    MEDICATIONS  (PRN):  acetaminophen   Tablet .. 650 milliGRAM(s) Oral every 6 hours PRN Moderate Pain (4 - 6)  dextrose 40% Gel 15 Gram(s) Oral once PRN Blood Glucose LESS THAN 70 milliGRAM(s)/deciliter  glucagon  Injectable 1 milliGRAM(s) IntraMuscular once PRN Glucose LESS THAN 70 milligrams/deciliter    LABS:                        11.9   8.68  )-----------( 315      ( 26 Jul 2020 06:47 )             34.6     07-26    137  |  102  |  13  ----------------------------<  164<H>  3.9   |  27  |  0.79    Ca    8.6      26 Jul 2020 06:47  Phos  3.0     07-26  Mg     2.3     07-26    TPro  7.5  /  Alb  3.0<L>  /  TBili  0.8  /  DBili  x   /  AST  44<H>  /  ALT  39  /  AlkPhos  83  07-26 07-25 @ 01:10  pH: 7.39  pCO2: 42  pO2: 198  SaO2: 100    ASSESSMENT AND PLAN:  ·	RUL opacities .  ·	AFB growing in one of  broth culture( not MTB or ARELY).  ·	Positive QuantiFeron Gold and weight loss.  ·	Newly diagnosed DM  ·	Hyperglycemia.    Was on INH+ Rifampin for about 4 months which were started in [facility] for presumably latent TB( had cough and fever at that time).  Admitted this time with new onset DM and weight loss. Chest CT with RUL opacities. One of the induced sputum grew AFB from broth culture( not PTB or ARELY) per microbiology. INH+ Rifampin stopped by ID to increase yield.  Sedrate less 5 x 2.    HIV negative.  ACE level normal.  DANIELLE, C-ANCA, P- ANCA  negative.  Hepatitis negative.  06/24/20: BAL smear negative for AFB.    Further management per JOHANNE recommendations.

## 2020-07-27 LAB
GLUCOSE BLDC GLUCOMTR-MCNC: 145 MG/DL — HIGH (ref 70–99)
GLUCOSE BLDC GLUCOMTR-MCNC: 189 MG/DL — HIGH (ref 70–99)
GLUCOSE BLDC GLUCOMTR-MCNC: 193 MG/DL — HIGH (ref 70–99)
GLUCOSE BLDC GLUCOMTR-MCNC: 222 MG/DL — HIGH (ref 70–99)

## 2020-07-27 PROCEDURE — 99232 SBSQ HOSP IP/OBS MODERATE 35: CPT

## 2020-07-27 RX ORDER — METFORMIN HYDROCHLORIDE 850 MG/1
500 TABLET ORAL
Refills: 0 | Status: DISCONTINUED | OUTPATIENT
Start: 2020-07-27 | End: 2020-07-30

## 2020-07-27 RX ADMIN — BENZOCAINE AND MENTHOL 1 LOZENGE: 5; 1 LIQUID ORAL at 17:29

## 2020-07-27 RX ADMIN — INSULIN GLARGINE 6 UNIT(S): 100 INJECTION, SOLUTION SUBCUTANEOUS at 22:11

## 2020-07-27 RX ADMIN — Medication 650 MILLIGRAM(S): at 17:29

## 2020-07-27 RX ADMIN — ENOXAPARIN SODIUM 40 MILLIGRAM(S): 100 INJECTION SUBCUTANEOUS at 11:34

## 2020-07-27 RX ADMIN — Medication 650 MILLIGRAM(S): at 18:39

## 2020-07-27 RX ADMIN — Medication 2 UNIT(S): at 11:35

## 2020-07-27 RX ADMIN — BENZOCAINE AND MENTHOL 1 LOZENGE: 5; 1 LIQUID ORAL at 05:05

## 2020-07-27 RX ADMIN — Medication 2 UNIT(S): at 16:39

## 2020-07-27 RX ADMIN — Medication 3: at 11:35

## 2020-07-27 RX ADMIN — Medication 1: at 16:40

## 2020-07-27 RX ADMIN — BENZOCAINE AND MENTHOL 1 LOZENGE: 5; 1 LIQUID ORAL at 11:34

## 2020-07-27 RX ADMIN — MIDODRINE HYDROCHLORIDE 5 MILLIGRAM(S): 2.5 TABLET ORAL at 16:39

## 2020-07-27 RX ADMIN — METFORMIN HYDROCHLORIDE 500 MILLIGRAM(S): 850 TABLET ORAL at 16:40

## 2020-07-27 RX ADMIN — Medication 2 UNIT(S): at 07:52

## 2020-07-27 RX ADMIN — BENZOCAINE AND MENTHOL 1 LOZENGE: 5; 1 LIQUID ORAL at 23:05

## 2020-07-27 RX ADMIN — MIDODRINE HYDROCHLORIDE 5 MILLIGRAM(S): 2.5 TABLET ORAL at 22:04

## 2020-07-27 RX ADMIN — MIDODRINE HYDROCHLORIDE 5 MILLIGRAM(S): 2.5 TABLET ORAL at 05:05

## 2020-07-27 NOTE — PROGRESS NOTE ADULT - SUBJECTIVE AND OBJECTIVE BOX
HPI:  60 yr old diagnosed of latent TB and started on Rifampin and Isoniazid 3 months ago while in [facility]. He came to [place] 2 weeks ago and the dgtr has noticed 20 pds wt loss, weakness, poor appetite, constantly thirsty, increased urination. They attributed it to the TB medications SE. They did take him to their PMD, who recommended that he go to ED. However patient passed out- slumped over while sitting in a chair and did not respond for couple of minutes. Upon shaking after he came awake and said he was fine. Dgtr brought him to ED with concerns.   In ED noted severe hyperglycemia and not in DKA. IVF and Insulin given. (08 Jul 2020 16:55)  work up so far not mtb     Allergies    No Known Allergies    Intolerances        MEDICATIONS  (STANDING):  benzocaine 15 mG/menthol 3.6 mG (Sugar-Free) Lozenge 1 Lozenge Oral four times a day  dextrose 5%. 1000 milliLiter(s) (50 mL/Hr) IV Continuous <Continuous>  dextrose 50% Injectable 12.5 Gram(s) IV Push once  dextrose 50% Injectable 25 Gram(s) IV Push once  dextrose 50% Injectable 25 Gram(s) IV Push once  enoxaparin Injectable 40 milliGRAM(s) SubCutaneous daily  insulin glargine Injectable (LANTUS) 6 Unit(s) SubCutaneous at bedtime  insulin lispro (HumaLOG) corrective regimen sliding scale   SubCutaneous three times a day before meals  insulin lispro Injectable (HumaLOG) 2 Unit(s) SubCutaneous three times a day before meals  metFORMIN 500 milliGRAM(s) Oral two times a day with meals  midodrine 5 milliGRAM(s) Oral every 8 hours    MEDICATIONS  (PRN):  acetaminophen   Tablet .. 650 milliGRAM(s) Oral every 6 hours PRN Moderate Pain (4 - 6)  dextrose 40% Gel 15 Gram(s) Oral once PRN Blood Glucose LESS THAN 70 milliGRAM(s)/deciliter  glucagon  Injectable 1 milliGRAM(s) IntraMuscular once PRN Glucose LESS THAN 70 milligrams/deciliter      REVIEW OF SYSTEMS:    CONSTITUTIONAL: No fever, chills,   has weight loss, or fatigue  HEENT: No sore throat, runny nose, ear ache  RESPIRATORY: No cough, wheezing, No shortness of breath  CARDIOVASCULAR: No chest pain, palpitations, dizziness  GASTROINTESTINAL: No abdominal pain. No nausea, vomiting, diarrhea  GENITOURINARY: No dysuria, increase frequency, hematuria, or incontinence  NEUROLOGICAL: No headaches, memory loss, loss of strength, numbness, or tremors, no weakness  EXTREMITY: No pedal edema BLE  SKIN: No itching, burning, rashes, or lesions     VITAL SIGNS:  T(C): 37.2 (07-27-20 @ 11:02), Max: 37.2 (07-27-20 @ 11:02)  T(F): 98.9 (07-27-20 @ 11:02), Max: 98.9 (07-27-20 @ 11:02)  HR: 86 (07-27-20 @ 11:02) (71 - 86)  BP: 122/62 (07-27-20 @ 11:02) (104/53 - 122/62)  RR: 18 (07-27-20 @ 11:02) (17 - 18)  SpO2: 98% (07-27-20 @ 11:02) (98% - 99%)  Wt(kg): --    PHYSICAL EXAM:    GENERAL: not in any distress  HEENT: Neck is supple, normocephalic, atraumatic   CHEST/LUNG: Clear to auscultation bilaterally; No rales, rhonchi, wheezing  HEART: Regular rate and rhythm; No murmurs, rubs, or gallops  ABDOMEN: Soft, Nontender, Nondistended; Bowel sounds present, no rebound   EXTREMITIES:  2+ Peripheral Pulses, No clubbing, cyanosis, or edema  SKIN: No rashes or lesions  BACK: no pressor sore   NERVOUS SYSTEM:  Alert & Oriented X3, Good concentration  PSYCH: normal affect     LABS:                         11.9   8.68  )-----------( 315      ( 26 Jul 2020 06:47 )             34.6     07-26    137  |  102  |  13  ----------------------------<  164<H>  3.9   |  27  |  0.79    Ca    8.6      26 Jul 2020 06:47  Phos  3.0     07-26  Mg     2.3     07-26    TPro  7.5  /  Alb  3.0<L>  /  TBili  0.8  /  DBili  x   /  AST  44<H>  /  ALT  39  /  AlkPhos  83  07-26    LIVER FUNCTIONS - ( 26 Jul 2020 06:47 )  Alb: 3.0 g/dL / Pro: 7.5 gm/dL / ALK PHOS: 83 U/L / ALT: 39 U/L / AST: 44 U/L / GGT: x                                   Culture Results:   No growth (07-25 @ 00:55)                Radiology:      < from: CT Abdomen and Pelvis w/ IV Cont (07.06.20 @ 14:41) >  IMPRESSION:   Right upper lobe opacities, possibly infectious. Recommend continued follow-up to resolution.  Negative for pulmonary embolism.  Bilateral nonobstructive nephrolithiasis. No hydronephrosis.                GRACE SALVADOR M.D., ATTENDING RADIOLOGIST  This document has been electronically signed. Jul 6 2020  3:08PM              < end of copied text >

## 2020-07-27 NOTE — PROGRESS NOTE ADULT - ASSESSMENT
weight loss and cough with + QF gold but esr normal???  hiv test neg   neg  millie, anca   sp  bronchoscopy ;  not toxic appearing  will be discussed with JOHANNE re discharge plan   afb is not Mtb and not kirsten per micro  mantain isolation   discussed with infection control today   discussed with path today   they have the specimen not processed yet   await path   discharge plan without TB drugs   outpt follow up to decide if treatment needed for non tuberculous mycobacteria once we know what it is

## 2020-07-27 NOTE — PROGRESS NOTE ADULT - SUBJECTIVE AND OBJECTIVE BOX
INTERVAL HPI:   60 yr male, reports being  diagnosed of latent TB(not clear how) and started on Rifampin and Isoniazid 3 months ago while in [facility]. Reports had cough and fever in April.  [place], went to [facility] in January but could not return due to COVID.  Came to [place] 2 weeks ago,  daughter noticed 20 pound  wt loss, weakness, poor appetite, constantly thirsty, increased urination. They attributed it to the TB medications Took  him to  PMD, who recommended that he go to ED, passed out-( slumped over while sitting in a chair ) and did not respond for couple of minutes.  In ED noted severe hyperglycemia(new diagnosis of DM) but not in DKA. IVF and Insulin given. (08 Jul 2020 16:55).  AS above had cough and fever in April which improved after getting started on INH+ Rifampin.   Denied fever, chills,  cough , sputum production or night sweats.  07/24/20: Bronchoscopy with BAL and biopsy.    OVERNIGHT EVENTS:  No new complains.    Vital Signs Last 24 Hrs  T(C): 37.2 (27 Jul 2020 11:02), Max: 37.2 (27 Jul 2020 11:02)  T(F): 98.9 (27 Jul 2020 11:02), Max: 98.9 (27 Jul 2020 11:02)  HR: 86 (27 Jul 2020 11:02) (71 - 86)  BP: 122/62 (27 Jul 2020 11:02) (104/53 - 122/62)  BP(mean): --  RR: 18 (27 Jul 2020 11:02) (17 - 18)  SpO2: 98% (27 Jul 2020 11:02) (98% - 98%)    PHYSICAL EXAM:  GEN:         Awake, responsive and comfortable.  HEENT:    Normal.    RESP:        no distress  CVS:             Regular rate and rhythm.   ABD:         Soft, non-tender, non-distended;     MEDICATIONS  (STANDING):  benzocaine 15 mG/menthol 3.6 mG (Sugar-Free) Lozenge 1 Lozenge Oral four times a day  dextrose 5%. 1000 milliLiter(s) (50 mL/Hr) IV Continuous <Continuous>  dextrose 50% Injectable 12.5 Gram(s) IV Push once  dextrose 50% Injectable 25 Gram(s) IV Push once  dextrose 50% Injectable 25 Gram(s) IV Push once  enoxaparin Injectable 40 milliGRAM(s) SubCutaneous daily  insulin glargine Injectable (LANTUS) 6 Unit(s) SubCutaneous at bedtime  insulin lispro (HumaLOG) corrective regimen sliding scale   SubCutaneous three times a day before meals  insulin lispro Injectable (HumaLOG) 2 Unit(s) SubCutaneous three times a day before meals  metFORMIN 500 milliGRAM(s) Oral two times a day with meals  midodrine 5 milliGRAM(s) Oral every 8 hours    MEDICATIONS  (PRN):  acetaminophen   Tablet .. 650 milliGRAM(s) Oral every 6 hours PRN Moderate Pain (4 - 6)  dextrose 40% Gel 15 Gram(s) Oral once PRN Blood Glucose LESS THAN 70 milliGRAM(s)/deciliter  glucagon  Injectable 1 milliGRAM(s) IntraMuscular once PRN Glucose LESS THAN 70 milligrams/deciliter    LABS:                        11.9   8.68  )-----------( 315      ( 26 Jul 2020 06:47 )             34.6     07-26    137  |  102  |  13  ----------------------------<  164<H>  3.9   |  27  |  0.79    Ca    8.6      26 Jul 2020 06:47  Phos  3.0     07-26  Mg     2.3     07-26    TPro  7.5  /  Alb  3.0<L>  /  TBili  0.8  /  DBili  x   /  AST  44<H>  /  ALT  39  /  AlkPhos  83  07-26    07-25 @ 01:10  pH: 7.39  pCO2: 42  pO2: 198  SaO2: 100  ASSESSMENT AND PLAN:  ·	RUL opacities .  ·	AFB growing in one of  broth culture( not MTB or ARELY).  ·	Positive QuantiFeron Gold and weight loss.  ·	Newly diagnosed DM  ·	Hyperglycemia.    Was on INH+ Rifampin for about 4 months which were started in [facility] for presumably latent TB( had cough and fever at that time).  Admitted this time with new onset DM and weight loss. Chest CT with RUL opacities. One of the induced sputum grew AFB from broth culture( not PTB or ARELY) per microbiology. INH+ Rifampin stopped by ID to increase yield.  Sedrate less 5 x 2.    HIV negative.  ACE level normal.  DANIELLE, C-ANCA, P- ANCA  negative.  Hepatitis negative.  06/24/20: BAL smear negative for AFB.    No identification on AFB reported earlier induced sputum from broth media.

## 2020-07-27 NOTE — PROGRESS NOTE ADULT - SUBJECTIVE AND OBJECTIVE BOX
Patient is a 60y old  Male who presents with a chief complaint of syncope (26 Jul 2020 11:29)      INTERVAL HPI/OVERNIGHT EVENTS:  pt with no complaints  eating well, good appetite    MEDICATIONS  (STANDING):  benzocaine 15 mG/menthol 3.6 mG (Sugar-Free) Lozenge 1 Lozenge Oral four times a day  dextrose 5%. 1000 milliLiter(s) (50 mL/Hr) IV Continuous <Continuous>  dextrose 50% Injectable 12.5 Gram(s) IV Push once  dextrose 50% Injectable 25 Gram(s) IV Push once  dextrose 50% Injectable 25 Gram(s) IV Push once  enoxaparin Injectable 40 milliGRAM(s) SubCutaneous daily  insulin glargine Injectable (LANTUS) 6 Unit(s) SubCutaneous at bedtime  insulin lispro (HumaLOG) corrective regimen sliding scale   SubCutaneous three times a day before meals  insulin lispro Injectable (HumaLOG) 2 Unit(s) SubCutaneous three times a day before meals  midodrine 5 milliGRAM(s) Oral every 8 hours    MEDICATIONS  (PRN):  acetaminophen   Tablet .. 650 milliGRAM(s) Oral every 6 hours PRN Moderate Pain (4 - 6)  dextrose 40% Gel 15 Gram(s) Oral once PRN Blood Glucose LESS THAN 70 milliGRAM(s)/deciliter  glucagon  Injectable 1 milliGRAM(s) IntraMuscular once PRN Glucose LESS THAN 70 milligrams/deciliter      REVIEW OF SYSTEMS:  CONSTITUTIONAL: No fever, weight loss, or fatigue  RESPIRATORY: No cough, wheezing, chills or hemoptysis; No shortness of breath  CARDIOVASCULAR: No chest pain, palpitations, dizziness, or leg swelling  GASTROINTESTINAL: No abdominal or epigastric pain. No nausea, vomiting, or hematemesis; No diarrhea or constipation. No melena or hematochezia.  ENDOCRINE: No heat or cold intolerance; No hair loss      Vital Signs Last 24 Hrs  T(C): 36.6 (27 Jul 2020 06:19), Max: 36.9 (26 Jul 2020 11:53)  T(F): 97.8 (27 Jul 2020 06:19), Max: 98.4 (26 Jul 2020 11:53)  HR: 74 (27 Jul 2020 06:19) (71 - 84)  BP: 104/53 (27 Jul 2020 06:19) (104/53 - 116/56)  BP(mean): --  RR: 18 (27 Jul 2020 06:19) (17 - 18)  SpO2: 98% (27 Jul 2020 06:19) (98% - 99%)    PHYSICAL EXAM:  GENERAL: NAD      LABS:                        11.9   8.68  )-----------( 315      ( 26 Jul 2020 06:47 )             34.6     07-26    137  |  102  |  13  ----------------------------<  164<H>  3.9   |  27  |  0.79    Ca    8.6      26 Jul 2020 06:47  Phos  3.0     07-26  Mg     2.3     07-26    TPro  7.5  /  Alb  3.0<L>  /  TBili  0.8  /  DBili  x   /  AST  44<H>  /  ALT  39  /  AlkPhos  83  07-26        CAPILLARY BLOOD GLUCOSE      POCT Blood Glucose.: 145 mg/dL (27 Jul 2020 07:45)  POCT Blood Glucose.: 212 mg/dL (26 Jul 2020 22:41)  POCT Blood Glucose.: 175 mg/dL (26 Jul 2020 16:51)  POCT Blood Glucose.: 292 mg/dL (26 Jul 2020 11:45)    Lipid panel:               RADIOLOGY & ADDITIONAL TESTS:

## 2020-07-27 NOTE — PROGRESS NOTE ADULT - SUBJECTIVE AND OBJECTIVE BOX
I inherited patient fro first time on today 7/26/2020    No overnight events.   no fever  no diarrhea  no chest pain  no shortness of breath at rest.       MEDICATIONS  (STANDING):  benzocaine 15 mG/menthol 3.6 mG (Sugar-Free) Lozenge 1 Lozenge Oral four times a day  dextrose 5%. 1000 milliLiter(s) (50 mL/Hr) IV Continuous <Continuous>  dextrose 50% Injectable 12.5 Gram(s) IV Push once  dextrose 50% Injectable 25 Gram(s) IV Push once  dextrose 50% Injectable 25 Gram(s) IV Push once  enoxaparin Injectable 40 milliGRAM(s) SubCutaneous daily  insulin glargine Injectable (LANTUS) 6 Unit(s) SubCutaneous at bedtime  insulin lispro (HumaLOG) corrective regimen sliding scale   SubCutaneous three times a day before meals  insulin lispro Injectable (HumaLOG) 2 Unit(s) SubCutaneous three times a day before meals  metFORMIN 500 milliGRAM(s) Oral two times a day with meals  midodrine 5 milliGRAM(s) Oral every 8 hours    MEDICATIONS  (PRN):  acetaminophen   Tablet .. 650 milliGRAM(s) Oral every 6 hours PRN Moderate Pain (4 - 6)  dextrose 40% Gel 15 Gram(s) Oral once PRN Blood Glucose LESS THAN 70 milliGRAM(s)/deciliter  glucagon  Injectable 1 milliGRAM(s) IntraMuscular once PRN Glucose LESS THAN 70 milligrams/deciliter    Vital Signs Last 24 Hrs  T(C): 36.6 (27 Jul 2020 06:19), Max: 36.9 (26 Jul 2020 11:53)  T(F): 97.8 (27 Jul 2020 06:19), Max: 98.4 (26 Jul 2020 11:53)  HR: 74 (27 Jul 2020 06:19) (71 - 84)  BP: 104/53 (27 Jul 2020 06:19) (104/53 - 116/56)  BP(mean): --  RR: 18 (27 Jul 2020 06:19) (17 - 18)  SpO2: 98% (27 Jul 2020 06:19) (98% - 99%)      PHYSICAL EXAM:    GENERAL: Thinly built. no Acute distress  CHEST/LUNG: Clear to ausculation bilaterally, no wheezing, no crackles   HEART: Regular rate and rhythm; No murmurs, rubs  ABDOMEN: Soft, Nontender, Nondistended; Bowel sounds present  EXTREMITIES:  Moving all four extremities spontaneously, No clubbing, cyanosis, or edema  NERVOUS SYSTEM:  Grossly non focal.  Psychiatry: AAO x 3, mood is appropriate       LABS:                                           11.9   8.68  )-----------( 315      ( 26 Jul 2020 06:47 )             34.6   07-26    137  |  102  |  13  ----------------------------<  164<H>  3.9   |  27  |  0.79    Ca    8.6      26 Jul 2020 06:47  Phos  3.0     07-26  Mg     2.3     07-26    TPro  7.5  /  Alb  3.0<L>  /  TBili  0.8  /  DBili  x   /  AST  44<H>  /  ALT  39  /  AlkPhos  83  07-26        CAPILLARY BLOOD GLUCOSE    CAPILLARY BLOOD GLUCOSE      POCT Blood Glucose.: 222 mg/dL (27 Jul 2020 10:41)  POCT Blood Glucose.: 145 mg/dL (27 Jul 2020 07:45)  POCT Blood Glucose.: 212 mg/dL (26 Jul 2020 22:41)  POCT Blood Glucose.: 175 mg/dL (26 Jul 2020 16:51)  POCT Blood Glucose.: 292 mg/dL (26 Jul 2020 11:45)

## 2020-07-28 LAB
GLUCOSE BLDC GLUCOMTR-MCNC: 139 MG/DL — HIGH (ref 70–99)
GLUCOSE BLDC GLUCOMTR-MCNC: 140 MG/DL — HIGH (ref 70–99)
GLUCOSE BLDC GLUCOMTR-MCNC: 155 MG/DL — HIGH (ref 70–99)
GLUCOSE BLDC GLUCOMTR-MCNC: 165 MG/DL — HIGH (ref 70–99)

## 2020-07-28 PROCEDURE — 99232 SBSQ HOSP IP/OBS MODERATE 35: CPT

## 2020-07-28 PROCEDURE — 70551 MRI BRAIN STEM W/O DYE: CPT | Mod: 26

## 2020-07-28 RX ADMIN — Medication 2 UNIT(S): at 11:52

## 2020-07-28 RX ADMIN — METFORMIN HYDROCHLORIDE 500 MILLIGRAM(S): 850 TABLET ORAL at 08:31

## 2020-07-28 RX ADMIN — ENOXAPARIN SODIUM 40 MILLIGRAM(S): 100 INJECTION SUBCUTANEOUS at 13:24

## 2020-07-28 RX ADMIN — MIDODRINE HYDROCHLORIDE 5 MILLIGRAM(S): 2.5 TABLET ORAL at 06:26

## 2020-07-28 RX ADMIN — BENZOCAINE AND MENTHOL 1 LOZENGE: 5; 1 LIQUID ORAL at 17:04

## 2020-07-28 RX ADMIN — BENZOCAINE AND MENTHOL 1 LOZENGE: 5; 1 LIQUID ORAL at 13:24

## 2020-07-28 RX ADMIN — INSULIN GLARGINE 6 UNIT(S): 100 INJECTION, SOLUTION SUBCUTANEOUS at 21:15

## 2020-07-28 RX ADMIN — Medication 2 UNIT(S): at 08:26

## 2020-07-28 RX ADMIN — BENZOCAINE AND MENTHOL 1 LOZENGE: 5; 1 LIQUID ORAL at 23:43

## 2020-07-28 RX ADMIN — BENZOCAINE AND MENTHOL 1 LOZENGE: 5; 1 LIQUID ORAL at 06:26

## 2020-07-28 RX ADMIN — MIDODRINE HYDROCHLORIDE 5 MILLIGRAM(S): 2.5 TABLET ORAL at 21:11

## 2020-07-28 RX ADMIN — Medication 1: at 17:04

## 2020-07-28 RX ADMIN — Medication 2 UNIT(S): at 17:04

## 2020-07-28 RX ADMIN — Medication 1: at 08:25

## 2020-07-28 RX ADMIN — METFORMIN HYDROCHLORIDE 500 MILLIGRAM(S): 850 TABLET ORAL at 17:04

## 2020-07-28 RX ADMIN — MIDODRINE HYDROCHLORIDE 5 MILLIGRAM(S): 2.5 TABLET ORAL at 13:24

## 2020-07-28 NOTE — PROGRESS NOTE ADULT - SUBJECTIVE AND OBJECTIVE BOX
INTERVAL HPI:  60 yr male, reports being  diagnosed of latent TB(not clear how) and started on Rifampin and Isoniazid 3 months ago while in [facility]. Reports had cough and fever in April.  [place]ives in [place] 5 years, went to [facility] in January but could not return due to COVID.  Came to [place] 2 weeks ago,  daughter noticed 20 pound  wt loss, weakness, poor appetite, constantly thirsty, increased urination. They attributed it to the TB medications Took  him to  PMD, who recommended that he go to ED, passed out-( slumped over while sitting in a chair ) and did not respond for couple of minutes.  In ED noted severe hyperglycemia(new diagnosis of DM) but not in DKA. IVF and Insulin given. (08 Jul 2020 16:55).  AS above had cough and fever in April which improved after getting started on INH+ Rifampin.   Denied fever, chills,  cough , sputum production or night sweats.  07/24/20: Bronchoscopy with BAL and biopsy.    OVERNIGHT EVENTS:  Comfortable in chair.    Vital Signs Last 24 Hrs  T(C): 36.1 (28 Jul 2020 16:56), Max: 37.1 (28 Jul 2020 12:43)  T(F): 97 (28 Jul 2020 16:56), Max: 98.7 (28 Jul 2020 12:43)  HR: 80 (28 Jul 2020 16:56) (75 - 87)  BP: 106/63 (28 Jul 2020 16:56) (105/55 - 132/75)  BP(mean): --  RR: 16 (28 Jul 2020 16:56) (16 - 17)  SpO2: 97% (28 Jul 2020 16:56) (96% - 98%)    PHYSICAL EXAM:  GEN:         Awake, responsive and comfortable.  HEENT:    Normal.    RESP:       no wheezing.  CVS:             Regular rate and rhythm.   ABD:         Soft, non-tender, non-distended;     MEDICATIONS  (STANDING):  benzocaine 15 mG/menthol 3.6 mG (Sugar-Free) Lozenge 1 Lozenge Oral four times a day  dextrose 5%. 1000 milliLiter(s) (50 mL/Hr) IV Continuous <Continuous>  dextrose 50% Injectable 12.5 Gram(s) IV Push once  dextrose 50% Injectable 25 Gram(s) IV Push once  dextrose 50% Injectable 25 Gram(s) IV Push once  enoxaparin Injectable 40 milliGRAM(s) SubCutaneous daily  insulin glargine Injectable (LANTUS) 6 Unit(s) SubCutaneous at bedtime  insulin lispro (HumaLOG) corrective regimen sliding scale   SubCutaneous three times a day before meals  insulin lispro Injectable (HumaLOG) 2 Unit(s) SubCutaneous three times a day before meals  metFORMIN 500 milliGRAM(s) Oral two times a day with meals  midodrine 5 milliGRAM(s) Oral every 8 hours    MEDICATIONS  (PRN):  acetaminophen   Tablet .. 650 milliGRAM(s) Oral every 6 hours PRN Moderate Pain (4 - 6)  dextrose 40% Gel 15 Gram(s) Oral once PRN Blood Glucose LESS THAN 70 milliGRAM(s)/deciliter  glucagon  Injectable 1 milliGRAM(s) IntraMuscular once PRN Glucose LESS THAN 70 milligrams/deciliter    07-25 @ 01:10  pH: 7.39  pCO2: 42  pO2: 198  SaO2: 100  ASSESSMENT AND PLAN:  ·	RUL opacities .  ·	AFB growing in one of  broth culture( not MTB or ARELY).  ·	Positive QuantiFeron Gold and weight loss.  ·	Newly diagnosed DM  ·	Hyperglycemia.    Was on INH+ Rifampin for about 4 months which were started in [facility] for presumably latent TB( had cough and fever at that time).  Admitted this time with new onset DM and weight loss. Chest CT with RUL opacities. One of the induced sputum grew AFB from broth culture( not PTB or ARELY) per microbiology. INH+ Rifampin stopped by ID to increase yield.  Sedrate less 5 x 2.    HIV negative.  ACE level normal.  DANIELLE, C-ANCA, P- ANCA  negative.  Hepatitis negative.  06/24/20: BAL smear negative for AFB.    Pulmonary stable and can be discharged if safe from ID point of view.

## 2020-07-28 NOTE — PROGRESS NOTE ADULT - SUBJECTIVE AND OBJECTIVE BOX
Patient is a 60y old  Male who presents with a chief complaint of tb (27 Jul 2020 11:27)      INTERVAL HPI/OVERNIGHT EVENTS:    MEDICATIONS  (STANDING):  benzocaine 15 mG/menthol 3.6 mG (Sugar-Free) Lozenge 1 Lozenge Oral four times a day  dextrose 5%. 1000 milliLiter(s) (50 mL/Hr) IV Continuous <Continuous>  dextrose 50% Injectable 12.5 Gram(s) IV Push once  dextrose 50% Injectable 25 Gram(s) IV Push once  dextrose 50% Injectable 25 Gram(s) IV Push once  enoxaparin Injectable 40 milliGRAM(s) SubCutaneous daily  insulin glargine Injectable (LANTUS) 6 Unit(s) SubCutaneous at bedtime  insulin lispro (HumaLOG) corrective regimen sliding scale   SubCutaneous three times a day before meals  insulin lispro Injectable (HumaLOG) 2 Unit(s) SubCutaneous three times a day before meals  metFORMIN 500 milliGRAM(s) Oral two times a day with meals  midodrine 5 milliGRAM(s) Oral every 8 hours    MEDICATIONS  (PRN):  acetaminophen   Tablet .. 650 milliGRAM(s) Oral every 6 hours PRN Moderate Pain (4 - 6)  dextrose 40% Gel 15 Gram(s) Oral once PRN Blood Glucose LESS THAN 70 milliGRAM(s)/deciliter  glucagon  Injectable 1 milliGRAM(s) IntraMuscular once PRN Glucose LESS THAN 70 milligrams/deciliter      REVIEW OF SYSTEMS:  CONSTITUTIONAL: No fever, weight loss, or fatigue  RESPIRATORY: No cough, wheezing, chills or hemoptysis; No shortness of breath  CARDIOVASCULAR: No chest pain, palpitations, dizziness, or leg swelling  GASTROINTESTINAL: No abdominal or epigastric pain. No nausea, vomiting, or hematemesis; No diarrhea or constipation. No melena or hematochezia.  ENDOCRINE: No heat or cold intolerance; No hair loss      Vital Signs Last 24 Hrs  T(C): 36.8 (28 Jul 2020 05:53), Max: 37.2 (27 Jul 2020 11:02)  T(F): 98.2 (28 Jul 2020 05:53), Max: 98.9 (27 Jul 2020 11:02)  HR: 78 (28 Jul 2020 05:53) (75 - 87)  BP: 105/55 (28 Jul 2020 05:53) (105/55 - 132/75)  BP(mean): --  RR: 16 (28 Jul 2020 05:53) (16 - 18)  SpO2: 97% (28 Jul 2020 05:53) (96% - 98%)    PHYSICAL EXAM:  GENERAL: NAD, well-groomed, well-developed        LABS:              CAPILLARY BLOOD GLUCOSE      POCT Blood Glucose.: 155 mg/dL (28 Jul 2020 08:07)  POCT Blood Glucose.: 189 mg/dL (27 Jul 2020 22:08)  POCT Blood Glucose.: 193 mg/dL (27 Jul 2020 16:38)  POCT Blood Glucose.: 222 mg/dL (27 Jul 2020 10:41)    Lipid panel:               RADIOLOGY & ADDITIONAL TESTS:

## 2020-07-28 NOTE — PROGRESS NOTE ADULT - SUBJECTIVE AND OBJECTIVE BOX
pt seen with weight loss hx qf gold +  afb neg for tb r/o non TB mycobacterial infection     Review of systems  denies fevers chills nausea vomiting or diarrhea     Physical exam    Gen: AAO x 3 No acute distress  Heent: PERRLA EOMI  Heart: RRR S1S2 no murmur  Lungs: Clear to auscultation  Abd: BS + soft and depressible non tender  Ext: No cyanosis or edema  Neuro: no deficit, neck supple  Skin: No rash   Back: No CVA tenderness     Laboratory test   laboratory test reviewed   wbc 8.6 hgb 11.9 hct 34 plt 315  na 137 k 3.9 cl 102 co2 27 bun 13 creat 0.7

## 2020-07-28 NOTE — PROGRESS NOTE ADULT - ASSESSMENT
r/o non TB mycobacterial infection     plan dc off antibiotics   needs f/u in our office for treatment once ID of afb obtained   discuss with pt family over the phone   all questions and concerns were addressed to their satisfaction

## 2020-07-28 NOTE — PROGRESS NOTE ADULT - ASSESSMENT
60 yr old with Latent TB from [facility] with wt loss, constant thirst, poor appetite, weakness and increased urination .    (I used creole speaking audio interpretor daily for my communication with the patient since 7/15/20)    R/O Pulm TB:  - H/O latent TB on meds since march  - As per daughter no H/o COVID-19, Neg Ab  - CT with rt upper lobe infiltrate  - Now on rifampin, and INH regime, held now per ID prior bronch  - Sputum cx (broth) showed AFB + bacilli 1 of 3  presumed not ARELY or MTb. per ID note on 7/25/2020 will need to f/u JOHANNE  - Positive QuantiFeron, Neg HIV  -s/p  Bronch on Friday 7/24/2020 xfer to ICU for monitoring  downgraded Sunday 7/26/2020 . BAL no AFB ?????   7/27/2020 called infection control at [facility] who instructed me to call Core lab held on at core lab for 30 min  to no avail, advised JEEVAN Ojeda to follow up when she is able   7/28/2020 bAL pathology still pending once ID clears pt then can go home       Syncope on admission  - Likely vasovagal   - In the setting of dehydration uncontrolled DM, viral illness  - PE ruled out  - CT head neg  - 2D echo with preserved EF, no acute path   intermittent headache ct head on admission noted , orthostatics performed on 7/27/2020  and wnl     DM type 2.  controlled blood sugars. cont current management.   - DKA ruled out  - Hb A1c 10.6  Endo on board.     Pseudohyponatremia: resolved       Severe protein-calorie malnutrition:  - Nutritional supplements.       MERCEDES:  - Resolved  - B/L non obstructing stone, outpt follow up     Headache. Cont prn tylenol.     Blurred vision. Needs outpatient Ophthalmologist follow up.    DVT ppx resume 60 yr old with Latent TB from [facility] with wt loss, constant thirst, poor appetite, weakness and increased urination .    (I used creole speaking audio interpretor daily for my communication with the patient since 7/15/20)    R/O Pulm TB:  - H/O latent TB on meds since march  - As per daughter no H/o COVID-19, Neg Ab  - CT with rt upper lobe infiltrate  - Now on rifampin, and INH regime, held now per ID prior bronch  - Sputum cx (broth) showed AFB + bacilli 1 of 3  presumed not ARELY or MTb. per ID note on 7/25/2020 will need to f/u JOHANNE  - Positive QuantiFeron, Neg HIV  -s/p  Bronch on Friday 7/24/2020 xfer to ICU for monitoring  downgraded Sunday 7/26/2020 . BAL no AFB ?????   7/27/2020 called infection control at [facility] who instructed me to call Core lab held on at core lab for 30 min  to no avail, advised JEEVAN Ojeda to follow up when she is able   7/28/2020 bAL pathology still pending once ID clears pt then can go home       Syncope on admission  - Likely vasovagal   - In the setting of dehydration uncontrolled DM, viral illness  - PE ruled out  - CT head neg  - 2D echo with preserved EF, no acute path   intermittent headache ct head on admission noted , orthostatics performed on 7/27/2020  and wnl , will getMRI brain    DM type 2.  controlled blood sugars. cont current management.   - DKA ruled out  - Hb A1c 10.6  Endo on board.     Pseudohyponatremia: resolved       Severe protein-calorie malnutrition:  - Nutritional supplements.       MERCEDES:  - Resolved  - B/L non obstructing stone, outpt follow up     Headache. Cont prn tylenol.     Blurred vision. Needs outpatient Ophthalmologist follow up.    DVT ppx resume       d/w niece via phone and acted as

## 2020-07-29 LAB
CHOLEST SERPL-MCNC: 178 MG/DL — SIGNIFICANT CHANGE UP (ref 10–199)
CULTURE RESULTS: SIGNIFICANT CHANGE UP
GLUCOSE BLDC GLUCOMTR-MCNC: 124 MG/DL — HIGH (ref 70–99)
GRAM STN FLD: SIGNIFICANT CHANGE UP
HDLC SERPL-MCNC: 65 MG/DL — SIGNIFICANT CHANGE UP
LIPID PNL WITH DIRECT LDL SERPL: 86 MG/DL — SIGNIFICANT CHANGE UP
SPECIMEN SOURCE: SIGNIFICANT CHANGE UP
TOTAL CHOLESTEROL/HDL RATIO MEASUREMENT: 2.8 RATIO — LOW (ref 3.4–9.6)
TRIGL SERPL-MCNC: 139 MG/DL — SIGNIFICANT CHANGE UP (ref 10–149)

## 2020-07-29 PROCEDURE — 99233 SBSQ HOSP IP/OBS HIGH 50: CPT

## 2020-07-29 RX ORDER — ASPIRIN/CALCIUM CARB/MAGNESIUM 324 MG
81 TABLET ORAL DAILY
Refills: 0 | Status: DISCONTINUED | OUTPATIENT
Start: 2020-07-29 | End: 2020-07-31

## 2020-07-29 RX ORDER — ATORVASTATIN CALCIUM 80 MG/1
40 TABLET, FILM COATED ORAL AT BEDTIME
Refills: 0 | Status: DISCONTINUED | OUTPATIENT
Start: 2020-07-29 | End: 2020-07-31

## 2020-07-29 RX ADMIN — BENZOCAINE AND MENTHOL 1 LOZENGE: 5; 1 LIQUID ORAL at 17:22

## 2020-07-29 RX ADMIN — Medication 2 UNIT(S): at 08:20

## 2020-07-29 RX ADMIN — INSULIN GLARGINE 6 UNIT(S): 100 INJECTION, SOLUTION SUBCUTANEOUS at 21:24

## 2020-07-29 RX ADMIN — BENZOCAINE AND MENTHOL 1 LOZENGE: 5; 1 LIQUID ORAL at 23:27

## 2020-07-29 RX ADMIN — ENOXAPARIN SODIUM 40 MILLIGRAM(S): 100 INJECTION SUBCUTANEOUS at 12:04

## 2020-07-29 RX ADMIN — ATORVASTATIN CALCIUM 40 MILLIGRAM(S): 80 TABLET, FILM COATED ORAL at 21:20

## 2020-07-29 RX ADMIN — METFORMIN HYDROCHLORIDE 500 MILLIGRAM(S): 850 TABLET ORAL at 17:22

## 2020-07-29 RX ADMIN — BENZOCAINE AND MENTHOL 1 LOZENGE: 5; 1 LIQUID ORAL at 12:04

## 2020-07-29 RX ADMIN — Medication 81 MILLIGRAM(S): at 12:04

## 2020-07-29 RX ADMIN — Medication 2 UNIT(S): at 17:22

## 2020-07-29 RX ADMIN — MIDODRINE HYDROCHLORIDE 5 MILLIGRAM(S): 2.5 TABLET ORAL at 21:20

## 2020-07-29 RX ADMIN — MIDODRINE HYDROCHLORIDE 5 MILLIGRAM(S): 2.5 TABLET ORAL at 13:52

## 2020-07-29 RX ADMIN — Medication 2 UNIT(S): at 12:03

## 2020-07-29 RX ADMIN — METFORMIN HYDROCHLORIDE 500 MILLIGRAM(S): 850 TABLET ORAL at 08:20

## 2020-07-29 RX ADMIN — MIDODRINE HYDROCHLORIDE 5 MILLIGRAM(S): 2.5 TABLET ORAL at 05:57

## 2020-07-29 RX ADMIN — BENZOCAINE AND MENTHOL 1 LOZENGE: 5; 1 LIQUID ORAL at 05:57

## 2020-07-29 NOTE — PROGRESS NOTE ADULT - ASSESSMENT
r/o non TB mycobacterial infection ( neg for ARELY and MTB )    plan dc off antibiotics   needs f/u in our office for treatment once ID of afb obtained   discuss with pt family over the phone   all questions and concerns were addressed to their satisfaction

## 2020-07-29 NOTE — CHART NOTE - NSCHARTNOTEFT_GEN_A_CORE
Pt admitted s/p syncope event; c latent TB, wt loss; found c newly dx DM; bronchoscopy (7/24) revealed negative for TB & no malignancy; ID r/o bacterial infection; pt continued to c/o headaches; found on MRI c acute frontal CVA. Pt was educated re DM diet recommendations via video  on 7/22; educational material was sent home to pt daughter so she can use as reference & review c pt. Pt to be d/c on Metformin & Januvia per endo note.     Factors impacting intake: [ X] none [ ] nausea  [ ] vomiting [ ] diarrhea [ ] constipation  [ ]chewing problems [ ] swallowing issues  [ ] other:     Diet Prescription: Diet, Consistent Carbohydrate w/Evening Snack:   Supplement Feeding Modality:  Oral  Glucerna Shake Cans or Servings Per Day:  1       Frequency:  Three Times a day (07-09-20 @ 11:35)    Intake: pt continues c good appetite; 100% most meals; drinking supplement    Current Weight: 47.5 kg (7/29); admission wt 51.8 kg (7/9)  % Weight Change: 8.3% wt loss c 20 days    Pertinent Medications: MEDICATIONS  (STANDING):  aspirin enteric coated 81 milliGRAM(s) Oral daily  atorvastatin 40 milliGRAM(s) Oral at bedtime  benzocaine 15 mG/menthol 3.6 mG (Sugar-Free) Lozenge 1 Lozenge Oral four times a day  dextrose 5%. 1000 milliLiter(s) (50 mL/Hr) IV Continuous <Continuous>  dextrose 50% Injectable 12.5 Gram(s) IV Push once  dextrose 50% Injectable 25 Gram(s) IV Push once  dextrose 50% Injectable 25 Gram(s) IV Push once  enoxaparin Injectable 40 milliGRAM(s) SubCutaneous daily  insulin glargine Injectable (LANTUS) 6 Unit(s) SubCutaneous at bedtime  insulin lispro (HumaLOG) corrective regimen sliding scale   SubCutaneous three times a day before meals  insulin lispro Injectable (HumaLOG) 2 Unit(s) SubCutaneous three times a day before meals  metFORMIN 500 milliGRAM(s) Oral two times a day with meals  midodrine 5 milliGRAM(s) Oral every 8 hours    MEDICATIONS  (PRN):  acetaminophen   Tablet .. 650 milliGRAM(s) Oral every 6 hours PRN Moderate Pain (4 - 6)  dextrose 40% Gel 15 Gram(s) Oral once PRN Blood Glucose LESS THAN 70 milliGRAM(s)/deciliter  glucagon  Injectable 1 milliGRAM(s) IntraMuscular once PRN Glucose LESS THAN 70 milligrams/deciliter    Pertinent Labs:  07-26 Phos 3.0 mg/dL 07-26 Alb 3.0 g/dL<L>      Skin:     Estimated Needs:   [ ] no change since previous assessment  [ ] recalculated:     Previous Nutrition Diagnosis:   [ ] Inadequate Energy Intake [ ]Inadequate Oral Intake [ ] Excessive Energy Intake   [ ] Underweight [ ] Increased Nutrient Needs [ ] Overweight/Obesity   [ ] Altered GI Function [ ] Unintended Weight Loss [ ] Food & Nutrition Related Knowledge Deficit [ ] Malnutrition     Nutrition Diagnosis is [ ] ongoing  [ ] resolved [ ] not applicable     New Nutrition Diagnosis: [ ] not applicable      Interventions:   Recommend  [ ] Change Diet To:  [ ] Nutrition Supplement  [ ] Nutrition Support  [ ] Other:     Monitoring and Evaluation:   [ ] PO intake [ x ] Tolerance to diet prescription [ x ] weights [ x ] labs[ x ] follow up per protocol  [ ] other: Pt admitted s/p syncope event; c latent TB, wt loss; found c newly dx DM; bronchoscopy (7/24) revealed negative for TB & no malignancy; ID r/o bacterial infection; pt continued to c/o headaches; found on MRI c acute frontal CVA. Pt was educated re DM diet recommendations via video  on 7/22; educational material was sent home to pt daughter so she can use as reference & review c pt. Pt to be d/c on Metformin & Januvia per endo note.     Factors impacting intake: [ X] none [ ] nausea  [ ] vomiting [ ] diarrhea [ ] constipation  [ ]chewing problems [ ] swallowing issues  [ ] other:     Diet Prescription: Diet, Consistent Carbohydrate w/Evening Snack:   Supplement Feeding Modality:  Oral  Glucerna Shake Cans or Servings Per Day:  1       Frequency:  Three Times a day (07-09-20 @ 11:35)    Intake: pt continues c good appetite; 100% most meals; drinking supplement    Current Weight: 47.5 kg (7/29); admission wt 51.8 kg (7/9)  % Weight Change: 8.3% wt loss c 20 days    Nutrition focused physical exam conducted:    Subcutaneous fat loss: [WDL ] Orbital fat pads region, [WDL ]Buccal fat region, [moderate ]Triceps region,  [severe ]Ribs region.    Muscle wasting: [severe ]Temples region, [severe ]Clavicle region, [severe ]Shoulder region, [unable ]Scapula region, [unable ]Interosseous region,  [moderate ]thigh region, [moderate ]Calf region    Physical Appearance:  no edema noted    Pertinent Medications: MEDICATIONS  (STANDING):  aspirin enteric coated 81 milliGRAM(s) Oral daily  atorvastatin 40 milliGRAM(s) Oral at bedtime  benzocaine 15 mG/menthol 3.6 mG (Sugar-Free) Lozenge 1 Lozenge Oral four times a day  dextrose 5%. 1000 milliLiter(s) (50 mL/Hr) IV Continuous <Continuous>  dextrose 50% Injectable 12.5 Gram(s) IV Push once  dextrose 50% Injectable 25 Gram(s) IV Push once  dextrose 50% Injectable 25 Gram(s) IV Push once  enoxaparin Injectable 40 milliGRAM(s) SubCutaneous daily  insulin glargine Injectable (LANTUS) 6 Unit(s) SubCutaneous at bedtime  insulin lispro (HumaLOG) corrective regimen sliding scale   SubCutaneous three times a day before meals  insulin lispro Injectable (HumaLOG) 2 Unit(s) SubCutaneous three times a day before meals  metFORMIN 500 milliGRAM(s) Oral two times a day with meals  midodrine 5 milliGRAM(s) Oral every 8 hours    MEDICATIONS  (PRN):  acetaminophen   Tablet .. 650 milliGRAM(s) Oral every 6 hours PRN Moderate Pain (4 - 6)  dextrose 40% Gel 15 Gram(s) Oral once PRN Blood Glucose LESS THAN 70 milliGRAM(s)/deciliter  glucagon  Injectable 1 milliGRAM(s) IntraMuscular once PRN Glucose LESS THAN 70 milligrams/deciliter    Pertinent Labs:  07-26 Phos 3.0 mg/dL 07-26 Alb 3.0 g/dL<L>; 07-07 A1c 10.6%; 07-28 POCT: 155, 139, 165, 140    Skin:  WDL    Estimated Needs:   [X ] no change since previous assessment  (7/9)  [ ] recalculated:     Previous Nutrition Diagnosis:   [X ] Severe malnutrition in context of acute illness  Etiology:  Inadequate energy/protein intake related to uncontrolled DM, TB  Signs & Symptoms:  13% wt loss x 3 months; moderate fat depletion & severe muscle wasting as noted    GOAL:  Pt to consume >75% meals/supplements - continues to be met; promote wt gain of 1#/week during hospitalization - not met    Nutrition Diagnosis is [X ] ongoing  [ ] resolved [ ] not applicable     New Nutrition Diagnosis: [x ] not applicable    Interventions:   continue current diet rx as noted  Recommend  [ ] Change Diet To:  [ ] Nutrition Supplement  [ ] Nutrition Support  [ ] Other:     Monitoring and Evaluation:   [X ] PO intake [ x ] Tolerance to diet prescription [ x ] weights [ x ] labs[ x ] follow up per protocol  [ ] other:

## 2020-07-29 NOTE — PROGRESS NOTE ADULT - SUBJECTIVE AND OBJECTIVE BOX
Patient is a 60y old  Male who presents with a chief complaint of syncope (28 Jul 2020 11:12)      Interval History: finger sticks are stable   on Lantus 6 units and prandial lispro 2 units and Metformin BID     MEDICATIONS  (STANDING):  aspirin enteric coated 81 milliGRAM(s) Oral daily  atorvastatin 40 milliGRAM(s) Oral at bedtime  benzocaine 15 mG/menthol 3.6 mG (Sugar-Free) Lozenge 1 Lozenge Oral four times a day  dextrose 5%. 1000 milliLiter(s) (50 mL/Hr) IV Continuous <Continuous>  dextrose 50% Injectable 12.5 Gram(s) IV Push once  dextrose 50% Injectable 25 Gram(s) IV Push once  dextrose 50% Injectable 25 Gram(s) IV Push once  enoxaparin Injectable 40 milliGRAM(s) SubCutaneous daily  insulin glargine Injectable (LANTUS) 6 Unit(s) SubCutaneous at bedtime  insulin lispro (HumaLOG) corrective regimen sliding scale   SubCutaneous three times a day before meals  insulin lispro Injectable (HumaLOG) 2 Unit(s) SubCutaneous three times a day before meals  metFORMIN 500 milliGRAM(s) Oral two times a day with meals  midodrine 5 milliGRAM(s) Oral every 8 hours    MEDICATIONS  (PRN):  acetaminophen   Tablet .. 650 milliGRAM(s) Oral every 6 hours PRN Moderate Pain (4 - 6)  dextrose 40% Gel 15 Gram(s) Oral once PRN Blood Glucose LESS THAN 70 milliGRAM(s)/deciliter  glucagon  Injectable 1 milliGRAM(s) IntraMuscular once PRN Glucose LESS THAN 70 milligrams/deciliter      Allergies    No Known Allergies    Intolerances        REVIEW OF SYSTEMS:  CONSTITUTIONAL: no changes      Vital Signs Last 24 Hrs  T(C): 36.7 (29 Jul 2020 17:12), Max: 37.1 (29 Jul 2020 12:00)  T(F): 98 (29 Jul 2020 17:12), Max: 98.8 (29 Jul 2020 12:00)  HR: 76 (29 Jul 2020 17:12) (76 - 91)  BP: 118/69 (29 Jul 2020 17:12) (104/64 - 118/69)  BP(mean): --  RR: 16 (29 Jul 2020 17:12) (16 - 16)  SpO2: 99% (29 Jul 2020 17:12) (98% - 99%)    PHYSICAL EXAM:  GENERAL: deferred     LABS:        CAPILLARY BLOOD GLUCOSE      POCT Blood Glucose.: 105 mg/dL (29 Jul 2020 16:30)  POCT Blood Glucose.: 117 mg/dL (29 Jul 2020 11:24)  POCT Blood Glucose.: 124 mg/dL (29 Jul 2020 07:52)  POCT Blood Glucose.: 140 mg/dL (28 Jul 2020 21:14)    Lipid panel:           Thyroid:  Diabetes Tests:  Parathyroid Panel:  Adrenals:  RADIOLOGY & ADDITIONAL TESTS:    Imaging Personally Reviewed:  [ ] YES  [ ] NO    Consultant(s) Notes Reviewed:  [ ] YES  [ ] NO    Care Discussed with Consultants/Other Providers [ ] YES  [ ] NO

## 2020-07-29 NOTE — PROGRESS NOTE ADULT - SUBJECTIVE AND OBJECTIVE BOX
Patient is a 60y old  Male who presents with a chief complaint of syncope (28 Jul 2020 11:12)      INTERVAL HPI/OVERNIGHT EVENTS:    MEDICATIONS  (STANDING):  aspirin enteric coated 81 milliGRAM(s) Oral daily  atorvastatin 40 milliGRAM(s) Oral at bedtime  benzocaine 15 mG/menthol 3.6 mG (Sugar-Free) Lozenge 1 Lozenge Oral four times a day  dextrose 5%. 1000 milliLiter(s) (50 mL/Hr) IV Continuous <Continuous>  dextrose 50% Injectable 12.5 Gram(s) IV Push once  dextrose 50% Injectable 25 Gram(s) IV Push once  dextrose 50% Injectable 25 Gram(s) IV Push once  enoxaparin Injectable 40 milliGRAM(s) SubCutaneous daily  insulin glargine Injectable (LANTUS) 6 Unit(s) SubCutaneous at bedtime  insulin lispro (HumaLOG) corrective regimen sliding scale   SubCutaneous three times a day before meals  insulin lispro Injectable (HumaLOG) 2 Unit(s) SubCutaneous three times a day before meals  metFORMIN 500 milliGRAM(s) Oral two times a day with meals  midodrine 5 milliGRAM(s) Oral every 8 hours    MEDICATIONS  (PRN):  acetaminophen   Tablet .. 650 milliGRAM(s) Oral every 6 hours PRN Moderate Pain (4 - 6)  dextrose 40% Gel 15 Gram(s) Oral once PRN Blood Glucose LESS THAN 70 milliGRAM(s)/deciliter  glucagon  Injectable 1 milliGRAM(s) IntraMuscular once PRN Glucose LESS THAN 70 milligrams/deciliter      Allergies    No Known Allergies    Intolerances        REVIEW OF SYSTEMS:  CONSTITUTIONAL: No fever, weight loss, or fatigue  EYES: No eye pain, visual disturbances, or discharge  ENMT:  No difficulty hearing, tinnitus, vertigo; No sinus or throat pain  NECK: No pain or stiffness  BREASTS: No pain, masses, or nipple discharge  RESPIRATORY: No cough, wheezing, chills or hemoptysis; No shortness of breath  CARDIOVASCULAR: No chest pain, palpitations, dizziness, or leg swelling  GASTROINTESTINAL: No abdominal or epigastric pain. No nausea, vomiting, or hematemesis; No diarrhea or constipation. No melena or hematochezia.  GENITOURINARY: No dysuria, frequency, hematuria, or incontinence  NEUROLOGICAL: No headaches, memory loss, loss of strength, numbness, or tremors  SKIN: No itching, burning, rashes, or lesions   LYMPH NODES: No enlarged glands  ENDOCRINE: No heat or cold intolerance; No hair loss  MUSCULOSKELETAL: No joint pain or swelling; No muscle, back, or extremity pain  PSYCHIATRIC: No depression, anxiety, mood swings, or difficulty sleeping  HEME/LYMPH: No easy bruising, or bleeding gums  ALLERGY AND IMMUNOLOGIC: No hives or eczema    Vital Signs Last 24 Hrs  T(C): 36.9 (29 Jul 2020 05:57), Max: 37.1 (28 Jul 2020 12:43)  T(F): 98.4 (29 Jul 2020 05:57), Max: 98.7 (28 Jul 2020 12:43)  HR: 79 (29 Jul 2020 05:57) (79 - 84)  BP: 113/66 (29 Jul 2020 05:57) (106/63 - 119/77)  BP(mean): --  RR: 16 (29 Jul 2020 05:57) (16 - 17)  SpO2: 98% (29 Jul 2020 05:57) (96% - 99%)    PHYSICAL EXAM:  GENERAL: NAD, well-groomed, well-developed  HEAD:  Atraumatic, Normocephalic  EYES: EOMI, PERRLA, conjunctiva and sclera clear  ENMT: No tonsillar erythema, exudates, or enlargement; Moist mucous membranes, Good dentition, No lesions  NECK: Supple, No JVD, Normal thyroid  NERVOUS SYSTEM:  Alert & Oriented X3, Good concentration; Motor Strength 5/5 B/L upper and lower extremities; DTRs 2+ intact and symmetric  CHEST/LUNG: Clear to percussion bilaterally; No rales, rhonchi, wheezing, or rubs  HEART: Regular rate and rhythm; No murmurs, rubs, or gallops  ABDOMEN: Soft, Nontender, Nondistended; Bowel sounds present  EXTREMITIES:  2+ Peripheral Pulses, No clubbing, cyanosis, or edema  LYMPH: No lymphadenopathy noted  SKIN: No rashes or lesions    LABS:              CAPILLARY BLOOD GLUCOSE      POCT Blood Glucose.: 117 mg/dL (29 Jul 2020 11:24)  POCT Blood Glucose.: 124 mg/dL (29 Jul 2020 07:52)  POCT Blood Glucose.: 140 mg/dL (28 Jul 2020 21:14)  POCT Blood Glucose.: 165 mg/dL (28 Jul 2020 16:54)      RADIOLOGY & ADDITIONAL TESTS:    Imaging Personally Reviewed:  [ X] YES  [ ] NO    Consultant(s) Notes Reviewed:  [ X] YES  [ ] NO    Care Discussed with Consultants/Other Providers [X ] YES  [ ] NO

## 2020-07-29 NOTE — PROGRESS NOTE ADULT - ASSESSMENT
60 yr old with Latent TB from [facility] with wt loss, constant thirst, poor appetite, weakness and increased urination .    (I used creole speaking audio interpretor daily for my communication with the patient since 7/15/20)    R/O Pulm TB:  - H/O latent TB on meds since march  - As per daughter no H/o COVID-19, Neg Ab  - CT with rt upper lobe infiltrate  - Now on rifampin, and INH regime, held now per ID prior bronch  - Sputum cx (broth) showed AFB + bacilli 1 of 3  presumed not ARELY or MTb. per ID note on 7/25/2020 will need to f/u JOHANNE  - Positive QuantiFeron, Neg HIV  -s/p  Bronch on Friday 7/24/2020 xfer to ICU for monitoring  downgraded Sunday 7/26/2020 . BAL no AFB ?????   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  controlled blood sugars. cont current management.   - DKA ruled out  - Hb A1c 10.6  Endo on board.     Pseudohyponatremia: resolved       Severe protein-calorie malnutrition:  - Nutritional supplements.       MERCEDES:  - Resolved  - B/L non obstructing stone, outpt follow up     Headache. Cont prn tylenol.     Blurred vision. Needs outpatient Ophthalmologist follow up.    DVT ppx resume       d/w niece via phone and acted as

## 2020-07-29 NOTE — PROGRESS NOTE ADULT - SUBJECTIVE AND OBJECTIVE BOX
pt seen with weight loss hx qf gold +  afb neg for tb r/o non TB mycobacterial infection     Review of systems  denies fevers chills nausea vomiting or diarrhea     vital signs reviewed   T 98.4 P 79 bp 113/66    Physical exam  Gen: AAO x 3 No acute distress  Heent: PERRLA EOMI  Heart: RRR S1S2 no murmur  Lungs: Clear to auscultation  Abd: BS + soft and depressible non tender  Ext: No cyanosis or edema  Neuro: no deficit, neck supple  Skin: No rash   Back: No CVA tenderness       Laboratory test   laboratory test reviewed   no recent labs

## 2020-07-30 PROCEDURE — 99233 SBSQ HOSP IP/OBS HIGH 50: CPT

## 2020-07-30 RX ORDER — METFORMIN HYDROCHLORIDE 850 MG/1
1000 TABLET ORAL
Refills: 0 | Status: DISCONTINUED | OUTPATIENT
Start: 2020-07-30 | End: 2020-07-31

## 2020-07-30 RX ADMIN — BENZOCAINE AND MENTHOL 1 LOZENGE: 5; 1 LIQUID ORAL at 05:23

## 2020-07-30 RX ADMIN — BENZOCAINE AND MENTHOL 1 LOZENGE: 5; 1 LIQUID ORAL at 11:58

## 2020-07-30 RX ADMIN — MIDODRINE HYDROCHLORIDE 5 MILLIGRAM(S): 2.5 TABLET ORAL at 05:23

## 2020-07-30 RX ADMIN — Medication 1: at 17:00

## 2020-07-30 RX ADMIN — Medication 81 MILLIGRAM(S): at 11:58

## 2020-07-30 RX ADMIN — MIDODRINE HYDROCHLORIDE 5 MILLIGRAM(S): 2.5 TABLET ORAL at 14:11

## 2020-07-30 RX ADMIN — ENOXAPARIN SODIUM 40 MILLIGRAM(S): 100 INJECTION SUBCUTANEOUS at 11:58

## 2020-07-30 RX ADMIN — METFORMIN HYDROCHLORIDE 1000 MILLIGRAM(S): 850 TABLET ORAL at 10:22

## 2020-07-30 RX ADMIN — ATORVASTATIN CALCIUM 40 MILLIGRAM(S): 80 TABLET, FILM COATED ORAL at 21:42

## 2020-07-30 RX ADMIN — Medication 1: at 12:17

## 2020-07-30 RX ADMIN — METFORMIN HYDROCHLORIDE 1000 MILLIGRAM(S): 850 TABLET ORAL at 17:06

## 2020-07-30 RX ADMIN — MIDODRINE HYDROCHLORIDE 5 MILLIGRAM(S): 2.5 TABLET ORAL at 21:42

## 2020-07-30 RX ADMIN — BENZOCAINE AND MENTHOL 1 LOZENGE: 5; 1 LIQUID ORAL at 17:07

## 2020-07-30 RX ADMIN — INSULIN GLARGINE 6 UNIT(S): 100 INJECTION, SOLUTION SUBCUTANEOUS at 21:46

## 2020-07-30 NOTE — PROGRESS NOTE ADULT - PROBLEM SELECTOR PROBLEM 6
Weight loss
MERCEDES (acute kidney injury)

## 2020-07-30 NOTE — PROGRESS NOTE ADULT - SUBJECTIVE AND OBJECTIVE BOX
HPI:  60 yr old diagnosed of latent TB and started on Rifampin and Isoniazid 3 months ago while in [facility]. He came to [place] 2 weeks ago and the dgtr has noticed 20 pds wt loss, weakness, poor appetite, constantly thirsty, increased urination. They attributed it to the TB medications SE. They did take him to their PMD, who recommended that he go to ED. However patient passed out- slumped over while sitting in a chair and did not respond for couple of minutes. Upon shaking after he came awake and said he was fine. Dgtr brought him to ED with concerns.     In ED noted severe hyperglycemia and not in DKA. IVF and Insulin given. (08 Jul 2020 16:55)      Allergies    No Known Allergies    Intolerances        MEDICATIONS  (STANDING):  aspirin enteric coated 81 milliGRAM(s) Oral daily  atorvastatin 40 milliGRAM(s) Oral at bedtime  benzocaine 15 mG/menthol 3.6 mG (Sugar-Free) Lozenge 1 Lozenge Oral four times a day  dextrose 5%. 1000 milliLiter(s) (50 mL/Hr) IV Continuous <Continuous>  dextrose 50% Injectable 12.5 Gram(s) IV Push once  dextrose 50% Injectable 25 Gram(s) IV Push once  dextrose 50% Injectable 25 Gram(s) IV Push once  enoxaparin Injectable 40 milliGRAM(s) SubCutaneous daily  insulin glargine Injectable (LANTUS) 6 Unit(s) SubCutaneous at bedtime  insulin lispro (HumaLOG) corrective regimen sliding scale   SubCutaneous three times a day before meals  metFORMIN 1000 milliGRAM(s) Oral two times a day with meals  midodrine 5 milliGRAM(s) Oral every 8 hours    MEDICATIONS  (PRN):  acetaminophen   Tablet .. 650 milliGRAM(s) Oral every 6 hours PRN Moderate Pain (4 - 6)  dextrose 40% Gel 15 Gram(s) Oral once PRN Blood Glucose LESS THAN 70 milliGRAM(s)/deciliter  glucagon  Injectable 1 milliGRAM(s) IntraMuscular once PRN Glucose LESS THAN 70 milligrams/deciliter      REVIEW OF SYSTEMS:    CONSTITUTIONAL: No fever, chills, weight loss, or fatigue  HEENT: No sore throat, runny nose, ear ache  RESPIRATORY: No cough, wheezing, No shortness of breath  CARDIOVASCULAR: No chest pain, palpitations, dizziness  GASTROINTESTINAL: No abdominal pain. No nausea, vomiting, diarrhea  GENITOURINARY: No dysuria, increase frequency, hematuria, or incontinence  NEUROLOGICAL: No headaches, memory loss, loss of strength, numbness, or tremors, no weakness  EXTREMITY: No pedal edema BLE  SKIN: No itching, burning, rashes, or lesions     VITAL SIGNS:  T(C): 36.7 (07-30-20 @ 17:20), Max: 36.9 (07-30-20 @ 04:52)  T(F): 98.1 (07-30-20 @ 17:20), Max: 98.5 (07-30-20 @ 04:52)  HR: 90 (07-30-20 @ 17:20) (74 - 90)  BP: 116/77 (07-30-20 @ 17:20) (107/64 - 116/77)  RR: 17 (07-30-20 @ 17:20) (16 - 18)  SpO2: 97% (07-30-20 @ 17:20) (96% - 99%)  Wt(kg): --    PHYSICAL EXAM:    GENERAL: not in any distress  HEENT: Neck is supple, normocephalic, atraumatic   CHEST/LUNG: Clear to auscultation bilaterally; No rales, rhonchi, wheezing  HEART: Regular rate and rhythm; No murmurs, rubs, or gallops  ABDOMEN: Soft, Nontender, Nondistended; Bowel sounds present, no rebound   EXTREMITIES:  2+ Peripheral Pulses, No clubbing, cyanosis, or edema  GENITOURINARY:   SKIN: No rashes or lesions  BACK: no pressor sore   NERVOUS SYSTEM:  Alert & Oriented X3, Good concentration  PSYCH: normal affect     LABS:                                     Culture Results:   No growth at 5 days (07-25 @ 00:55)  Culture Results:   Culture is being performed. (07-25 @ 00:54)                Radiology: HPI:  60 yr old diagnosed of latent TB and started on Rifampin and Isoniazid 3 months ago while in [place]. He came to [place] 2 weeks ago and the dgtr has noticed 20 pds wt loss, weakness, poor appetite, constantly thirsty, increased urination. They attributed it to the TB medications SE. They did take him to their PMD, who recommended that he go to ED. However patient passed out- slumped over while sitting in a chair and did not respond for couple of minutes. Upon shaking after he came awake and said he was fine. Dgtr brought him to ED with concerns.     In ED noted severe hyperglycemia and not in DKA. IVF and Insulin given. (08 Jul 2020 16:55)  patient had fevers in march ; april started tb meds ;; had seen a pulmonologist who saw his xray and gave him TB meds  no ct   no afbs sent   patient immigrated 2015;; was always NEGATIVE for Tb   if this is a new conversion already treated for latent tb with 3 months of inh and rifampin        Allergies    No Known Allergies    Intolerances        MEDICATIONS  (STANDING):  aspirin enteric coated 81 milliGRAM(s) Oral daily  atorvastatin 40 milliGRAM(s) Oral at bedtime  benzocaine 15 mG/menthol 3.6 mG (Sugar-Free) Lozenge 1 Lozenge Oral four times a day  dextrose 5%. 1000 milliLiter(s) (50 mL/Hr) IV Continuous <Continuous>  dextrose 50% Injectable 12.5 Gram(s) IV Push once  dextrose 50% Injectable 25 Gram(s) IV Push once  dextrose 50% Injectable 25 Gram(s) IV Push once  enoxaparin Injectable 40 milliGRAM(s) SubCutaneous daily  insulin glargine Injectable (LANTUS) 6 Unit(s) SubCutaneous at bedtime  insulin lispro (HumaLOG) corrective regimen sliding scale   SubCutaneous three times a day before meals  metFORMIN 1000 milliGRAM(s) Oral two times a day with meals  midodrine 5 milliGRAM(s) Oral every 8 hours    MEDICATIONS  (PRN):  acetaminophen   Tablet .. 650 milliGRAM(s) Oral every 6 hours PRN Moderate Pain (4 - 6)  dextrose 40% Gel 15 Gram(s) Oral once PRN Blood Glucose LESS THAN 70 milliGRAM(s)/deciliter  glucagon  Injectable 1 milliGRAM(s) IntraMuscular once PRN Glucose LESS THAN 70 milligrams/deciliter      REVIEW OF SYSTEMS:    CONSTITUTIONAL: No fever, chills,   had weight loss,   no  fatigue  HEENT: No sore throat, runny nose, ear ache  RESPIRATORY: No cough, wheezing, No shortness of breath  CARDIOVASCULAR: No chest pain, palpitations, dizziness  GASTROINTESTINAL: No abdominal pain. No nausea, vomiting, diarrhea  GENITOURINARY: No dysuria, increase frequency, hematuria, or incontinence  NEUROLOGICAL: No headaches, memory loss, loss of strength, numbness, or tremors, no weakness  EXTREMITY: No pedal edema BLE  SKIN: No itching, burning, rashes, or lesions     VITAL SIGNS:  T(C): 36.7 (07-30-20 @ 17:20), Max: 36.9 (07-30-20 @ 04:52)  T(F): 98.1 (07-30-20 @ 17:20), Max: 98.5 (07-30-20 @ 04:52)  HR: 90 (07-30-20 @ 17:20) (74 - 90)  BP: 116/77 (07-30-20 @ 17:20) (107/64 - 116/77)  RR: 17 (07-30-20 @ 17:20) (16 - 18)  SpO2: 97% (07-30-20 @ 17:20) (96% - 99%)  Wt(kg): --    PHYSICAL EXAM:    GENERAL: not in any distress  HEENT: Neck is supple, normocephalic, atraumatic   CHEST/LUNG: Clear to auscultation bilaterally; No rales, rhonchi, wheezing  HEART: Regular rate and rhythm; No murmurs, rubs, or gallops  ABDOMEN: Soft, Nontender, Nondistended; Bowel sounds present, no rebound   EXTREMITIES:  2+ Peripheral Pulses, No clubbing, cyanosis, or edema  SKIN: No rashes or lesions  BACK: no pressor sore   NERVOUS SYSTEM:  Alert & Oriented X3, Good concentration  PSYCH: normal affect     LABS:                                     Culture Results:   No growth at 5 days (07-25 @ 00:55)  Culture Results:   Culture is being performed. (07-25 @ 00:54)                Radiology:      < from: CT Abdomen and Pelvis w/ IV Cont (07.06.20 @ 14:41) >  IMPRESSION:   Right upper lobe opacities, possibly infectious. Recommend continued follow-up to resolution.  Negative for pulmonary embolism.  Bilateral nonobstructive nephrolithiasis. No hydronephrosis.                GARCE SALVADOR M.D., ATTENDING RADIOLOGIST  This document has been electronically signed. Jul 6 2020  3:08PM    < end of copied text >

## 2020-07-30 NOTE — PROGRESS NOTE ADULT - PROBLEM SELECTOR PROBLEM 7
----- Message from Juli Mccann LPN sent at 2/6/8764  4:52 PM EDT -----  Regarding: FW: Dr Cornell Ochoa    ----- Message -----  From: Tom Blade: 7/6/2020   4:34 PM EDT  To: [facility] Nurse Pool  Subject: Dr Gustavo Calvo first and last name:  Lucio Sorenson, pt's     Reason for call:said after evaluating  pt balance t feels she may need  rx to put in a request for a shower chair , a grab bar and a rollator walker with a seat      Callback required yes/no and why:yes to confirm the request or any questions      Best contact number(s):[phone]      Details to clarify the request:  Since Dr Gisell Sanches will be leaving, she would like this request to go to the covering provider who will be taking her place ,if you do not have any local medical store suppliers can give her a call and she could look on the website     Hans Ferguson
Pt notified to contact PCP
Syncope

## 2020-07-30 NOTE — PROGRESS NOTE ADULT - SUBJECTIVE AND OBJECTIVE BOX
Patient is a 60y old  Male who presents with a chief complaint of syncope (28 Jul 2020 11:12)      INTERVAL HPI/OVERNIGHT EVENTS: no events     MEDICATIONS  (STANDING):  aspirin enteric coated 81 milliGRAM(s) Oral daily  atorvastatin 40 milliGRAM(s) Oral at bedtime  benzocaine 15 mG/menthol 3.6 mG (Sugar-Free) Lozenge 1 Lozenge Oral four times a day  dextrose 5%. 1000 milliLiter(s) (50 mL/Hr) IV Continuous <Continuous>  dextrose 50% Injectable 12.5 Gram(s) IV Push once  dextrose 50% Injectable 25 Gram(s) IV Push once  dextrose 50% Injectable 25 Gram(s) IV Push once  enoxaparin Injectable 40 milliGRAM(s) SubCutaneous daily  insulin glargine Injectable (LANTUS) 6 Unit(s) SubCutaneous at bedtime  insulin lispro (HumaLOG) corrective regimen sliding scale   SubCutaneous three times a day before meals  metFORMIN 1000 milliGRAM(s) Oral two times a day with meals  midodrine 5 milliGRAM(s) Oral every 8 hours    MEDICATIONS  (PRN):  acetaminophen   Tablet .. 650 milliGRAM(s) Oral every 6 hours PRN Moderate Pain (4 - 6)  dextrose 40% Gel 15 Gram(s) Oral once PRN Blood Glucose LESS THAN 70 milliGRAM(s)/deciliter  glucagon  Injectable 1 milliGRAM(s) IntraMuscular once PRN Glucose LESS THAN 70 milligrams/deciliter      Allergies    No Known Allergies    Intolerances         Vital Signs Last 24 Hrs  T(C): 36.5 (30 Jul 2020 10:43), Max: 36.9 (30 Jul 2020 04:52)  T(F): 97.7 (30 Jul 2020 10:43), Max: 98.5 (30 Jul 2020 04:52)  HR: 79 (30 Jul 2020 10:43) (74 - 84)  BP: 108/68 (30 Jul 2020 10:43) (107/64 - 118/69)  BP(mean): --  RR: 16 (30 Jul 2020 10:43) (16 - 18)  SpO2: 98% (30 Jul 2020 10:43) (96% - 99%)    PHYSICAL EXAM:  GENERAL: NAD, well-groomed, well-developed  HEAD:  Atraumatic, Normocephalic  EYES: EOMI, PERRLA, conjunctiva and sclera clear  ENMT: No tonsillar erythema, exudates, or enlargement; Moist mucous membranes, Good dentition, No lesions  NECK: Supple, No JVD, Normal thyroid  NERVOUS SYSTEM:  Alert & Oriented X3, Good concentration; Motor Strength 5/5 B/L upper and lower extremities; DTRs 2+ intact and symmetric  CHEST/LUNG: Clear to percussion bilaterally; No rales, rhonchi, wheezing, or rubs  HEART: Regular rate and rhythm; No murmurs, rubs, or gallops  ABDOMEN: Soft, Nontender, Nondistended; Bowel sounds present  EXTREMITIES:  2+ Peripheral Pulses, No clubbing, cyanosis, or edema  LYMPH: No lymphadenopathy noted  SKIN: No rashes or lesions    LABS:              CAPILLARY BLOOD GLUCOSE      POCT Blood Glucose.: 147 mg/dL (30 Jul 2020 09:02)  POCT Blood Glucose.: 157 mg/dL (29 Jul 2020 21:23)  POCT Blood Glucose.: 105 mg/dL (29 Jul 2020 16:30)      RADIOLOGY & ADDITIONAL TESTS:    Imaging Personally Reviewed:  [ X] YES  [ ] NO    Consultant(s) Notes Reviewed:  [ X] YES  [ ] NO    Care Discussed with Consultants/Other Providers [X ] YES  [ ] NO

## 2020-07-30 NOTE — PROGRESS NOTE ADULT - ASSESSMENT
weight loss and cough with + QF gold but esr normal???  hiv test neg   neg  millie, anca   sp  bronchoscopy ;  not toxic appearing  will be discussed with JOHANNE re discharge plan   afb is not Mtb and not kirsten per micro  mantain isolation   discussed with infection control today   discussed with path today   they have the specimen not processed yet   await path   discharge plan without TB drugs   outpt follow up to decide if treatment needed for non tuberculous mycobacteria once we know what it is weight loss and cough with + QF gold but esr normal???  hiv test neg   neg  millie, anca   sp  bronchoscopy ;  not toxic appearing  will be discussed with JOHANNE re discharge plan   afb is not Mtb and not kirsten per micro  mantain isolation   discussed with infection control today   discussed with path today   they have the specimen not processed yet   await path   discharge plan without TB drugs   outpt follow up to decide if treatment needed for non tuberculous mycobacteria once we know what it is   infectiuos disease wise clear for discharge   discussed with daughter who he lives with   patient has always been small   daughter told to keep patient home in a seperate room till we have ID of bronch  repeat CT in 3 months   discussed with dr luis saeed   very low suspicion for TB as well  esr very low   patient has no signs and symptoms any more  no cough   not short of breath   no hemoptyses fevers night sweats here  tentative discharge am

## 2020-07-30 NOTE — PROGRESS NOTE ADULT - SUBJECTIVE AND OBJECTIVE BOX
INTERVAL HPI:  60 yr male, reports being  diagnosed of latent TB(not clear how) and started on Rifampin and Isoniazid 3 months ago while in [facility]. Reports had cough and fever in April.  [place]ives in [place], went to [facility] in January but could not return due to COVID.  Came to [place] 2 weeks ago,  daughter noticed 20 pound  wt loss, weakness, poor appetite, constantly thirsty, increased urination. They attributed it to the TB medications Took  him to  PMD, who recommended that he go to ED, passed out-( slumped over while sitting in a chair ) and did not respond for couple of minutes.  In ED noted severe hyperglycemia(new diagnosis of DM) but not in DKA. IVF and Insulin given. (08 Jul 2020 16:55).  AS above had cough and fever in April which improved after getting started on INH+ Rifampin.   Denied fever, chills,  cough , sputum production or night sweats.  07/24/20: Bronchoscopy with BAL and biopsy.    OVERNIGHT EVENTS:  Comfortable in chair.    Vital Signs Last 24 Hrs  T(C): 36.5 (30 Jul 2020 10:43), Max: 37.1 (29 Jul 2020 12:00)  T(F): 97.7 (30 Jul 2020 10:43), Max: 98.8 (29 Jul 2020 12:00)  HR: 79 (30 Jul 2020 10:43) (74 - 91)  BP: 108/68 (30 Jul 2020 10:43) (104/64 - 118/69)  BP(mean): --  RR: 16 (30 Jul 2020 10:43) (16 - 18)  SpO2: 98% (30 Jul 2020 10:43) (96% - 99%)    PHYSICAL EXAM:  GEN:         Awake, responsive and comfortable.  HEENT:    Normal.    RESP:        no distress.  CVS:           Regular rate and rhythm.   ABD:         Soft, non-tender, non-distended;     MEDICATIONS  (STANDING):  aspirin enteric coated 81 milliGRAM(s) Oral daily  atorvastatin 40 milliGRAM(s) Oral at bedtime  benzocaine 15 mG/menthol 3.6 mG (Sugar-Free) Lozenge 1 Lozenge Oral four times a day  dextrose 5%. 1000 milliLiter(s) (50 mL/Hr) IV Continuous <Continuous>  dextrose 50% Injectable 12.5 Gram(s) IV Push once  dextrose 50% Injectable 25 Gram(s) IV Push once  dextrose 50% Injectable 25 Gram(s) IV Push once  enoxaparin Injectable 40 milliGRAM(s) SubCutaneous daily  insulin glargine Injectable (LANTUS) 6 Unit(s) SubCutaneous at bedtime  insulin lispro (HumaLOG) corrective regimen sliding scale   SubCutaneous three times a day before meals  metFORMIN 1000 milliGRAM(s) Oral two times a day with meals  midodrine 5 milliGRAM(s) Oral every 8 hours    MEDICATIONS  (PRN):  acetaminophen   Tablet .. 650 milliGRAM(s) Oral every 6 hours PRN Moderate Pain (4 - 6)  dextrose 40% Gel 15 Gram(s) Oral once PRN Blood Glucose LESS THAN 70 milliGRAM(s)/deciliter  glucagon  Injectable 1 milliGRAM(s) IntraMuscular once PRN Glucose LESS THAN 70 milligrams/deciliter    07-25 @ 01:10  pH: 7.39  pCO2: 42  pO2: 198  SaO2: 100    ASSESSMENT AND PLAN:  ·	RUL opacities .  ·	AFB growing in one of  broth culture( not MTB or ARELY).  ·	Positive QuantiFeron Gold and weight loss.  ·	Newly diagnosed DM  ·	Hyperglycemia.    Was on INH+ Rifampin for about 4 months which were started in [facility] for presumably latent TB( had cough and fever at that time).  Admitted this time with new onset DM and weight loss. Chest CT with RUL opacities. One of the induced sputum grew AFB from broth culture( not PTB or ARELY) per microbiology. INH+ Rifampin stopped by ID to increase yield.  Sedrate less than 5 x 2.    HIV negative.  ACE level normal.  DANIELLE, C-ANCA, P- ANCA  negative.  Hepatitis negative.  06/24/20: BAL smear negative for AFB.    Final  identification of AFB from induced sputum is still awaited.  BAL fluid AFB smear negative, cultures awaited.

## 2020-07-30 NOTE — PROGRESS NOTE ADULT - PROBLEM SELECTOR PROBLEM 4
[phone]  
Hyperglycemia due to diabetes mellitus
Hyponatremia with normal extracellular fluid volume
Hyperglycemia due to diabetes mellitus

## 2020-07-30 NOTE — PROGRESS NOTE ADULT - SUBJECTIVE AND OBJECTIVE BOX
Patient is a 60y old  Male who presents with a chief complaint of syncope (28 Jul 2020 11:12)      INTERVAL HPI/OVERNIGHT EVENTS:  pt with no complaints  eating well good appetite    MEDICATIONS  (STANDING):  aspirin enteric coated 81 milliGRAM(s) Oral daily  atorvastatin 40 milliGRAM(s) Oral at bedtime  benzocaine 15 mG/menthol 3.6 mG (Sugar-Free) Lozenge 1 Lozenge Oral four times a day  dextrose 5%. 1000 milliLiter(s) (50 mL/Hr) IV Continuous <Continuous>  dextrose 50% Injectable 12.5 Gram(s) IV Push once  dextrose 50% Injectable 25 Gram(s) IV Push once  dextrose 50% Injectable 25 Gram(s) IV Push once  enoxaparin Injectable 40 milliGRAM(s) SubCutaneous daily  insulin glargine Injectable (LANTUS) 6 Unit(s) SubCutaneous at bedtime  insulin lispro (HumaLOG) corrective regimen sliding scale   SubCutaneous three times a day before meals  metFORMIN 1000 milliGRAM(s) Oral two times a day with meals  midodrine 5 milliGRAM(s) Oral every 8 hours    MEDICATIONS  (PRN):  acetaminophen   Tablet .. 650 milliGRAM(s) Oral every 6 hours PRN Moderate Pain (4 - 6)  dextrose 40% Gel 15 Gram(s) Oral once PRN Blood Glucose LESS THAN 70 milliGRAM(s)/deciliter  glucagon  Injectable 1 milliGRAM(s) IntraMuscular once PRN Glucose LESS THAN 70 milligrams/deciliter      REVIEW OF SYSTEMS:  CONSTITUTIONAL: No fever, weight loss, or fatigue  RESPIRATORY: No cough, wheezing, chills or hemoptysis; No shortness of breath  CARDIOVASCULAR: No chest pain, palpitations, dizziness, or leg swelling  GASTROINTESTINAL: No abdominal or epigastric pain. No nausea, vomiting, or hematemesis; No diarrhea or constipation. No melena or hematochezia.  ENDOCRINE: No heat or cold intolerance; No hair loss      Vital Signs Last 24 Hrs  T(C): 36.9 (30 Jul 2020 04:52), Max: 37.1 (29 Jul 2020 12:00)  T(F): 98.5 (30 Jul 2020 04:52), Max: 98.8 (29 Jul 2020 12:00)  HR: 74 (30 Jul 2020 04:52) (74 - 91)  BP: 107/64 (30 Jul 2020 04:52) (104/64 - 118/69)  BP(mean): --  RR: 18 (30 Jul 2020 04:52) (16 - 18)  SpO2: 98% (30 Jul 2020 04:52) (98% - 99%)    PHYSICAL EXAM:  GENERAL: NAD, well-groomed, well-developed        LABS:              CAPILLARY BLOOD GLUCOSE      POCT Blood Glucose.: 157 mg/dL (29 Jul 2020 21:23)  POCT Blood Glucose.: 105 mg/dL (29 Jul 2020 16:30)  POCT Blood Glucose.: 117 mg/dL (29 Jul 2020 11:24)    Lipid panel:               RADIOLOGY & ADDITIONAL TESTS:

## 2020-07-30 NOTE — PROGRESS NOTE ADULT - PROBLEM SELECTOR PROBLEM 2
Severe protein-calorie malnutrition
Syncope
Severe protein-calorie malnutrition

## 2020-07-30 NOTE — PROGRESS NOTE ADULT - ASSESSMENT
60 yr old with Latent TB from [facility] with wt loss, constant thirst, poor appetite, weakness and increased urination .    (I used creole speaking audio interpretor daily for my communication with the patient since 7/15/20)    R/O Pulm TB:  - H/O latent TB on meds since march  - As per daughter no H/o COVID-19, Neg Ab  - CT with rt upper lobe infiltrate  - Now on rifampin, and INH regime, held now per ID prior bronch  - Sputum cx (broth) showed AFB + bacilli 1 of 3  presumed not ARELY or MTb. per ID note on 7/25/2020 will need to f/u JOHANNE  - Positive QuantiFeron, Neg HIV  -s/p  Bronch on Friday 7/24/2020 xfer to ICU for monitoring  downgraded Sunday 7/26/2020 . BAL no AFB ?????   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  controlled blood sugars. cont current management.   - DKA ruled out  - Hb A1c 10.6  Endo on board.     Pseudohyponatremia: resolved       Severe protein-calorie malnutrition:  - Nutritional supplements.       MERCEDES:  - Resolved  - B/L non obstructing stone, outpt follow up     Headache. Cont prn tylenol.     Blurred vision. Needs outpatient Ophthalmologist follow up.    DVT ppx resume       d/w niece via phone and acted as

## 2020-07-30 NOTE — PROGRESS NOTE ADULT - PROBLEM SELECTOR PROBLEM 3
TB (pulmonary tuberculosis)

## 2020-07-30 NOTE — PROGRESS NOTE ADULT - PROBLEM/PLAN-7
Patient: Miller Edwards    Procedure(s):  Right cataract removal with implant - Wound Class: I-Clean    Diagnosis: Right cataract  Diagnosis Additional Information: No value filed.    Anesthesia Type:   MAC     Note:  Airway :Room Air  Patient transferred to:Phase II        Vitals: (Last set prior to Anesthesia Care Transfer)    CRNA VITALS  4/27/2017 0835 - 4/27/2017 0908      4/27/2017             Pulse: 56    SpO2: 98 %                Electronically Signed By: LUL Lara CRNA  April 27, 2017  9:08 AM  
DISPLAY PLAN FREE TEXT

## 2020-07-30 NOTE — PROGRESS NOTE ADULT - REASON FOR ADMISSION
syncope
Mr Faith a a 60 y old man with hx quantiferon gold now with weight loss   has a hx recently diagnosed diabetes
dyspnea, syncope
syncope
tb
cough
p
p
tb
tb
near syncope

## 2020-07-31 ENCOUNTER — TRANSCRIPTION ENCOUNTER (OUTPATIENT)
Age: 60
End: 2020-07-31

## 2020-07-31 VITALS
RESPIRATION RATE: 17 BRPM | SYSTOLIC BLOOD PRESSURE: 110 MMHG | TEMPERATURE: 98 F | OXYGEN SATURATION: 100 % | DIASTOLIC BLOOD PRESSURE: 68 MMHG | HEART RATE: 84 BPM

## 2020-07-31 PROCEDURE — 99239 HOSP IP/OBS DSCHRG MGMT >30: CPT

## 2020-07-31 RX ORDER — SITAGLIPTIN 50 MG/1
1 TABLET, FILM COATED ORAL
Qty: 30 | Refills: 0
Start: 2020-07-31 | End: 2020-08-29

## 2020-07-31 RX ORDER — ASPIRIN/CALCIUM CARB/MAGNESIUM 324 MG
1 TABLET ORAL
Qty: 30 | Refills: 0
Start: 2020-07-31 | End: 2020-08-29

## 2020-07-31 RX ORDER — METFORMIN HYDROCHLORIDE 850 MG/1
1 TABLET ORAL
Qty: 60 | Refills: 0
Start: 2020-07-31 | End: 2020-08-29

## 2020-07-31 RX ORDER — ATORVASTATIN CALCIUM 80 MG/1
1 TABLET, FILM COATED ORAL
Qty: 30 | Refills: 0
Start: 2020-07-31 | End: 2020-08-29

## 2020-07-31 RX ORDER — ISOPROPYL ALCOHOL, BENZOCAINE .7; .06 ML/ML; ML/ML
1 SWAB TOPICAL
Qty: 100 | Refills: 1
Start: 2020-07-31 | End: 2020-09-18

## 2020-07-31 RX ORDER — PYRIDOXINE HCL (VITAMIN B6) 100 MG
1 TABLET ORAL
Qty: 0 | Refills: 0 | DISCHARGE

## 2020-07-31 RX ADMIN — METFORMIN HYDROCHLORIDE 1000 MILLIGRAM(S): 850 TABLET ORAL at 07:47

## 2020-07-31 RX ADMIN — ENOXAPARIN SODIUM 40 MILLIGRAM(S): 100 INJECTION SUBCUTANEOUS at 11:13

## 2020-07-31 RX ADMIN — Medication 81 MILLIGRAM(S): at 11:13

## 2020-07-31 RX ADMIN — MIDODRINE HYDROCHLORIDE 5 MILLIGRAM(S): 2.5 TABLET ORAL at 13:33

## 2020-07-31 RX ADMIN — BENZOCAINE AND MENTHOL 1 LOZENGE: 5; 1 LIQUID ORAL at 06:24

## 2020-07-31 RX ADMIN — MIDODRINE HYDROCHLORIDE 5 MILLIGRAM(S): 2.5 TABLET ORAL at 06:24

## 2020-07-31 RX ADMIN — BENZOCAINE AND MENTHOL 1 LOZENGE: 5; 1 LIQUID ORAL at 11:13

## 2020-07-31 NOTE — DISCHARGE NOTE NURSING/CASE MANAGEMENT/SOCIAL WORK - NSDCPEPTSTRK_GEN_ALL_CORE
Risk factors for stroke/Need for follow up after discharge/Stroke support groups for patients, families, and friends/Prescribed medications/Stroke education booklet/Call 911 for stroke/Signs and symptoms of stroke/Stroke warning signs and symptoms

## 2020-07-31 NOTE — PROGRESS NOTE ADULT - PROVIDER SPECIALTY LIST ADULT
Critical Care
Endocrinology
Hospitalist
Infectious Disease
Pulmonology
Thoracic Surgery
Endocrinology
Pulmonology
Hospitalist
Endocrinology

## 2020-07-31 NOTE — DISCHARGE NOTE PROVIDER - CARE PROVIDER_API CALL
Asha Riddle  INFECTIOUS DISEASE  [address], [address]  [place] [address]  Phone: [phone]  Fax: [phone]  Follow Up Time:     Jenise Daniel  [facility]  [address]  [place] [address]  Phone: [phone]  Fax: [phone]  Follow Up Time:     Darling Gibbs  ENDOCRINOLOGY/METAB/DIABETES  [address], [address]  [place] [address]  Phone: [phone]  Fax: [phone]  Follow Up Time: Asha Riddle  INFECTIOUS DISEASE  [address], [address]  [place] [address]  Phone: [phone]  Fax: [phone]  Follow Up Time:     Jenise Daniel  [facility]  [address]  [place] [address]  Phone: [phone]  Fax: [phone]  Follow Up Time:     Darling Gibbs  ENDOCRINOLOGY/METAB/DIABETES  [address], [address]  [place] [address]  Phone: [phone]  Fax: [phone]  Follow Up Time:     Yadira Castaneda  NEUROLOGY  [address] [place] [address]  Phone: [phone]  Fax: [phone]  Follow Up Time:

## 2020-07-31 NOTE — DISCHARGE NOTE PROVIDER - HOSPITAL COURSE
60 yr old with Latent TB from [facility] with wt loss, constant thirst, poor appetite, weakness and increased urination .        (I used creole speaking audio interpretor daily for my communication with the patient since 7/15/20)        R/O Pulm TB:    - H/O latent TB on meds since march    - As per daughter no H/o COVID-19, Neg Ab    - CT with rt upper lobe infiltrate    - Now on rifampin, and INH regime, held now per ID prior bronch    - Sputum cx (broth) showed AFB + bacilli 1 of 3  presumed not ARELY or MTb. per ID note on 7/25/2020 will need to f/u JOHANNE    - Positive QuantiFeron, Neg HIV    -s/p  Bronch on Friday 7/24/2020 xfer to ICU for monitoring  downgraded Sunday 7/26/2020 . BAL no AFB ?????     7/27/2020 called infection control at 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  controlled blood sugars. cont current management.     - DKA ruled out    - Hb A1c 10.6    Endo on board.         Pseudohyponatremia: resolved             Severe protein-calorie malnutrition:    - Nutritional supplements.             MERCEDES:    - Resolved    - B/L non obstructing stone, outpt follow up         Headache. Cont prn tylenol.         Blurred vision. Needs outpatient Ophthalmologist follow up.        DVT ppx resume           d/w niece via phone             45min spent on dc plan

## 2020-07-31 NOTE — PROGRESS NOTE ADULT - PROBLEM SELECTOR PROBLEM 1
Hyperglycemia due to diabetes mellitus
Hyponatremia with normal extracellular fluid volume
Hyperglycemia due to diabetes mellitus
Hyponatremia with normal extracellular fluid volume

## 2020-07-31 NOTE — PROGRESS NOTE ADULT - PROBLEM SELECTOR PLAN 1
improving  while inpt continue lantus 6units  discontinue lispro 2units with meals   continue DOUGLAS TID-AC medium dose  increase metformin to 1000mg bid  will follow  DM education re fsbg monitoring and diet  in view if low insulin requirements  can be d/c off insulin on metformin 1000mg bid and januvia 100mg daily with outpt f/u with endocrine within 1-2wks
now hypoglycemia  decrease lantus to 8units  decrease lispro  to 2units with meals   continue DOUGLAS TID-AC  will follow
- likely secondary to COVID-19 (pt. was diagnosed with COVID in March  Aggressive hydration  Given poor PO intake Accuchecks Q 6 hrs for next 24-48 hrs  Lantus HS, HISS, Premeal Insulin. titrate as needed  Consider Endocrine consult  DM education
Continue with the same regimen while inpatient   decreased glucose toxicity   while inpatient Finger Sticks should be in 140-180 range, preferably <160
Continue with the same regimen while inpatient   finger sticks are in target blood glucose range
Continue with the same regimen while inpatient   need adjustment in Lantus and prandial lispro dose   while inpatient Finger Sticks should be in 140-180 range, preferably <160   Encourage more nutrition
Continue with the same regimen while inpatient   watch for any hypoglycemia   Encourage more nutrition
Continue with the same regimen while inpatient   while inpatient Finger Sticks should be in 140-180 range, preferably <160
Continue with the same regimen while inpatient   will not require much insulin/ diabetes medications upon discharge   can be discharged off insulin   Metformin alone may suffice
continue lantus 6units  continue lispro 2units with meals   continue DOUGLAS TID-AC medium dose  will follow  DM education re fsbg monitoring and diet  in view if low insulin requirements  can be d/c off insulin on metformin 500mg bid and januvia 100mg daily with outpt f/u with endocrine within 1-2wks
currently stable finger sticks   Continue with the same regimen while inpatient   but if finger sticks rise further, then add Metformin provided there are no contraindication   while inpatient Finger Sticks should be in 140-180 range, preferably <160
fluctuating glucose  decrease lantus to 6units  continue lispro 2units with meals   continue DOUGLAS TID-AC but change to medium dose  will follow  DM education re fsbg monitoring and diet  in view if low insulin requirements  can be d/c off insulin on metformin 500mg bid and januvia 100mg daily with outpt f/u with endocrine within 1-2wks
hyperglycemia better   Nutrition  also better   on low dose insulin   Continue with the same regimen while inpatient   while inpatient Finger Sticks should be in 140-180 range, preferably <160   adjust Lantus and prandial lispro dose to achieve target range blood glucose
hyperglycemia resolved   Continue with the same regimen while inpatient   patient can be discharged on the same regimen
hyperglycemia yesterday, previously controlled  continue lantus 6units  continue lispro 2units with meals   continue DOUGLAS TID-AC medium dose  add metformin 500mg bid  will follow  DM education re fsbg monitoring and diet  in view if low insulin requirements  can be d/c off insulin on metformin 500mg bid and januvia 100mg daily with outpt f/u with endocrine within 1-2wks
improved control  continue lantus 6units  continue lispro 2units with meals   continue DOUGLAS TID-AC medium dose  will follow  DM education re fsbg monitoring and diet  in view if low insulin requirements  can be d/c off insulin on metformin 500mg bid and januvia 100mg daily with outpt f/u with endocrine within 1-2wks
improved control  continue lantus 6units  continue lispro 2units with meals   continue DOUGLAS TID-AC medium dose  will follow  DM education re fsbg monitoring and diet  in view if low insulin requirements  can be d/c off insulin on metformin 500mg bid and januvia 100mg daily with outpt f/u with endocrine within 1-2wks
improved control (except once in the 300s, likely overate after being npo all day)  continue lantus 6units  continue lispro 2units with meals   continue DOUGLAS TID-AC medium dose  will follow  DM education re fsbg monitoring and diet  in view if low insulin requirements  can be d/c off insulin on metformin 500mg bid and januvia 100mg daily with outpt f/u with endocrine within 1-2wks
improving  discontinue lantus 6units  continue DOUGLAS TID-AC medium dose while inpt  continue metformin  1000mg bid  will follow  DM education re fsbg monitoring and diet  in view if low insulin requirements  can be d/c off insulin on metformin 1000mg bid and januvia 100mg daily with outpt f/u with endocrine within 1-2wks
improving  while inpt continue lantus 6units  continue lispro 2units with meals   continue DOUGLAS TID-AC medium dose  continue metformin 500mg bid  will follow  DM education re fsbg monitoring and diet  in view if low insulin requirements  can be d/c off insulin on metformin 1000mg bid and januvia 100mg daily with outpt f/u with endocrine within 1-2wks
now am hypoglycemia  decrease lantus to 20units  continue lispro 8units with meals   continue DOUGLAS

## 2020-07-31 NOTE — DISCHARGE NOTE NURSING/CASE MANAGEMENT/SOCIAL WORK - PATIENT PORTAL LINK FT
You can access the FollowMyHealth Patient Portal offered by [facility] by registering at the following website: [email or web address]. By joining [facility]’s FollowMyHealth portal, you will also be able to view your health information using other applications (apps) compatible with our system.

## 2020-07-31 NOTE — PROGRESS NOTE ADULT - SUBJECTIVE AND OBJECTIVE BOX
Patient is a 60y old  Male who presents with a chief complaint of tb (30 Jul 2020 17:50)      INTERVAL HPI/OVERNIGHT EVENTS:  no events overnight  NAD  d/c planning in progress    MEDICATIONS  (STANDING):  aspirin enteric coated 81 milliGRAM(s) Oral daily  atorvastatin 40 milliGRAM(s) Oral at bedtime  benzocaine 15 mG/menthol 3.6 mG (Sugar-Free) Lozenge 1 Lozenge Oral four times a day  dextrose 5%. 1000 milliLiter(s) (50 mL/Hr) IV Continuous <Continuous>  dextrose 50% Injectable 12.5 Gram(s) IV Push once  dextrose 50% Injectable 25 Gram(s) IV Push once  dextrose 50% Injectable 25 Gram(s) IV Push once  enoxaparin Injectable 40 milliGRAM(s) SubCutaneous daily  insulin lispro (HumaLOG) corrective regimen sliding scale   SubCutaneous three times a day before meals  metFORMIN 1000 milliGRAM(s) Oral two times a day with meals  midodrine 5 milliGRAM(s) Oral every 8 hours    MEDICATIONS  (PRN):  acetaminophen   Tablet .. 650 milliGRAM(s) Oral every 6 hours PRN Moderate Pain (4 - 6)  dextrose 40% Gel 15 Gram(s) Oral once PRN Blood Glucose LESS THAN 70 milliGRAM(s)/deciliter  glucagon  Injectable 1 milliGRAM(s) IntraMuscular once PRN Glucose LESS THAN 70 milligrams/deciliter      REVIEW OF SYSTEMS:  CONSTITUTIONAL: No fever, weight loss, or fatigue  RESPIRATORY: No cough, wheezing, chills or hemoptysis; No shortness of breath  CARDIOVASCULAR: No chest pain, palpitations, dizziness, or leg swelling  GASTROINTESTINAL: No abdominal or epigastric pain. No nausea, vomiting, or hematemesis; No diarrhea or constipation. No melena or hematochezia.      Vital Signs Last 24 Hrs  T(C): 36.8 (31 Jul 2020 05:42), Max: 37.1 (30 Jul 2020 23:25)  T(F): 98.2 (31 Jul 2020 05:42), Max: 98.7 (30 Jul 2020 23:25)  HR: 75 (31 Jul 2020 05:42) (75 - 97)  BP: 116/76 (31 Jul 2020 05:42) (106/65 - 116/77)  BP(mean): --  RR: 16 (31 Jul 2020 05:42) (16 - 18)  SpO2: 97% (31 Jul 2020 05:42) (97% - 98%)    PHYSICAL EXAM:  NAD      LABS:              CAPILLARY BLOOD GLUCOSE      POCT Blood Glucose.: 85 mg/dL (31 Jul 2020 07:50)  POCT Blood Glucose.: 135 mg/dL (30 Jul 2020 21:44)  POCT Blood Glucose.: 151 mg/dL (30 Jul 2020 16:52)  POCT Blood Glucose.: 164 mg/dL (30 Jul 2020 12:08)    Lipid panel:               RADIOLOGY & ADDITIONAL TESTS:

## 2020-07-31 NOTE — PROGRESS NOTE ADULT - SUBJECTIVE AND OBJECTIVE BOX
INTERVAL HPI:  60 yr male, reports being  diagnosed of latent TB(not clear how) and started on Rifampin and Isoniazid 3 months ago while in [facility]. Reports had cough and fever in April.  [place]ives in [place] 5 years, went to [facility] in January but could not return due to COVID.  Came to [place] 2 weeks ago,  daughter noticed 20 pound  wt loss, weakness, poor appetite, constantly thirsty, increased urination. They attributed it to the TB medications Took  him to  PMD, who recommended that he go to ED, passed out-( slumped over while sitting in a chair ) and did not respond for couple of minutes.  In ED noted severe hyperglycemia(new diagnosis of DM) but not in DKA. IVF and Insulin given. (08 Jul 2020 16:55).  AS above had cough and fever in April which improved after getting started on INH+ Rifampin.   Denied fever, chills,  cough , sputum production or night sweats.  07/24/20: Bronchoscopy with BAL and biopsy.    OVERNIGHT EVENTS:  Remains stable.    Vital Signs Last 24 Hrs  T(C): 36.8 (31 Jul 2020 11:20), Max: 37.1 (30 Jul 2020 23:25)  T(F): 98.2 (31 Jul 2020 11:20), Max: 98.7 (30 Jul 2020 23:25)  HR: 84 (31 Jul 2020 11:20) (75 - 97)  BP: 110/68 (31 Jul 2020 11:20) (106/65 - 116/77)  BP(mean): --  RR: 17 (31 Jul 2020 11:20) (16 - 18)  SpO2: 100% (31 Jul 2020 11:20) (97% - 100%)    PHYSICAL EXAM:  GEN:         Awake, responsive and comfortable.  HEENT:    Normal.    RESP:      no distress  CVS:             Regular rate and rhythm.   ABD:         Soft, non-tender, non-distended;     MEDICATIONS  (STANDING):  aspirin enteric coated 81 milliGRAM(s) Oral daily  atorvastatin 40 milliGRAM(s) Oral at bedtime  benzocaine 15 mG/menthol 3.6 mG (Sugar-Free) Lozenge 1 Lozenge Oral four times a day  dextrose 5%. 1000 milliLiter(s) (50 mL/Hr) IV Continuous <Continuous>  dextrose 50% Injectable 12.5 Gram(s) IV Push once  dextrose 50% Injectable 25 Gram(s) IV Push once  dextrose 50% Injectable 25 Gram(s) IV Push once  enoxaparin Injectable 40 milliGRAM(s) SubCutaneous daily  insulin lispro (HumaLOG) corrective regimen sliding scale   SubCutaneous three times a day before meals  metFORMIN 1000 milliGRAM(s) Oral two times a day with meals  midodrine 5 milliGRAM(s) Oral every 8 hours    MEDICATIONS  (PRN):  acetaminophen   Tablet .. 650 milliGRAM(s) Oral every 6 hours PRN Moderate Pain (4 - 6)  dextrose 40% Gel 15 Gram(s) Oral once PRN Blood Glucose LESS THAN 70 milliGRAM(s)/deciliter  glucagon  Injectable 1 milliGRAM(s) IntraMuscular once PRN Glucose LESS THAN 70 milligrams/deciliter    07-25 @ 01:10  pH: 7.39  pCO2: 42  pO2: 198  SaO2: 100    ASSESSMENT AND PLAN:  ·	RUL opacities .  ·	AFB growing in one of  broth culture( not MTB or MAC).  ·	Positive QuantiFeron Gold and weight loss.  ·	Newly diagnosed DM  ·	Hyperglycemia.    Was on INH+ Rifampin for about 4 months which were started in [facility] for presumably latent TB( had cough and fever at that time).  Admitted this time with new onset DM and weight loss. Chest CT with RUL opacities. One of the induced sputum grew AFB from broth culture( not PTB or ARELY) per microbiology. INH+ Rifampin stopped by ID to increase yield.  Sedrate less than 5 x 2.    HIV negative.  ACE level normal.  DANIELLE, C-ANCA, P- ANCA  negative.  Hepatitis negative.  06/24/20: BAL smear negative for AFB.    Final  identification of AFB from induced sputum is still awaited.  BAL fluid AFB smear negative, cultures awaited.    Discussed with Dr Riddle  yesterday, suspicion for MTB low as no symptoms, very low sedrate and sputum AFB  reported not to be PTB or MAC,  Being discharged home with instructions to stay home until final identification of sputum AFB.

## 2020-07-31 NOTE — DISCHARGE NOTE PROVIDER - NSDCMRMEDTOKEN_GEN_ALL_CORE_FT
aspirin 81 mg oral delayed release tablet: 1 tab(s) orally once a day  Januvia 100 mg oral tablet: 1 tab(s) orally once a day   Lipitor 20 mg oral tablet: 1 tab(s) orally once a day   metFORMIN 1000 mg oral tablet: 1 tab(s) orally 2 times a day (with meals)

## 2020-07-31 NOTE — DISCHARGE NOTE PROVIDER - NSDCCPCAREPLAN_GEN_ALL_CORE_FT
PRINCIPAL DISCHARGE DIAGNOSIS  Diagnosis: TB (pulmonary tuberculosis)  Assessment and Plan of Treatment: Follow up with Dr Riddle and Dr smith      SECONDARY DISCHARGE DIAGNOSES  Diagnosis: Weight loss  Assessment and Plan of Treatment:     Diagnosis: Diabetes  Assessment and Plan of Treatment: Follow with Dr Gibbs    Diagnosis: Syncope  Assessment and Plan of Treatment: PRINCIPAL DISCHARGE DIAGNOSIS  Diagnosis: TB (pulmonary tuberculosis)  Assessment and Plan of Treatment: Follow up with Dr Riddle and Dr smith      SECONDARY DISCHARGE DIAGNOSES  Diagnosis: Status post CVA  Assessment and Plan of Treatment: aspirin lipitor, follow with neurology    Diagnosis: Weight loss  Assessment and Plan of Treatment:     Diagnosis: Diabetes  Assessment and Plan of Treatment: Follow with Dr Gibbs    Diagnosis: Syncope  Assessment and Plan of Treatment: PRINCIPAL DISCHARGE DIAGNOSIS  Diagnosis: TB (pulmonary tuberculosis)  Assessment and Plan of Treatment: Follow up with Dr Riddle and Dr smith  Stay home in a seperate room until final cultures are resulted      SECONDARY DISCHARGE DIAGNOSES  Diagnosis: Status post CVA  Assessment and Plan of Treatment: aspirin lipitor, follow with neurology    Diagnosis: Weight loss  Assessment and Plan of Treatment:     Diagnosis: Diabetes  Assessment and Plan of Treatment: Follow with Dr Gibbs    Diagnosis: Syncope  Assessment and Plan of Treatment:

## 2020-07-31 NOTE — DISCHARGE NOTE PROVIDER - PROVIDER TOKENS
PROVIDER:[TOKEN:[[id number]]],PROVIDER:[TOKEN:[[id number]]],PROVIDER:[TOKEN:[[id number]]] PROVIDER:[TOKEN:[[id number]]],PROVIDER:[TOKEN:[[id number]]],PROVIDER:[TOKEN:[[id number]]],PROVIDER:[TOKEN:[[id number]]]

## 2020-08-03 DIAGNOSIS — R74.0 NONSPECIFIC ELEVATION OF LEVELS OF TRANSAMINASE AND LACTIC ACID DEHYDROGENASE [LDH]: ICD-10-CM

## 2020-08-03 DIAGNOSIS — D50.9 IRON DEFICIENCY ANEMIA, UNSPECIFIED: ICD-10-CM

## 2020-08-03 DIAGNOSIS — R91.8 OTHER NONSPECIFIC ABNORMAL FINDING OF LUNG FIELD: ICD-10-CM

## 2020-08-03 DIAGNOSIS — B94.8 SEQUELAE OF OTHER SPECIFIED INFECTIOUS AND PARASITIC DISEASES: ICD-10-CM

## 2020-08-03 DIAGNOSIS — E43 UNSPECIFIED SEVERE PROTEIN-CALORIE MALNUTRITION: ICD-10-CM

## 2020-08-03 DIAGNOSIS — Z11.59 ENCOUNTER FOR SCREENING FOR OTHER VIRAL DISEASES: ICD-10-CM

## 2020-08-03 DIAGNOSIS — R64 CACHEXIA: ICD-10-CM

## 2020-08-03 DIAGNOSIS — E11.65 TYPE 2 DIABETES MELLITUS WITH HYPERGLYCEMIA: ICD-10-CM

## 2020-08-03 DIAGNOSIS — I63.9 CEREBRAL INFARCTION, UNSPECIFIED: ICD-10-CM

## 2020-08-03 DIAGNOSIS — N17.9 ACUTE KIDNEY FAILURE, UNSPECIFIED: ICD-10-CM

## 2020-08-03 DIAGNOSIS — Z22.7 LATENT TUBERCULOSIS: ICD-10-CM

## 2020-08-03 DIAGNOSIS — R55 SYNCOPE AND COLLAPSE: ICD-10-CM

## 2020-08-03 DIAGNOSIS — I95.2 HYPOTENSION DUE TO DRUGS: ICD-10-CM

## 2020-08-03 DIAGNOSIS — I97.88 OTHER INTRAOPERATIVE COMPLICATIONS OF THE CIRCULATORY SYSTEM, NOT ELSEWHERE CLASSIFIED: ICD-10-CM

## 2020-08-03 DIAGNOSIS — E86.0 DEHYDRATION: ICD-10-CM

## 2020-08-03 DIAGNOSIS — I48.91 UNSPECIFIED ATRIAL FIBRILLATION: ICD-10-CM

## 2020-08-03 DIAGNOSIS — N20.0 CALCULUS OF KIDNEY: ICD-10-CM

## 2020-08-03 DIAGNOSIS — T36.6X5A ADVERSE EFFECT OF RIFAMPICINS, INITIAL ENCOUNTER: ICD-10-CM

## 2020-08-03 DIAGNOSIS — E11.649 TYPE 2 DIABETES MELLITUS WITH HYPOGLYCEMIA WITHOUT COMA: ICD-10-CM

## 2020-08-03 DIAGNOSIS — E87.1 HYPO-OSMOLALITY AND HYPONATREMIA: ICD-10-CM

## 2020-08-03 DIAGNOSIS — T41.205A ADVERSE EFFECT OF UNSPECIFIED GENERAL ANESTHETICS, INITIAL ENCOUNTER: ICD-10-CM

## 2020-08-29 LAB
CULTURE RESULTS: SIGNIFICANT CHANGE UP
NIGHT BLUE STAIN TISS: SIGNIFICANT CHANGE UP
SPECIMEN SOURCE: SIGNIFICANT CHANGE UP

## 2021-03-12 NOTE — PROGRESS NOTE ADULT - ASSESSMENT
60 yr old with Latent TB from [facility] with wt loss, constant thirst, poor appetite, weakness and increased urination .    (I used creole speaking audio interpretor daily for my communication with the patient since 7/15/20)    R/O Pulm TB:  - H/O latent TB on meds since march  - As per daughter no H/o COVID-19, Neg Ab  - CT with rt upper lobe infiltrate  - Now on rifampin, and INH regime, held now per ID prior bronch  - Sputum cx (broth) showed AFB + bacilli 1 of 3  presumed not ARELY or MTb. per ID note on 7/25/2020 will need to f/u JOHANNE  - Positive QuantiFeron, Neg HIV  -s/p  Bronch on Friday 7/24/2020 xfer to ICU for monitoring  downgraded Sunday 7/26/2020 . BAL no AFB ?????   7/27/2020 called infection control at [facility] who instructed me to call Core lab held on at core lab for 30 min  to no avail, advised JEEVAN Ojeda to follow up when she is able       Syncope on admission  - Likely vasovagal   - In the setting of dehydration uncontrolled DM, viral illness  - PE ruled out  - CT head neg  - 2D echo with preserved EF, no acute path    DM type 2.  controlled blood sugars. cont current management.   - DKA ruled out  - Hb A1c 10.6  Endo on board.     Pseudohyponatremia: resolved       Severe protein-calorie malnutrition:  - Nutritional supplements.       MERCEDES:  - Resolved  - B/L non obstructing stone, outpt follow up     Headache. Cont prn tylenol.     Blurred vision. Needs outpatient Ophthalmologist follow up.    DVT ppx resume No indicators present

## 2021-04-15 NOTE — PROGRESS NOTE ADULT - SUBJECTIVE AND OBJECTIVE BOX
HPI:  60 yr old diagnosed of latent TB and started on Rifampin and Isoniazid 3 months ago while in [facility]. He came to [place] 2 weeks ago and the dgtr has noticed 20 pds wt loss, weakness, poor appetite, constantly thirsty, increased urination. They attributed it to the TB medications SE. They did take him to their PMD, who recommended that he go to ED. However patient passed out- slumped over while sitting in a chair and did not respond for couple of minutes. Upon shaking after he came awake and said he was fine. Dgtr brought him to ED with concerns.     In ED noted severe hyperglycemia and not in DKA. IVF and Insulin given. (08 Jul 2020 16:55)      Allergies    No Known Allergies    Intolerances        MEDICATIONS  (STANDING):  dextrose 5%. 1000 milliLiter(s) (50 mL/Hr) IV Continuous <Continuous>  dextrose 50% Injectable 12.5 Gram(s) IV Push once  dextrose 50% Injectable 25 Gram(s) IV Push once  dextrose 50% Injectable 25 Gram(s) IV Push once  enoxaparin Injectable 40 milliGRAM(s) SubCutaneous daily  insulin glargine Injectable (LANTUS) 6 Unit(s) SubCutaneous at bedtime  insulin lispro (HumaLOG) corrective regimen sliding scale   SubCutaneous three times a day before meals  insulin lispro Injectable (HumaLOG) 2 Unit(s) SubCutaneous three times a day before meals  pyridoxine 50 milliGRAM(s) Oral daily    MEDICATIONS  (PRN):  acetaminophen   Tablet .. 650 milliGRAM(s) Oral every 6 hours PRN Moderate Pain (4 - 6)  dextrose 40% Gel 15 Gram(s) Oral once PRN Blood Glucose LESS THAN 70 milliGRAM(s)/deciliter  glucagon  Injectable 1 milliGRAM(s) IntraMuscular once PRN Glucose LESS THAN 70 milligrams/deciliter      REVIEW OF SYSTEMS:    CONSTITUTIONAL: No fever, chills, weight loss, or fatigue  HEENT: No sore throat, runny nose, ear ache  RESPIRATORY: No cough, wheezing, No shortness of breath  CARDIOVASCULAR: No chest pain, palpitations, dizziness  GASTROINTESTINAL: No abdominal pain. No nausea, vomiting, diarrhea  GENITOURINARY: No dysuria, increase frequency, hematuria, or incontinence  NEUROLOGICAL: No headaches, memory loss, loss of strength, numbness, or tremors, no weakness  EXTREMITY: No pedal edema BLE  SKIN: No itching, burning, rashes, or lesions     VITAL SIGNS:  T(C): 37.1 (07-21-20 @ 13:25), Max: 37.1 (07-21-20 @ 13:25)  T(F): 98.8 (07-21-20 @ 13:25), Max: 98.8 (07-21-20 @ 13:25)  HR: 60 (07-21-20 @ 13:25) (60 - 79)  BP: 112/64 (07-21-20 @ 13:25) (100/62 - 112/64)  RR: 16 (07-21-20 @ 13:25) (16 - 18)  SpO2: 99% (07-21-20 @ 13:25) (97% - 99%)  Wt(kg): --    PHYSICAL EXAM:    GENERAL: not in any distress, Cachectic  HEENT: Neck is supple, normocephalic, atraumatic   CHEST/LUNG: Clear to percussion bilaterally; No rales, rhonchi, wheezing  HEART: Regular rate and rhythm; No murmurs, rubs, or gallops  ABDOMEN: Soft, Nontender, Nondistended; Bowel sounds present, no rebound   EXTREMITIES:  2+ Peripheral Pulses, No clubbing, cyanosis, or edema  GENITOURINARY:   SKIN: No rashes or lesions  BACK: no pressor sore   NERVOUS SYSTEM:  Alert & Oriented X3, Good concentration  PSYCH: normal affect     LABS:       Radiology: Yes

## 2021-07-25 ENCOUNTER — INPATIENT (INPATIENT)
Facility: HOSPITAL | Age: 61
LOS: 8 days | Discharge: HOME | End: 2021-08-03
Attending: FAMILY MEDICINE | Admitting: FAMILY MEDICINE
Payer: MEDICAID

## 2021-07-25 VITALS
OXYGEN SATURATION: 95 % | TEMPERATURE: 102 F | HEIGHT: 68 IN | WEIGHT: 110.89 LBS | SYSTOLIC BLOOD PRESSURE: 147 MMHG | RESPIRATION RATE: 22 BRPM | DIASTOLIC BLOOD PRESSURE: 69 MMHG | HEART RATE: 116 BPM

## 2021-07-25 DIAGNOSIS — E11.9 TYPE 2 DIABETES MELLITUS WITHOUT COMPLICATIONS: ICD-10-CM

## 2021-07-25 DIAGNOSIS — Z29.9 ENCOUNTER FOR PROPHYLACTIC MEASURES, UNSPECIFIED: ICD-10-CM

## 2021-07-25 DIAGNOSIS — E78.5 HYPERLIPIDEMIA, UNSPECIFIED: ICD-10-CM

## 2021-07-25 DIAGNOSIS — J18.9 PNEUMONIA, UNSPECIFIED ORGANISM: ICD-10-CM

## 2021-07-25 LAB
ALBUMIN SERPL ELPH-MCNC: 3.4 G/DL — SIGNIFICANT CHANGE UP (ref 3.3–5)
ALP SERPL-CCNC: 177 U/L — HIGH (ref 40–120)
ALT FLD-CCNC: 63 U/L — SIGNIFICANT CHANGE UP (ref 12–78)
ANION GAP SERPL CALC-SCNC: 6 MMOL/L — SIGNIFICANT CHANGE UP (ref 5–17)
APPEARANCE UR: CLEAR — SIGNIFICANT CHANGE UP
APTT BLD: 35 SEC — SIGNIFICANT CHANGE UP (ref 27.5–35.5)
AST SERPL-CCNC: 61 U/L — HIGH (ref 15–37)
BASOPHILS # BLD AUTO: 0.06 K/UL — SIGNIFICANT CHANGE UP (ref 0–0.2)
BASOPHILS NFR BLD AUTO: 0.6 % — SIGNIFICANT CHANGE UP (ref 0–2)
BILIRUB SERPL-MCNC: 1.1 MG/DL — SIGNIFICANT CHANGE UP (ref 0.2–1.2)
BILIRUB UR-MCNC: NEGATIVE — SIGNIFICANT CHANGE UP
BUN SERPL-MCNC: 8 MG/DL — SIGNIFICANT CHANGE UP (ref 7–23)
CALCIUM SERPL-MCNC: 9 MG/DL — SIGNIFICANT CHANGE UP (ref 8.5–10.1)
CHLORIDE SERPL-SCNC: 98 MMOL/L — SIGNIFICANT CHANGE UP (ref 96–108)
CO2 SERPL-SCNC: 27 MMOL/L — SIGNIFICANT CHANGE UP (ref 22–31)
COLOR SPEC: YELLOW — SIGNIFICANT CHANGE UP
CREAT SERPL-MCNC: 1.01 MG/DL — SIGNIFICANT CHANGE UP (ref 0.5–1.3)
DIFF PNL FLD: NEGATIVE — SIGNIFICANT CHANGE UP
EOSINOPHIL # BLD AUTO: 0.03 K/UL — SIGNIFICANT CHANGE UP (ref 0–0.5)
EOSINOPHIL NFR BLD AUTO: 0.3 % — SIGNIFICANT CHANGE UP (ref 0–6)
FLUAV AG NPH QL: SIGNIFICANT CHANGE UP
FLUBV AG NPH QL: SIGNIFICANT CHANGE UP
GLUCOSE BLDC GLUCOMTR-MCNC: 168 MG/DL — HIGH (ref 70–99)
GLUCOSE SERPL-MCNC: 123 MG/DL — HIGH (ref 70–99)
GLUCOSE UR QL: NEGATIVE MG/DL — SIGNIFICANT CHANGE UP
HCT VFR BLD CALC: 33.5 % — LOW (ref 39–50)
HGB BLD-MCNC: 11.6 G/DL — LOW (ref 13–17)
IMM GRANULOCYTES NFR BLD AUTO: 0.7 % — SIGNIFICANT CHANGE UP (ref 0–1.5)
INR BLD: 1.34 RATIO — HIGH (ref 0.88–1.16)
KETONES UR-MCNC: NEGATIVE — SIGNIFICANT CHANGE UP
LACTATE SERPL-SCNC: 1.5 MMOL/L — SIGNIFICANT CHANGE UP (ref 0.7–2)
LEUKOCYTE ESTERASE UR-ACNC: NEGATIVE — SIGNIFICANT CHANGE UP
LYMPHOCYTES # BLD AUTO: 0.59 K/UL — LOW (ref 1–3.3)
LYMPHOCYTES # BLD AUTO: 6.1 % — LOW (ref 13–44)
MCHC RBC-ENTMCNC: 22.9 PG — LOW (ref 27–34)
MCHC RBC-ENTMCNC: 34.6 GM/DL — SIGNIFICANT CHANGE UP (ref 32–36)
MCV RBC AUTO: 66.2 FL — LOW (ref 80–100)
MONOCYTES # BLD AUTO: 1.16 K/UL — HIGH (ref 0–0.9)
MONOCYTES NFR BLD AUTO: 11.9 % — SIGNIFICANT CHANGE UP (ref 2–14)
NEUTROPHILS # BLD AUTO: 7.8 K/UL — HIGH (ref 1.8–7.4)
NEUTROPHILS NFR BLD AUTO: 80.4 % — HIGH (ref 43–77)
NITRITE UR-MCNC: NEGATIVE — SIGNIFICANT CHANGE UP
NRBC # BLD: 0 /100 WBCS — SIGNIFICANT CHANGE UP (ref 0–0)
PH UR: 6.5 — SIGNIFICANT CHANGE UP (ref 5–8)
PLATELET # BLD AUTO: 507 K/UL — HIGH (ref 150–400)
POTASSIUM SERPL-MCNC: 4.6 MMOL/L — SIGNIFICANT CHANGE UP (ref 3.5–5.3)
POTASSIUM SERPL-SCNC: 4.6 MMOL/L — SIGNIFICANT CHANGE UP (ref 3.5–5.3)
PROT SERPL-MCNC: 9.2 GM/DL — HIGH (ref 6–8.3)
PROT UR-MCNC: NEGATIVE MG/DL — SIGNIFICANT CHANGE UP
PROTHROM AB SERPL-ACNC: 15.3 SEC — HIGH (ref 10.6–13.6)
RBC # BLD: 5.06 M/UL — SIGNIFICANT CHANGE UP (ref 4.2–5.8)
RBC # FLD: 18.1 % — HIGH (ref 10.3–14.5)
SARS-COV-2 RNA SPEC QL NAA+PROBE: SIGNIFICANT CHANGE UP
SODIUM SERPL-SCNC: 131 MMOL/L — LOW (ref 135–145)
SP GR SPEC: 1 — LOW (ref 1.01–1.02)
TROPONIN I SERPL-MCNC: <.015 NG/ML — SIGNIFICANT CHANGE UP (ref 0.01–0.04)
UROBILINOGEN FLD QL: NEGATIVE MG/DL — SIGNIFICANT CHANGE UP
WBC # BLD: 9.71 K/UL — SIGNIFICANT CHANGE UP (ref 3.8–10.5)
WBC # FLD AUTO: 9.71 K/UL — SIGNIFICANT CHANGE UP (ref 3.8–10.5)

## 2021-07-25 PROCEDURE — 99222 1ST HOSP IP/OBS MODERATE 55: CPT

## 2021-07-25 PROCEDURE — 71275 CT ANGIOGRAPHY CHEST: CPT | Mod: 26

## 2021-07-25 PROCEDURE — 99221 1ST HOSP IP/OBS SF/LOW 40: CPT

## 2021-07-25 PROCEDURE — 71045 X-RAY EXAM CHEST 1 VIEW: CPT | Mod: 26

## 2021-07-25 PROCEDURE — 99285 EMERGENCY DEPT VISIT HI MDM: CPT

## 2021-07-25 RX ORDER — ATORVASTATIN CALCIUM 80 MG/1
20 TABLET, FILM COATED ORAL AT BEDTIME
Refills: 0 | Status: DISCONTINUED | OUTPATIENT
Start: 2021-07-25 | End: 2021-08-03

## 2021-07-25 RX ORDER — INSULIN LISPRO 100/ML
VIAL (ML) SUBCUTANEOUS
Refills: 0 | Status: DISCONTINUED | OUTPATIENT
Start: 2021-07-25 | End: 2021-08-03

## 2021-07-25 RX ORDER — DEXTROSE 50 % IN WATER 50 %
25 SYRINGE (ML) INTRAVENOUS ONCE
Refills: 0 | Status: DISCONTINUED | OUTPATIENT
Start: 2021-07-25 | End: 2021-08-03

## 2021-07-25 RX ORDER — ENOXAPARIN SODIUM 100 MG/ML
40 INJECTION SUBCUTANEOUS DAILY
Refills: 0 | Status: DISCONTINUED | OUTPATIENT
Start: 2021-07-25 | End: 2021-07-26

## 2021-07-25 RX ORDER — SODIUM CHLORIDE 9 MG/ML
1000 INJECTION, SOLUTION INTRAVENOUS
Refills: 0 | Status: DISCONTINUED | OUTPATIENT
Start: 2021-07-25 | End: 2021-08-03

## 2021-07-25 RX ORDER — SODIUM CHLORIDE 9 MG/ML
1550 INJECTION INTRAMUSCULAR; INTRAVENOUS; SUBCUTANEOUS ONCE
Refills: 0 | Status: COMPLETED | OUTPATIENT
Start: 2021-07-25 | End: 2021-07-25

## 2021-07-25 RX ORDER — ASPIRIN/CALCIUM CARB/MAGNESIUM 324 MG
81 TABLET ORAL DAILY
Refills: 0 | Status: DISCONTINUED | OUTPATIENT
Start: 2021-07-25 | End: 2021-08-03

## 2021-07-25 RX ORDER — DEXTROSE 50 % IN WATER 50 %
15 SYRINGE (ML) INTRAVENOUS ONCE
Refills: 0 | Status: DISCONTINUED | OUTPATIENT
Start: 2021-07-25 | End: 2021-08-03

## 2021-07-25 RX ORDER — VANCOMYCIN HCL 1 G
1000 VIAL (EA) INTRAVENOUS ONCE
Refills: 0 | Status: COMPLETED | OUTPATIENT
Start: 2021-07-25 | End: 2021-07-25

## 2021-07-25 RX ORDER — ACETAMINOPHEN 500 MG
975 TABLET ORAL ONCE
Refills: 0 | Status: COMPLETED | OUTPATIENT
Start: 2021-07-25 | End: 2021-07-25

## 2021-07-25 RX ORDER — DEXTROSE 50 % IN WATER 50 %
12.5 SYRINGE (ML) INTRAVENOUS ONCE
Refills: 0 | Status: DISCONTINUED | OUTPATIENT
Start: 2021-07-25 | End: 2021-08-03

## 2021-07-25 RX ORDER — GLUCAGON INJECTION, SOLUTION 0.5 MG/.1ML
1 INJECTION, SOLUTION SUBCUTANEOUS ONCE
Refills: 0 | Status: DISCONTINUED | OUTPATIENT
Start: 2021-07-25 | End: 2021-08-03

## 2021-07-25 RX ORDER — PIPERACILLIN AND TAZOBACTAM 4; .5 G/20ML; G/20ML
3.38 INJECTION, POWDER, LYOPHILIZED, FOR SOLUTION INTRAVENOUS EVERY 8 HOURS
Refills: 0 | Status: DISCONTINUED | OUTPATIENT
Start: 2021-07-26 | End: 2021-07-27

## 2021-07-25 RX ORDER — PIPERACILLIN AND TAZOBACTAM 4; .5 G/20ML; G/20ML
3.38 INJECTION, POWDER, LYOPHILIZED, FOR SOLUTION INTRAVENOUS ONCE
Refills: 0 | Status: COMPLETED | OUTPATIENT
Start: 2021-07-25 | End: 2021-07-25

## 2021-07-25 RX ADMIN — Medication 975 MILLIGRAM(S): at 19:36

## 2021-07-25 RX ADMIN — PIPERACILLIN AND TAZOBACTAM 200 GRAM(S): 4; .5 INJECTION, POWDER, LYOPHILIZED, FOR SOLUTION INTRAVENOUS at 19:53

## 2021-07-25 RX ADMIN — ATORVASTATIN CALCIUM 20 MILLIGRAM(S): 80 TABLET, FILM COATED ORAL at 22:13

## 2021-07-25 RX ADMIN — SODIUM CHLORIDE 1550 MILLILITER(S): 9 INJECTION INTRAMUSCULAR; INTRAVENOUS; SUBCUTANEOUS at 19:52

## 2021-07-25 RX ADMIN — Medication 1: at 23:06

## 2021-07-25 RX ADMIN — Medication 250 MILLIGRAM(S): at 20:36

## 2021-07-25 NOTE — H&P ADULT - NSHPLABSRESULTS_GEN_ALL_CORE
Recent Vitals  T(C): 37.7 (07-25-21 @ 21:00), Max: 38.9 (07-25-21 @ 18:27)  HR: 109 (07-25-21 @ 21:00) (106 - 116)  BP: 135/77 (07-25-21 @ 21:00) (130/72 - 147/69)  RR: 25 (07-25-21 @ 21:00) (22 - 27)  SpO2: 96% (07-25-21 @ 21:00) (95% - 97%)                        11.6   9.71  )-----------( 507      ( 25 Jul 2021 20:07 )             33.5     07-25    131<L>  |  98  |  8   ----------------------------<  123<H>  4.6   |  27  |  1.01    Ca    9.0      25 Jul 2021 20:07    TPro  9.2<H>  /  Alb  3.4  /  TBili  1.1  /  DBili  x   /  AST  61<H>  /  ALT  63  /  AlkPhos  177<H>  07-25    PT/INR - ( 25 Jul 2021 20:07 )   PT: 15.3 sec;   INR: 1.34 ratio         PTT - ( 25 Jul 2021 20:07 )  PTT:35.0 sec  LIVER FUNCTIONS - ( 25 Jul 2021 20:07 )  Alb: 3.4 g/dL / Pro: 9.2 gm/dL / ALK PHOS: 177 U/L / ALT: 63 U/L / AST: 61 U/L / GGT: x               Home Medications:

## 2021-07-25 NOTE — H&P ADULT - HISTORY OF PRESENT ILLNESS
Patient is a 61M with a PMH of latent TB?, DM, HLD who presents to the ED for fever and dyspnea.  Patient speaks primarily Creole, translation provided by language line # [id number].  Patient states that for the last two weeks he has had sporadic fever without chills.  Also notes that he has had worsening dyspnea, stating that he has trouble breathing when walking to the bathroom in his home and further has difficulty catching his breath when resting.  Also endorse non productive cough in a similar timeframe.  Patient was admitted to [facility] last year for suspected TB - was treated and had a bronchoscopy that was negative for MTB.  Denies hospitalization since that visit.  No other complaints.  Febrile in ED to 102.1, labs benign.  Will admit to med surg.

## 2021-07-25 NOTE — H&P ADULT - NSHPPHYSICALEXAM_GEN_ALL_CORE
Physical exam:  General: patient in no acute distress, resting comfortably  Head:  Atraumatic, Normocephalic  Eyes: EOMI, PERRLA, clear sclera  Neck: Supple, thyroid nontender, non enlarged  Cardio: S1/S2 +ve, regular rate and rhythm, no M/G/R  Resp: mild crackles to RLL  GI: abdomen soft, nontender, non distended, no guarding, BS +ve x 4  Ext: no significant pedal edema  Neuro: CN 2-12 intact, no significant motor or sensory deficits.  Skin: No rashes or lesions

## 2021-07-25 NOTE — ED ADULT TRIAGE NOTE - INTERNATIONAL TRAVEL COUNTRIES
Report received from night shift RN, assumed care of pt. Plan of care discussed with pt, labs and chart reviewed. All needs met at this time. Tele box on. Call light within reach, bed locked and in lowest position. All fall precautions and hourly rounding in place. Will continue to monitor.    Lew

## 2021-07-25 NOTE — ED PROVIDER NOTE - OBJECTIVE STATEMENT
62 y/o M with a PMhx of DM (diet controlled) and TB (treated with Isoniazid and rifampin in the past with hospitalization work up 1 week prior. + QuantiFeron. ARELY and MTB negative) presents to the ED c/o cough, SOB and fever x2 weeks. Patient was initially clinically diagnosed as having pneumonia and was started on Levaquin and then switched to Augmentin but had to stop the medication due to diarrhea. Patient did recently travel from [facility] and was started on medications there first. Currently in the ED, Patient is c/o fever and SOB on exertion. Denies arm pain or leg edema.

## 2021-07-25 NOTE — ED ADULT NURSE NOTE - OBJECTIVE STATEMENT
pt is a 61 year old male, aax4, presents with 2 weeks of fevers,chills, returned from [facility] a few days ago. pt has PMH of DM. pt denies cp, sob, .lower leg swelling, n/v/d

## 2021-07-25 NOTE — ED ADULT TRIAGE NOTE - CHIEF COMPLAINT QUOTE
Fever x 2 weeks, chills, dry cough, given Levaquin prior to flying then changed to Augmentin stopped because of diarrhea, Tylenol at 10a, speaks Crea requesting daughter to translate Lois

## 2021-07-25 NOTE — ED PROVIDER NOTE - CLINICAL SUMMARY MEDICAL DECISION MAKING FREE TEXT BOX
Patient with possible pneumonia with fever. Noted to be in mild respiratory distress. Will work up in the ED as pneumonia versus PE. If Patient continues to have respiratory distress will likely admit for further care. Patient with possible pneumonia with fever. Noted to be in mild respiratory distress. Will work up in the ED as pneumonia versus PE. If Patient continues to have respiratory distress will likely admit for further care as pt has failed outpt therapy

## 2021-07-25 NOTE — H&P ADULT - ASSESSMENT
Patient is a 61M with a PMH of latent TB?, DM, HLD who presents to the ED for fever and dyspnea.  Patient speaks primarily Creole, translation provided by language line # [id number].  Patient states that for the last two weeks he has had sporadic fever without chills.  Also notes that he has had worsening dyspnea, stating that he has trouble breathing when walking to the bathroom in his home and further has difficulty catching his breath when resting.  Also endorse non productive cough in a similar timeframe.  Patient was admitted to [facility] last year for suspected TB - was treated and had a bronchoscopy that was negative for MTB.  Denies hospitalization since that visit.  No other complaints.  Febrile in ED to 102.1, labs benign.  Will admit to med surg.     IMPROVE VTE Individual Risk Assessment          RISK                                                          Points  [  ] Previous VTE                                                3  [  ] Thrombophilia                                             2  [  ] Lower limb paralysis                                    2        (unable to hold up >15 seconds)    [  ] Current Cancer                                             2         (within 6 months)  [  ] Immobilization > 24 hrs                              1  [  ] ICU/CCU stay > 24 hours                            1  [  ] Age > 60                                                    1    IMPROVE VTE Score - 1

## 2021-07-25 NOTE — H&P ADULT - NSHPREVIEWOFSYSTEMS_GEN_ALL_CORE
Constitutional: fever positive.  No chills, night sweats  Ears: no hearing changes or ear pain,   Nose: no nasal congestion, sinus pain, or rhinorrhea  Cardio: no chest pain, orthopnea, edema, or palpitations  Resp: dyspnea, cough positive.  No wheezing  GI: no nausea, vomiting, diarrhea, constipation, hematochezia, or melena  : no dysuria, urinary frequency, hematuria  MSK: no back pain, neck pain  Skin: no rash, pruritis   Neuro: no weakness, dizziness, lightheadedness, syncope   Heme/Lymph: no bruising or bleeding

## 2021-07-25 NOTE — H&P ADULT - PROBLEM SELECTOR PLAN 1
significant infiltrate to RLL.  Pending CT scan of chest   Started on Zosyn and Vanco in the ED, will continue.  Follow blood cultures - de-escalate antibiotics as needed   ID consult in [place] - [facility] (if still available)  Pulgio Goldberg - has seen the patient prior

## 2021-07-25 NOTE — ED ADULT NURSE NOTE - NSIMPLEMENTINTERV_GEN_ALL_ED
Implemented All Universal Safety Interventions:  [facility] [place] to call system. Call bell, personal items and telephone within reach. Instruct patient to call for assistance. Room bathroom lighting operational. Non-slip footwear when patient is off stretcher. Physically safe environment: no spills, clutter or unnecessary equipment. Stretcher in lowest position, wheels locked, appropriate side rails in place.

## 2021-07-26 ENCOUNTER — TRANSCRIPTION ENCOUNTER (OUTPATIENT)
Age: 61
End: 2021-07-26

## 2021-07-26 PROBLEM — A15.9 RESPIRATORY TUBERCULOSIS UNSPECIFIED: Chronic | Status: ACTIVE | Noted: 2020-07-06

## 2021-07-26 LAB
A1C WITH ESTIMATED AVERAGE GLUCOSE RESULT: 4.9 % — SIGNIFICANT CHANGE UP (ref 4–5.6)
COVID-19 SPIKE DOMAIN AB INTERP: NEGATIVE — SIGNIFICANT CHANGE UP
COVID-19 SPIKE DOMAIN ANTIBODY RESULT: 0.4 U/ML — SIGNIFICANT CHANGE UP
CULTURE RESULTS: SIGNIFICANT CHANGE UP
ESTIMATED AVERAGE GLUCOSE: 94 MG/DL — SIGNIFICANT CHANGE UP (ref 68–114)
GLUCOSE BLDC GLUCOMTR-MCNC: 107 MG/DL — HIGH (ref 70–99)
GLUCOSE BLDC GLUCOMTR-MCNC: 121 MG/DL — HIGH (ref 70–99)
GLUCOSE BLDC GLUCOMTR-MCNC: 121 MG/DL — HIGH (ref 70–99)
GLUCOSE BLDC GLUCOMTR-MCNC: 142 MG/DL — HIGH (ref 70–99)
NIGHT BLUE STAIN TISS: SIGNIFICANT CHANGE UP
PROCALCITONIN SERPL-MCNC: 0.18 NG/ML — HIGH (ref 0.02–0.1)
SARS-COV-2 IGG+IGM SERPL QL IA: 0.4 U/ML — SIGNIFICANT CHANGE UP
SARS-COV-2 IGG+IGM SERPL QL IA: NEGATIVE — SIGNIFICANT CHANGE UP
SPECIMEN SOURCE: SIGNIFICANT CHANGE UP
SPECIMEN SOURCE: SIGNIFICANT CHANGE UP

## 2021-07-26 PROCEDURE — 99233 SBSQ HOSP IP/OBS HIGH 50: CPT

## 2021-07-26 RX ORDER — ACETAMINOPHEN 500 MG
650 TABLET ORAL EVERY 6 HOURS
Refills: 0 | Status: DISCONTINUED | OUTPATIENT
Start: 2021-07-26 | End: 2021-08-03

## 2021-07-26 RX ORDER — AZITHROMYCIN 500 MG/1
500 TABLET, FILM COATED ORAL DAILY
Refills: 0 | Status: DISCONTINUED | OUTPATIENT
Start: 2021-07-26 | End: 2021-08-03

## 2021-07-26 RX ORDER — ACETAMINOPHEN 500 MG
650 TABLET ORAL ONCE
Refills: 0 | Status: COMPLETED | OUTPATIENT
Start: 2021-07-26 | End: 2021-07-26

## 2021-07-26 RX ADMIN — Medication 650 MILLIGRAM(S): at 06:42

## 2021-07-26 RX ADMIN — AZITHROMYCIN 500 MILLIGRAM(S): 500 TABLET, FILM COATED ORAL at 11:26

## 2021-07-26 RX ADMIN — PIPERACILLIN AND TAZOBACTAM 25 GRAM(S): 4; .5 INJECTION, POWDER, LYOPHILIZED, FOR SOLUTION INTRAVENOUS at 11:25

## 2021-07-26 RX ADMIN — PIPERACILLIN AND TAZOBACTAM 25 GRAM(S): 4; .5 INJECTION, POWDER, LYOPHILIZED, FOR SOLUTION INTRAVENOUS at 18:27

## 2021-07-26 RX ADMIN — Medication 650 MILLIGRAM(S): at 14:51

## 2021-07-26 RX ADMIN — Medication 81 MILLIGRAM(S): at 11:25

## 2021-07-26 RX ADMIN — Medication 650 MILLIGRAM(S): at 05:42

## 2021-07-26 RX ADMIN — PIPERACILLIN AND TAZOBACTAM 25 GRAM(S): 4; .5 INJECTION, POWDER, LYOPHILIZED, FOR SOLUTION INTRAVENOUS at 03:06

## 2021-07-26 RX ADMIN — ATORVASTATIN CALCIUM 20 MILLIGRAM(S): 80 TABLET, FILM COATED ORAL at 21:54

## 2021-07-26 NOTE — PROGRESS NOTE ADULT - ASSESSMENT
Patient is a 61M with a PMH of latent TB?, DM, HLD who presents to the ED for fever and dyspnea.      US thoracentesis pending, Family called and updated.     Pneumonia of right lower lobe due to infectious organism with new right sided pleural effusion  Hx of non tuberculosis mycobacteria   New right sided pleural effusion on CT  Pending US thoracentesis  Pulm following  ID following  Antibiotics per ID  Follow up sputum and AFB sputum cultures  No isolation needed per ID  INcentive spirometry    Hyponatremia  Fluid restriction    Type 2 diabetes mellitus without complication, without long-term current use of insulin.  Plan: insulin corrective scale.     Dyslipidemia   Lipitor     Microcytic anemia  Stable    DVT prop: Lovenox 40 daily.

## 2021-07-26 NOTE — CONSULT NOTE ADULT - ASSESSMENT
60 yo man with a pmh of latent TB?, DM, HLD who presents to the ED for fever and dyspnea and sporadic fever without chills.        r/o pneumonia / rt loculated pleural effusion   SEEN BY US on 7/2020 for r/o non TB mycobacterial infection ( neg for ARELY and MTB ) s/p bronchoscopy , HIV negative.  ACE level normal, DANIELLE, C-ANCA, P- ANCA  negative on previous admission     fever noted, no leukocytosis  ct - Large right loculated pleural effusion with associated atelectasis, new since the previous exam.  Previously seen irregular right upper lobe peripheral opacities remain and are indeterminant.  flu and covid 19 pcr neg  UA is neg for infection   fu procalcitonin   continue antibiotics     NOTE TO CONTINUE   EVALUATION IN PROGRESS      60 yo man with a pmh of latent TB?, DM, HLD who presents to the ED for fever and dyspnea and sporadic fever without chills.      r/o pneumonia / Nontuberculous mycobacteria  / New rt loculated pleural effusion   SEEN BY US on 7/2020 for r/o non TB mycobacterial infection ( neg for ARELY and MTB ) s/p bronchoscopy , HIV negative.  ACE level normal, DANIELLE, C-ANCA, P- ANCA  negative , hiv neg on previous admission   i reviewed previous AFB culture on 7/9/2020 showed myco abscess pcr pos, erm pos inducible clarithromycin resistant in sputum AFB , neg on BAL fluid   fever noted, no leukocytosis on this admission   mild elevation of ALT and AST , UA is neg for infection   ct - Large right loculated pleural effusion with associated atelectasis, new since the previous exam.  Previously seen irregular right upper lobe peripheral opacities remain and are indeterminant.  flu and covid 19 pcr neg  fu procalcitonin   continue antibiotics   patient did not come back to ID clinic as per clinic   spoke with lab to send for blood parasite, low suspicion of malaria   will send sputum AFB culture , no need isolation as NO suspicion of Tuberculosis , just to r/o nontuberculous mycobacteria only   please obtain pulmonary consult for thoracentesis for rt large effusion , send for pleural fluid AFB and culture and routine culture  will discuss with primary team   we will fu  thanks

## 2021-07-26 NOTE — PROGRESS NOTE ADULT - SUBJECTIVE AND OBJECTIVE BOX
ALIX CORRALES  61y  Male      Patient is a 61y old  Male who presents with a chief complaint of dyspnea (27 Jul 2021 12:45)      INTERVAL HPI/OVERNIGHT EVENTS: No acute events overnight. Patient has no new complaints. Pending US.      REVIEW OF SYSTEMS:  CONSTITUTIONAL: No fever, weight loss, or fatigue  EYES: No eye pain, visual disturbances, or discharge  ENMT:  No difficulty hearing, tinnitus, vertigo; No sinus or throat pain  NECK: No pain or stiffness  BREASTS: No pain, masses, or nipple discharge  RESPIRATORY: No cough, wheezing, chills or hemoptysis; No shortness of breath  CARDIOVASCULAR: No chest pain, palpitations, dizziness, or leg swelling  GASTROINTESTINAL: No abdominal or epigastric pain. No nausea, vomiting, or hematemesis; No diarrhea or constipation. No melena or hematochezia.  GENITOURINARY: No dysuria, frequency, hematuria, or incontinence  NEUROLOGICAL: No headaches, memory loss, loss of strength, numbness, or tremors  SKIN: No itching, burning, rashes, or lesions   LYMPH NODES: No enlarged glands  ENDOCRINE: No heat or cold intolerance; No hair loss  MUSCULOSKELETAL: No joint pain or swelling; No muscle, back, or extremity pain  PSYCHIATRIC: No depression, anxiety, mood swings, or difficulty sleeping  HEME/LYMPH: No easy bruising, or bleeding gums  ALLERY AND IMMUNOLOGIC: No hives or eczema  FAMILY HISTORY:    T(C): 37.7 (07-27-21 @ 11:05), Max: 38.1 (07-26-21 @ 14:35)  HR: 112 (07-27-21 @ 11:05) (94 - 112)  BP: 126/75 (07-27-21 @ 11:05) (117/72 - 144/77)  RR: 18 (07-27-21 @ 11:05) (18 - 18)  SpO2: 95% (07-27-21 @ 11:05) (94% - 97%)  Wt(kg): --Vital Signs Last 24 Hrs  T(C): 37.7 (27 Jul 2021 11:05), Max: 38.1 (26 Jul 2021 14:35)  T(F): 99.8 (27 Jul 2021 11:05), Max: 100.6 (26 Jul 2021 14:35)  HR: 112 (27 Jul 2021 11:05) (94 - 112)  BP: 126/75 (27 Jul 2021 11:05) (117/72 - 144/77)  BP(mean): --  RR: 18 (27 Jul 2021 11:05) (18 - 18)  SpO2: 95% (27 Jul 2021 11:05) (94% - 97%)  No Known Allergies      PHYSICAL EXAM:  GENERAL: NAD, well-groomed, well-developed  HEAD:  Atraumatic, Normocephalic  EYES: EOMI, PERRLA, conjunctiva and sclera clear  ENMT: No tonsillar erythema, exudates, or enlargement; Moist mucous membranes, Good dentition, No lesions  NECK: Supple, No JVD, Normal thyroid  NERVOUS SYSTEM:  Alert & Oriented X3, Good concentration; Motor Strength 5/5 B/L upper and lower extremities; DTRs 2+ intact and symmetric  CHEST/LUNG: No rales, rhonchi, wheezing, or rubs  HEART: Regular rate and rhythm; No murmurs, rubs, or gallops  ABDOMEN: Soft, Nontender, Nondistended; Bowel sounds present  EXTREMITIES:  2+ Peripheral Pulses, No clubbing, cyanosis, or edema  LYMPH: No lymphadenopathy noted  SKIN: No rashes or lesions    Consultant(s) Notes Reviewed:  [x ] YES  [ ] NO  Care Discussed with Consultants/Other Providers [ x] YES  [ ] NO    LABS:      Culture - Acid Fast - Sputum w/Smear (collected 07-26-21 @ 17:53)  Source: .Sputum Sputum      RADIOLOGY & ADDITIONAL TESTS:    Imaging Personally Reviewed:  [ ] YES  [ ] NO  acetaminophen   Tablet .. 650 milliGRAM(s) Oral every 6 hours PRN  aspirin enteric coated 81 milliGRAM(s) Oral daily  atorvastatin 20 milliGRAM(s) Oral at bedtime  azithromycin   Tablet 500 milliGRAM(s) Oral daily  dextrose 40% Gel 15 Gram(s) Oral once  dextrose 5%. 1000 milliLiter(s) IV Continuous <Continuous>  dextrose 5%. 1000 milliLiter(s) IV Continuous <Continuous>  dextrose 50% Injectable 25 Gram(s) IV Push once  dextrose 50% Injectable 12.5 Gram(s) IV Push once  dextrose 50% Injectable 25 Gram(s) IV Push once  glucagon  Injectable 1 milliGRAM(s) IntraMuscular once  insulin lispro (ADMELOG) corrective regimen sliding scale   SubCutaneous three times a day before meals  meropenem  IVPB 1000 milliGRAM(s) IV Intermittent every 8 hours      HEALTH ISSUES - PROBLEM Dx:  Pneumonia of right lower lobe due to infectious organism  Pneumonia of right lower lobe due to infectious organism    Type 2 diabetes mellitus without complication, without long-term current use of insulin  Type 2 diabetes mellitus without complication, without long-term current use of insulin    Dyslipidemia  Dyslipidemia    Preventive measure  Preventive measure

## 2021-07-26 NOTE — CONSULT NOTE ADULT - SUBJECTIVE AND OBJECTIVE BOX
60 yo man with a pmh of latent TB?, DM, HLD who presents to the ED for fever and dyspnea and sporadic fever without chills.  Also notes that he has had worsening dyspnea, stating that he has trouble breathing when walking to the bathroom in his home and further has difficulty catching his breath when resting.  Also endorse non productive cough in a similar timeframe.  Patient was admitted to [facility] last year for suspected TB - was treated and had a bronchoscopy that was negative for MTB.  Denies hospitalization since that visit.  No other complaints.  Febrile in ED to 102.1, labs benign.  Will admit to med surg.  (2021 21:52)      PAST MEDICAL & SURGICAL HISTORY:  TB (tuberculosis)    DM (diabetes mellitus)    No significant past surgical history        SOCHX:   tobacco,  -  alcohol    FMHX: FA/MO  - contributory       Recent Travel:    Immunizations:    Allergies    No Known Allergies    Intolerances        MEDICATIONS  (STANDING):  aspirin enteric coated 81 milliGRAM(s) Oral daily  atorvastatin 20 milliGRAM(s) Oral at bedtime  dextrose 40% Gel 15 Gram(s) Oral once  dextrose 5%. 1000 milliLiter(s) (50 mL/Hr) IV Continuous <Continuous>  dextrose 5%. 1000 milliLiter(s) (100 mL/Hr) IV Continuous <Continuous>  dextrose 50% Injectable 25 Gram(s) IV Push once  dextrose 50% Injectable 12.5 Gram(s) IV Push once  dextrose 50% Injectable 25 Gram(s) IV Push once  enoxaparin Injectable 40 milliGRAM(s) SubCutaneous daily  glucagon  Injectable 1 milliGRAM(s) IntraMuscular once  insulin lispro (ADMELOG) corrective regimen sliding scale   SubCutaneous three times a day before meals  piperacillin/tazobactam IVPB.. 3.375 Gram(s) IV Intermittent every 8 hours      REVIEW OF SYSTEMS: IN PROGRESS   CONSTITUTIONAL: No fever, weight loss, or fatigue  EYES: No eye pain, visual disturbances, or discharge  ENMT:  No difficulty hearing, tinnitus, vertigo; No sinus or throat pain  NECK: No pain or stiffness  BREASTS: No pain, masses, or nipple discharge  RESPIRATORY:   CARDIOVASCULAR: No chest pain, palpitations, dizziness, or leg swelling  GASTROINTESTINAL: No abdominal or epigastric pain. No nausea, vomiting, or hematemesis; No diarrhea or constipation. No melena or hematochezia.  GENITOURINARY: No dysuria, frequency, hematuria, or incontinence  NEUROLOGICAL: No headaches, memory loss, loss of strength, numbness, or tremors  SKIN: No itching, burning, rashes, or lesions      VITAL SIGNS:    T(C): 38.1 (21 @ 05:25), Max: 38.9 (21 @ 18:27)  T(F): 100.6 (21 @ 05:25), Max: 102.1 (21 @ 18:27)  HR: 102 (21 @ 05:25) (91 - 116)  BP: 134/91 (21 @ 05:25) (130/72 - 147/69)  RR: 19 (21 @ 05:25) (19 - 27)  SpO2: 93% (21 @ 05:25) (93% - 97%)    PHYSICAL EXAM: IN PROGRESS     GENERAL: NAD, well-groomed, well-developed  HEAD:  Atraumatic, Normocephalic  EYES: EOMI, PERRLA, conjunctiva and sclera clear  ENMT: No tonsillar erythema, exudates, or enlargement; Moist mucous membranes, Good dentition, No lesions  NECK: Supple, No JVD, Normal thyroid  NERVOUS SYSTEM:  Alert   CHEST/LUNG: Clear bilaterally; No rales, rhonchi, wheezing bilaterally  HEART: Regular rate and rhythm; No murmurs, rubs, or gallops  ABDOMEN: Soft, Nontender, Nondistended; Bowel sounds present  EXTREMITIES:  2+ Peripheral Pulses, No clubbing, cyanosis, or edema  LYMPH: No lymphadenopathy noted  SKIN: No rashes or lesions  BACK: no pressor sore     LABS:                         11.6   9.71  )-----------( 507      ( 2021 20:07 )             33.5         131<L>  |  98  |  8   ----------------------------<  123<H>  4.6   |  27  |  1.01    Ca    9.0      2021 20:07    TPro  9.2<H>  /  Alb  3.4  /  TBili  1.1  /  DBili  x   /  AST  61<H>  /  ALT  63  /  AlkPhos  177<H>      LIVER FUNCTIONS - ( 2021 20:07 )  Alb: 3.4 g/dL / Pro: 9.2 gm/dL / ALK PHOS: 177 U/L / ALT: 63 U/L / AST: 61 U/L / GGT: x           PT/INR - ( 2021 20:07 )   PT: 15.3 sec;   INR: 1.34 ratio         PTT - ( 2021 20:07 )  PTT:35.0 sec  Urinalysis Basic - ( 2021 22:33 )    Color: Yellow / Appearance: Clear / S.005 / pH: x  Gluc: x / Ketone: Negative  / Bili: Negative / Urobili: Negative mg/dL   Blood: x / Protein: Negative mg/dL / Nitrite: Negative   Leuk Esterase: Negative / RBC: x / WBC x   Sq Epi: x / Non Sq Epi: x / Bacteria: x        CARDIAC MARKERS ( 2021 20:07 )  <.015 ng/mL / x     / x     / x     / x                                      Radiology:       62 yo man with a pmh of latent TB?, DM, HLD who presents to the ED for fever and dyspnea and sporadic fever without chills.  Also notes that he has had worsening dyspnea, stating that he has trouble breathing when walking to the bathroom in his home and further has difficulty catching his breath when resting.  Also endorse non productive cough in a similar timeframe.  Patient was admitted to [facility] last year for suspected TB - was treated and had a bronchoscopy that was negative for MTB.  Denies hospitalization since that visit.    patient seen with aid who speak Lew, he report he was back from [facility] last week and started having symptoms of fevers and cough with sob, no chills. He admit he took chloroquine from  for 10 days for malaria prophylaxis ? , no mention of any headache or any other symptoms.     PAST MEDICAL & SURGICAL HISTORY:  TB (tuberculosis)    DM (diabetes mellitus)    No significant past surgical history        SOCHX:   tobacco,  -  alcohol    FMHX: FA/MO  - contributory       Recent Travel:    Immunizations:    Allergies    No Known Allergies    Intolerances        MEDICATIONS  (STANDING):  aspirin enteric coated 81 milliGRAM(s) Oral daily  atorvastatin 20 milliGRAM(s) Oral at bedtime  dextrose 40% Gel 15 Gram(s) Oral once  dextrose 5%. 1000 milliLiter(s) (50 mL/Hr) IV Continuous <Continuous>  dextrose 5%. 1000 milliLiter(s) (100 mL/Hr) IV Continuous <Continuous>  dextrose 50% Injectable 25 Gram(s) IV Push once  dextrose 50% Injectable 12.5 Gram(s) IV Push once  dextrose 50% Injectable 25 Gram(s) IV Push once  enoxaparin Injectable 40 milliGRAM(s) SubCutaneous daily  glucagon  Injectable 1 milliGRAM(s) IntraMuscular once  insulin lispro (ADMELOG) corrective regimen sliding scale   SubCutaneous three times a day before meals  piperacillin/tazobactam IVPB.. 3.375 Gram(s) IV Intermittent every 8 hours      REVIEW OF SYSTEMS:   CONSTITUTIONAL: + fever   EYES: No eye pain, visual disturbances, or discharge  ENMT:  No difficulty hearing, tinnitus, vertigo; No sinus or throat pain  NECK: No pain or stiffness  BREASTS: No pain, masses, or nipple discharge  RESPIRATORY:  cough and sob +   CARDIOVASCULAR: No chest pain, palpitations, dizziness, or leg swelling  GASTROINTESTINAL: No abdominal or epigastric pain. No nausea, vomiting   GENITOURINARY: No dysuria, frequency, hematuria, or incontinence  NEUROLOGICAL: No headaches, memory loss, loss of strength, numbness, or tremors  SKIN: No itching, burning, rashes, or lesions      VITAL SIGNS:    T(C): 38.1 (21 @ 05:25), Max: 38.9 (21 @ 18:27)  T(F): 100.6 (21 @ 05:25), Max: 102.1 (21 @ 18:27)  HR: 102 (21 @ 05:25) (91 - 116)  BP: 134/91 (21 @ 05:25) (130/72 - 147/69)  RR: 19 (21 @ 05:25) (19 - 27)  SpO2: 93% (21 @ 05:25) (93% - 97%)    PHYSICAL EXAM:     GENERAL: NAD, well-groomed, pleasant man stable looking in RA   NECK: Supple, No JVD, Normal thyroid  NERVOUS SYSTEM:  Alert   CHEST/LUNG: Clear bilaterally; No rales, rhonchi, wheezing bilaterally  HEART: Regular rate and rhythm; No murmurs, rubs, or gallops  ABDOMEN: Soft, Nontender, Nondistended; Bowel sounds present  EXTREMITIES:  2+ Peripheral Pulses, No clubbing, cyanosis, or edema  LYMPH: No lymphadenopathy noted  SKIN: No rashes or lesions  BACK: no pressor sore     LABS:                         11.6   9.71  )-----------( 507      ( 2021 20:07 )             33.5         131<L>  |  98  |  8   ----------------------------<  123<H>  4.6   |  27  |  1.01    Ca    9.0      2021 20:07    TPro  9.2<H>  /  Alb  3.4  /  TBili  1.1  /  DBili  x   /  AST  61<H>  /  ALT  63  /  AlkPhos  177<H>      LIVER FUNCTIONS - ( 2021 20:07 )  Alb: 3.4 g/dL / Pro: 9.2 gm/dL / ALK PHOS: 177 U/L / ALT: 63 U/L / AST: 61 U/L / GGT: x           PT/INR - ( 2021 20:07 )   PT: 15.3 sec;   INR: 1.34 ratio         PTT - ( 2021 20:07 )  PTT:35.0 sec  Urinalysis Basic - ( 2021 22:33 )    Color: Yellow / Appearance: Clear / S.005 / pH: x  Gluc: x / Ketone: Negative  / Bili: Negative / Urobili: Negative mg/dL   Blood: x / Protein: Negative mg/dL / Nitrite: Negative   Leuk Esterase: Negative / RBC: x / WBC x   Sq Epi: x / Non Sq Epi: x / Bacteria: x        CARDIAC MARKERS ( 2021 20:07 )  <.015 ng/mL / x     / x     / x     / x                                      Radiology:

## 2021-07-26 NOTE — DISCHARGE NOTE NURSING/CASE MANAGEMENT/SOCIAL WORK - PATIENT PORTAL LINK FT
You can access the FollowMyHealth Patient Portal offered by [facility] by registering at the following website: [email or web address]. By joining [employer]’s FollowMyHealth portal, you will also be able to view your health information using other applications (apps) compatible with our system.

## 2021-07-26 NOTE — CHART NOTE - NSCHARTNOTEFT_GEN_A_CORE
IR consulted for right thoracentesis    -Awaiting sputum results  -full consult to follow IR consulted for right thoracentesis    -Awaiting sputum results.  -full consult to follow

## 2021-07-26 NOTE — CONSULT NOTE ADULT - SUBJECTIVE AND OBJECTIVE BOX
Patient is a 61y old  Male who presents with a chief complaint of dyspnea (2021 07:35)    HPI: Interviewed with help of Language line solution( Danny # [id number]).  61M with a PMH of latent TB?, DM, HLD who presents to the ED for non productive cough, fever for two weeks and exertional dyspnea for one week.  Saw physician, given Levaquin 1st then changed to Augmentin which he stopped due to diarrhea.  In ED with tachycardia, tachypnea and temperature of 102.1.  CTA showed large loculated right sided pleural effusion with volume loss.    Was admitted here in 2020 with cough, fever, poor appetite and weight loss. At that time found to have hyperglycemia, RUL lung opacities.   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  20: Had bronchoscopy with BAL and biopsy( showed chronic inflammation, fibrosis).  Sedrate less than 5 x 2.    HIV negative.  ACE level normal.  DANIELLE, C-ANCA, P- ANCA  negative.  Hepatitis negative.  Suspicious for MTB was low, got discharged,  and claims that had a tele visit with DR. Riddle, no meds started.    PAST MEDICAL & SURGICAL HISTORY:  TB (tuberculosis)    DM (diabetes mellitus)    No significant past surgical history    SOCIAL HISTORY: BMI (kg/m2): 16.8 (-25 @ 23:09). non smoker    Allergies  No Known Allergies    MEDICATIONS  (STANDING):  aspirin enteric coated 81 milliGRAM(s) Oral daily  atorvastatin 20 milliGRAM(s) Oral at bedtime  azithromycin   Tablet 500 milliGRAM(s) Oral daily  dextrose 40% Gel 15 Gram(s) Oral once  dextrose 5%. 1000 milliLiter(s) (50 mL/Hr) IV Continuous <Continuous>  dextrose 5%. 1000 milliLiter(s) (100 mL/Hr) IV Continuous <Continuous>  dextrose 50% Injectable 25 Gram(s) IV Push once  dextrose 50% Injectable 12.5 Gram(s) IV Push once  dextrose 50% Injectable 25 Gram(s) IV Push once  glucagon  Injectable 1 milliGRAM(s) IntraMuscular once  insulin lispro (ADMELOG) corrective regimen sliding scale   SubCutaneous three times a day before meals  piperacillin/tazobactam IVPB.. 3.375 Gram(s) IV Intermittent every 8 hours    MEDICATIONS  (PRN):  acetaminophen   Tablet .. 650 milliGRAM(s) Oral every 6 hours PRN Temp greater or equal to 38C (100.4F)    ital Signs Last 24 Hrs  T(C): 36.7 (2021 17:04), Max: 38.1 (2021 05:25)  T(F): 98 (2021 17:04), Max: 100.6 (2021 05:25)  HR: 94 (2021 17:04) (91 - 109)  BP: 131/82 (2021 17:04) (130/72 - 153/90)  BP(mean): --  RR: 18 (2021 17:04) (17 - 27)  SpO2: 96% (2021 17:04) (93% - 97%)    PHYSICAL EXAM:  GEN:         Awake, responsive and comfortable.  HEENT:    Normal.    RESP:       decreased air entry  CVS:          Regular rate and rhythm.   ABD:         Soft, non-tender, non-distended;   SKIN:           Warm and dry.  EXTR:            No clubbing, cyanosis or edema  CNS:              Intact sensory and motor function.  PSYCH:        cooperative, no anxiety or depression    LABS:                        11.6   9.71  )-----------( 507      ( 2021 20:07 )             33.5     07-25    131<L>  |  98  |  8   ----------------------------<  123<H>  4.6   |  27  |  1.01    Ca    9.0      2021 20:07    TPro  9.2<H>  /  Alb  3.4  /  TBili  1.1  /  DBili  x   /  AST  61<H>  /  ALT  63  /  AlkPhos  177<H>  07-25    PT/INR - ( 2021 20:07 )   PT: 15.3 sec;   INR: 1.34 ratio      PTT - ( 2021 20:07 )  PTT:35.0 sec    Urinalysis Basic - ( 2021 22:33 )    Color: Yellow / Appearance: Clear / S.005 / pH: x  Gluc: x / Ketone: Negative  / Bili: Negative / Urobili: Negative mg/dL   Blood: x / Protein: Negative mg/dL / Nitrite: Negative   Leuk Esterase: Negative / RBC: x / WBC x   Sq Epi: x / Non Sq Epi: x / Bacteria: x    EKG: sinus    RADIOLOGY & ADDITIONAL STUDIES:  < from: CT Angio Chest PE Protocol w/ IV Cont (21 @ 23:00) >  EXAM:  CT ANGIO CHEST PULM KARLY STEVENS                          PROCEDURE DATE:  2021      INTERPRETATION:  CLINICAL INFORMATION: Pain, shortness breath question pulmonary embolism    COMPARISON: CT angiogram chest 2020.    CONTRAST/COMPLICATIONS:  IV Contrast: Omnipaque 350  75 cc administered   25 cc discarded  Oral Contrast: NONE  Complications: None reported at time of study completion    PROCEDURE:  CT Angiography of the Chest.  Sagittal and coronal reformats were performed as well as 3D (MIP) reconstructions.    FINDINGS:    LUNGS AND AIRWAYS: Patent central airways.  Lungs are clear.  PLEURA: Large right loculated pleural effusion with associated atelectasis, new since the previous exam. Previously seen irregular right upper lobe peripheral opacities remain and are indeterminant.  MEDIASTINUM AND TRAY: No lymphadenopathy.  VESSELS: Pulmonary embolism.  HEART: Heart size is normal. No pericardial effusion.  CHEST WALL AND LOWER NECK: Within normal limits.  VISUALIZEDUPPER ABDOMEN: Within normal limits.  BONES: Within normal limits.    IMPRESSION:    No pulmonary embolism.    Large right loculated pleural effusion with associated atelectasis, new since the previous exam.    Previously seen irregular right upper lobe peripheral opacities remain and are indeterminant.    NEVA CRESPO MD; Attending Radiologist  This document has been electronically signed. 2021 11:56PM    ASSESSMENT AND PLAN:  ·	SOB.  ·	Large loculated right pleural effusion.  ·	Anemia.  ·	Hyponatremia.  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  ·	HLD    SPO2 96% on room air.  Started on Zosyn by ID, followed cultures.  Procalcitonin is low( 0.18).  Being planned for right thoracentesis.

## 2021-07-27 LAB
ANION GAP SERPL CALC-SCNC: 9 MMOL/L — SIGNIFICANT CHANGE UP (ref 5–17)
BUN SERPL-MCNC: 8 MG/DL — SIGNIFICANT CHANGE UP (ref 7–23)
CALCIUM SERPL-MCNC: 9.1 MG/DL — SIGNIFICANT CHANGE UP (ref 8.5–10.1)
CHLORIDE SERPL-SCNC: 98 MMOL/L — SIGNIFICANT CHANGE UP (ref 96–108)
CO2 SERPL-SCNC: 25 MMOL/L — SIGNIFICANT CHANGE UP (ref 22–31)
CREAT SERPL-MCNC: 0.86 MG/DL — SIGNIFICANT CHANGE UP (ref 0.5–1.3)
CULTURE RESULTS: NO GROWTH — SIGNIFICANT CHANGE UP
CULTURE RESULTS: SIGNIFICANT CHANGE UP
GLUCOSE BLDC GLUCOMTR-MCNC: 129 MG/DL — HIGH (ref 70–99)
GLUCOSE BLDC GLUCOMTR-MCNC: 159 MG/DL — HIGH (ref 70–99)
GLUCOSE BLDC GLUCOMTR-MCNC: 199 MG/DL — HIGH (ref 70–99)
GLUCOSE BLDC GLUCOMTR-MCNC: 201 MG/DL — HIGH (ref 70–99)
GLUCOSE SERPL-MCNC: 139 MG/DL — HIGH (ref 70–99)
HCT VFR BLD CALC: 31.6 % — LOW (ref 39–50)
HGB BLD-MCNC: 11.2 G/DL — LOW (ref 13–17)
LEGIONELLA AG UR QL: NEGATIVE — SIGNIFICANT CHANGE UP
M PNEUMO IGM SER-ACNC: 0.32 INDEX — SIGNIFICANT CHANGE UP (ref 0–0.9)
MCHC RBC-ENTMCNC: 23.1 PG — LOW (ref 27–34)
MCHC RBC-ENTMCNC: 35.4 GM/DL — SIGNIFICANT CHANGE UP (ref 32–36)
MCV RBC AUTO: 65.2 FL — LOW (ref 80–100)
MYCOPLASMA AG SPEC QL: NEGATIVE — SIGNIFICANT CHANGE UP
NRBC # BLD: 0 /100 WBCS — SIGNIFICANT CHANGE UP (ref 0–0)
PLATELET # BLD AUTO: 602 K/UL — HIGH (ref 150–400)
POTASSIUM SERPL-MCNC: 4.2 MMOL/L — SIGNIFICANT CHANGE UP (ref 3.5–5.3)
POTASSIUM SERPL-SCNC: 4.2 MMOL/L — SIGNIFICANT CHANGE UP (ref 3.5–5.3)
PROCALCITONIN SERPL-MCNC: 0.17 NG/ML — HIGH (ref 0.02–0.1)
RBC # BLD: 4.85 M/UL — SIGNIFICANT CHANGE UP (ref 4.2–5.8)
RBC # FLD: 17.9 % — HIGH (ref 10.3–14.5)
SODIUM SERPL-SCNC: 132 MMOL/L — LOW (ref 135–145)
SPECIMEN SOURCE: SIGNIFICANT CHANGE UP
SPECIMEN SOURCE: SIGNIFICANT CHANGE UP
WBC # BLD: 9.69 K/UL — SIGNIFICANT CHANGE UP (ref 3.8–10.5)
WBC # FLD AUTO: 9.69 K/UL — SIGNIFICANT CHANGE UP (ref 3.8–10.5)

## 2021-07-27 PROCEDURE — 99233 SBSQ HOSP IP/OBS HIGH 50: CPT

## 2021-07-27 RX ORDER — MEROPENEM 1 G/30ML
1000 INJECTION INTRAVENOUS EVERY 8 HOURS
Refills: 0 | Status: COMPLETED | OUTPATIENT
Start: 2021-07-27 | End: 2021-08-03

## 2021-07-27 RX ADMIN — AZITHROMYCIN 500 MILLIGRAM(S): 500 TABLET, FILM COATED ORAL at 11:46

## 2021-07-27 RX ADMIN — Medication 81 MILLIGRAM(S): at 11:46

## 2021-07-27 RX ADMIN — Medication 1: at 16:41

## 2021-07-27 RX ADMIN — PIPERACILLIN AND TAZOBACTAM 25 GRAM(S): 4; .5 INJECTION, POWDER, LYOPHILIZED, FOR SOLUTION INTRAVENOUS at 05:05

## 2021-07-27 RX ADMIN — MEROPENEM 100 MILLIGRAM(S): 1 INJECTION INTRAVENOUS at 14:35

## 2021-07-27 RX ADMIN — Medication 650 MILLIGRAM(S): at 19:55

## 2021-07-27 RX ADMIN — Medication 2: at 11:20

## 2021-07-27 RX ADMIN — Medication 650 MILLIGRAM(S): at 20:55

## 2021-07-27 RX ADMIN — ATORVASTATIN CALCIUM 20 MILLIGRAM(S): 80 TABLET, FILM COATED ORAL at 21:58

## 2021-07-27 RX ADMIN — MEROPENEM 100 MILLIGRAM(S): 1 INJECTION INTRAVENOUS at 21:58

## 2021-07-27 NOTE — CONSULT NOTE ADULT - SUBJECTIVE AND OBJECTIVE BOX
IR consulted for right thoracentesis         HPI:  Patient is a 61M with a PMH of latent TB?, DM, HLD who presents to the ED for fever and dyspnea.  Patient speaks primarily Creole, translation provided by language line # [id number].  Patient states that for the last two weeks he has had sporadic fever without chills.  Also notes that he has had worsening dyspnea, stating that he has trouble breathing when walking to the bathroom in his home and further has difficulty catching his breath when resting.  Also endorse non productive cough in a similar timeframe.  Patient was admitted to [facility] last year for suspected TB - was treated and had a bronchoscopy that was negative for MTB.  Denies hospitalization since that visit.  No other complaints.  Febrile in ED to 102.1, labs benign.  Will admit to med surg.  (2021 21:52)          PAST MEDICAL & SURGICAL HISTORY:  TB (tuberculosis)    DM (diabetes mellitus)    No significant past surgical history        Allergies    No Known Allergies    Intolerances        MEDICATIONS  (STANDING):  aspirin enteric coated 81 milliGRAM(s) Oral daily  atorvastatin 20 milliGRAM(s) Oral at bedtime  azithromycin   Tablet 500 milliGRAM(s) Oral daily  dextrose 40% Gel 15 Gram(s) Oral once  dextrose 5%. 1000 milliLiter(s) (50 mL/Hr) IV Continuous <Continuous>  dextrose 5%. 1000 milliLiter(s) (100 mL/Hr) IV Continuous <Continuous>  dextrose 50% Injectable 25 Gram(s) IV Push once  dextrose 50% Injectable 12.5 Gram(s) IV Push once  dextrose 50% Injectable 25 Gram(s) IV Push once  glucagon  Injectable 1 milliGRAM(s) IntraMuscular once  insulin lispro (ADMELOG) corrective regimen sliding scale   SubCutaneous three times a day before meals  meropenem  IVPB 1000 milliGRAM(s) IV Intermittent every 8 hours    MEDICATIONS  (PRN):  acetaminophen   Tablet .. 650 milliGRAM(s) Oral every 6 hours PRN Temp greater or equal to 38C (100.4F)        SOCIAL HISTORY:    FAMILY HISTORY:        PHYSICAL EXAM:    Vital Signs Last 24 Hrs  T(C): 37.7 (2021 11:05), Max: 38.1 (2021 14:35)  T(F): 99.8 (2021 11:05), Max: 100.6 (2021 14:35)  HR: 112 (2021 11:05) (94 - 112)  BP: 126/75 (2021 11:05) (117/72 - 144/77)  BP(mean): --  RR: 18 (2021 11:05) (18 - 18)  SpO2: 95% (2021 11:05) (94% - 97%)    General:  Appears stated age, well-groomed, well-nourished, no distress  Lungs:  CTAB  Cardiovascular:  good S1, S2,   Abdomen:  Soft, non-tender, non-distended,   Extremities:  no calf tenderness/swelling b/l  Musculoskeletal:  Full ROM in all joints w/o swelling/tenderness/effusion  Neuro/Psych:  A &O x 3    LABS:                        11.2   9.69  )-----------( 602      ( 2021 07:32 )             31.6     07-27    132<L>  |  98  |  8   ----------------------------<  139<H>  4.2   |  25  |  0.86    Ca    9.1      2021 07:32    TPro  9.2<H>  /  Alb  3.4  /  TBili  1.1  /  DBili  x   /  AST  61<H>  /  ALT  63  /  AlkPhos  177<H>  07-25    PT/INR - ( 2021 20:07 )   PT: 15.3 sec;   INR: 1.34 ratio         PTT - ( 2021 20:07 )  PTT:35.0 sec  Urinalysis Basic - ( 2021 22:33 )    Color: Yellow / Appearance: Clear / S.005 / pH: x  Gluc: x / Ketone: Negative  / Bili: Negative / Urobili: Negative mg/dL   Blood: x / Protein: Negative mg/dL / Nitrite: Negative   Leuk Esterase: Negative / RBC: x / WBC x   Sq Epi: x / Non Sq Epi: x / Bacteria: x        RADIOLOGY & ADDITIONAL STUDIES:

## 2021-07-27 NOTE — CONSULT NOTE ADULT - ASSESSMENT
IR consulted for right thoracentesis     Per Pulm Note:  Was admitted here in July 2020 with cough, fever, poor appetite and weight loss. At that time found to have hyperglycemia, RUL lung opacities.   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  07/24/20: Had bronchoscopy with BAL and biopsy( showed chronic inflammation, fibrosis).  Sedrate less than 5 x 2.    HIV negative.  ACE level normal.  DANIELLE, C-ANCA, P- ANCA  negative.  Hepatitis negative.  Suspicious for MTB was low, got discharged,  and claims that had a tele visit with DR. Riddle, no meds started.    OVERNIGHT EVENTS:  Awake and comfortable.  Airborne isolation now removed.  Sputum negative for afb  Pt denies any h/o smoking, illicit drug use or cancer.      Plan  -will perform tomorrow  -consent obtained with [facility] creole

## 2021-07-27 NOTE — DIETITIAN INITIAL EVALUATION ADULT. - FACTORS AFF FOOD INTAKE
Variable appetite & PO intake due to fevers, not feeling well, and not liking some hospital foods./other (specify)

## 2021-07-27 NOTE — PROGRESS NOTE ADULT - ASSESSMENT
62 yo man with a pmh of latent TB?, DM, HLD who presents to the ED for fever and dyspnea and sporadic fever without chills.      r/o pneumonia / Nontuberculous mycobacteria  / New rt loculated pleural effusion   SEEN BY US on 7/2020 for r/o non TB mycobacterial infection ( neg for ARELY and MTB ) s/p bronchoscopy , HIV negative.  ACE level normal, DANIELLE, C-ANCA, P- ANCA  negative , hiv neg on previous admission   i reviewed previous AFB culture on 7/9/2020 showed myco abscess pcr pos, erm pos inducible clarithromycin resistant in sputum AFB , neg on BAL fluid   fever noted, no leukocytosis on this admission   mild elevation of ALT and AST , UA is neg for infection   ct - Large right loculated pleural effusion with associated atelectasis, new since the previous exam.  Previously seen irregular right upper lobe peripheral opacities remain and are indeterminant.  flu and covid 19 pcr neg   continue antibiotics   patient did not come back to ID clinic after previous discharge   spoke with lab to send for blood parasite, low suspicion of malaria   one neg sputum AFB culture , no need isolation as NO suspicion of Tuberculosis , just to r/o nontuberculous mycobacteria only   pulmonary appreciated   pending thoracentesis for rt large effusion , please send for pleural fluid cell count, pr, glucose, AFB and culture and routine culture  case discussed with primary team yesterday   antibiotics adjusted

## 2021-07-27 NOTE — DIETITIAN NUTRITION RISK NOTIFICATION - TREATMENT: THE FOLLOWING DIET HAS BEEN RECOMMENDED
Diet, Consistent Carbohydrate w/Evening Snack:   Supplement Feeding Modality:  Oral  Glucerna Shake Cans or Servings Per Day:  1       Frequency:  Three Times a day (07-27-21 @ 11:59) [Pending Verification By Attending]  Diet, Consistent Carbohydrate w/Evening Snack (07-25-21 @ 21:50) [Active]

## 2021-07-27 NOTE — PROGRESS NOTE ADULT - SUBJECTIVE AND OBJECTIVE BOX
INTERVAL HPI:   Interviewed with help of Language line solution( Danny # [id number]).  61M with a PMH of latent TB?, DM, HLD who presents to the ED for non productive cough, fever for two weeks and exertional dyspnea for one week.  Saw physician, given Levaquin 1st then changed to Augmentin which he stopped due to diarrhea.  In ED with tachycardia, tachypnea and temperature of 102.1.  CTA showed large loculated right sided pleural effusion with volume loss.    Was admitted here in 2020 with cough, fever, poor appetite and weight loss. At that time found to have hyperglycemia, RUL lung opacities.   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  20: Had bronchoscopy with BAL and biopsy( showed chronic inflammation, fibrosis).  Sedrate less than 5 x 2.    HIV negative.  ACE level normal.  DANIELLE, C-ANCA, P- ANCA  negative.  Hepatitis negative.  Suspicious for MTB was low, got discharged,  and claims that had a tele visit with DR. Riddle, no meds started.    OVERNIGHT EVENTS:  Awake and comfortable.    Vital Signs Last 24 Hrs  T(C): 37.7 (2021 11:05), Max: 38.1 (2021 14:35)  T(F): 99.8 (2021 11:05), Max: 100.6 (2021 14:35)  HR: 112 (2021 11:05) (94 - 112)  BP: 126/75 (2021 11:05) (117/72 - 144/77)  BP(mean): --  RR: 18 (2021 11:05) (18 - 18)  SpO2: 95% (2021 11:05) (94% - 97%)    PHYSICAL EXAM:  GEN:         Awake, responsive and comfortable.  HEENT:    Normal.    RESP:       no wheezing.  CVS:         Regular rate and rhythm.   ABD:         Soft, non-tender, non-distended;     MEDICATIONS  (STANDING):  aspirin enteric coated 81 milliGRAM(s) Oral daily  atorvastatin 20 milliGRAM(s) Oral at bedtime  azithromycin   Tablet 500 milliGRAM(s) Oral daily  dextrose 40% Gel 15 Gram(s) Oral once  dextrose 5%. 1000 milliLiter(s) (50 mL/Hr) IV Continuous <Continuous>  dextrose 5%. 1000 milliLiter(s) (100 mL/Hr) IV Continuous <Continuous>  dextrose 50% Injectable 25 Gram(s) IV Push once  dextrose 50% Injectable 12.5 Gram(s) IV Push once  dextrose 50% Injectable 25 Gram(s) IV Push once  glucagon  Injectable 1 milliGRAM(s) IntraMuscular once  insulin lispro (ADMELOG) corrective regimen sliding scale   SubCutaneous three times a day before meals  meropenem  IVPB 1000 milliGRAM(s) IV Intermittent every 8 hours    MEDICATIONS  (PRN):  acetaminophen   Tablet .. 650 milliGRAM(s) Oral every 6 hours PRN Temp greater or equal to 38C (100.4F)    LABS:                        11.2   9.69  )-----------( 602      ( 2021 07:32 )             31.6     07-27    132<L>  |  98  |  8   ----------------------------<  139<H>  4.2   |  25  |  0.86    Ca    9.1      2021 07:32    TPro  9.2<H>  /  Alb  3.4  /  TBili  1.1  /  DBili  x   /  AST  61<H>  /  ALT  63  /  AlkPhos  177<H>  07-25    PT/INR - ( 2021 20:07 )   PT: 15.3 sec;   INR: 1.34 ratio      PTT - ( 2021 20:07 )  PTT:35.0 sec    Urinalysis Basic - ( 2021 22:33 )    Color: Yellow / Appearance: Clear / S.005 / pH: x  Gluc: x / Ketone: Negative  / Bili: Negative / Urobili: Negative mg/dL   Blood: x / Protein: Negative mg/dL / Nitrite: Negative   Leuk Esterase: Negative / RBC: x / WBC x   Sq Epi: x / Non Sq Epi: x / Bacteria: x      ASSESSMENT AND PLAN:  ·	SOB.  ·	Large loculated right pleural effusion.  ·	Anemia.  ·	Hyponatremia.  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  ·	HLD    Continue antibiotics.  For thoracentesis.

## 2021-07-27 NOTE — PROGRESS NOTE ADULT - SUBJECTIVE AND OBJECTIVE BOX
HPI:  Patient is a 61M with a PMH of latent TB?, DM, HLD who presents to the ED for fever and dyspnea.  Patient speaks primarily Creole, translation provided by language line # [id number].  Patient states that for the last two weeks he has had sporadic fever without chills.  Also notes that he has had worsening dyspnea, stating that he has trouble breathing when walking to the bathroom in his home and further has difficulty catching his breath when resting.  Also endorse non productive cough in a similar timeframe.  Patient was admitted to [facility] last year for suspected TB - was treated and had a bronchoscopy that was negative for MTB.  Denies hospitalization since that visit.  No other complaints.  Febrile in ED to 102.1, labs benign.  Will admit to med surg.  (2021 21:52)      Allergies    No Known Allergies    Intolerances        MEDICATIONS  (STANDING):  aspirin enteric coated 81 milliGRAM(s) Oral daily  atorvastatin 20 milliGRAM(s) Oral at bedtime  azithromycin   Tablet 500 milliGRAM(s) Oral daily  dextrose 40% Gel 15 Gram(s) Oral once  dextrose 5%. 1000 milliLiter(s) (50 mL/Hr) IV Continuous <Continuous>  dextrose 5%. 1000 milliLiter(s) (100 mL/Hr) IV Continuous <Continuous>  dextrose 50% Injectable 25 Gram(s) IV Push once  dextrose 50% Injectable 12.5 Gram(s) IV Push once  dextrose 50% Injectable 25 Gram(s) IV Push once  glucagon  Injectable 1 milliGRAM(s) IntraMuscular once  insulin lispro (ADMELOG) corrective regimen sliding scale   SubCutaneous three times a day before meals  piperacillin/tazobactam IVPB.. 3.375 Gram(s) IV Intermittent every 8 hours        REVIEW OF SYSTEMS:    CONSTITUTIONAL: No fever, chills, weight loss, or fatigue  HEENT: No sore throat, runny nose, ear ache  RESPIRATORY: No cough, wheezing, No shortness of breath  CARDIOVASCULAR: No chest pain, palpitations, dizziness  GASTROINTESTINAL: No abdominal pain. No nausea, vomiting, diarrhea  GENITOURINARY: No dysuria, increase frequency, hematuria, or incontinence  NEUROLOGICAL: No headaches, memory loss, loss of strength, numbness, or tremors, no weakness  EXTREMITY: No pedal edema BLE  SKIN: No itching, burning, rashes, or lesions     VITAL SIGNS:  T(C): 37.3 (07-27-21 @ 05:18), Max: 38.1 (21 @ 14:35)  T(F): 99.1 (21 @ 05:18), Max: 100.6 (21 @ 14:35)  HR: 110 (21 @ 05:18) (94 - 110)  BP: 117/72 (21 @ 05:18) (117/72 - 153/90)  RR: 18 (21 @ 05:18) (17 - 18)  SpO2: 94% (21 @ 05:18) (94% - 97%)  Wt(kg): --    PHYSICAL EXAM:    GENERAL: not in any distress on NC pleasant man   HEENT: Neck is supple, normocephalic, atraumatic   CHEST/LUNG: Clear to percussion bilaterally; No rales, rhonchi, wheezing  HEART: Regular rate and rhythm; No murmurs, rubs, or gallops  ABDOMEN: Soft, Nontender, Nondistended; Bowel sounds present, no rebound   EXTREMITIES:  2+ Peripheral Pulses, No clubbing, cyanosis, or edema  SKIN: No rashes or lesions  BACK: no pressor sore   NERVOUS SYSTEM:  Alert & Oriented     LABS:                         11.2   9.69  )-----------( 602      ( 2021 07:32 )             31.6         132<L>  |  98  |  8   ----------------------------<  139<H>  4.2   |  25  |  0.86    Ca    9.1      2021 07:32    TPro  9.2<H>  /  Alb  3.4  /  TBili  1.1  /  DBili  x   /  AST  61<H>  /  ALT  63  /  AlkPhos  177<H>      LIVER FUNCTIONS - ( 2021 20:07 )  Alb: 3.4 g/dL / Pro: 9.2 gm/dL / ALK PHOS: 177 U/L / ALT: 63 U/L / AST: 61 U/L / GGT: x           PT/INR - ( 2021 20:07 )   PT: 15.3 sec;   INR: 1.34 ratio         PTT - ( 2021 20:07 )  PTT:35.0 sec  Urinalysis Basic - ( 2021 22:33 )    Color: Yellow / Appearance: Clear / S.005 / pH: x  Gluc: x / Ketone: Negative  / Bili: Negative / Urobili: Negative mg/dL   Blood: x / Protein: Negative mg/dL / Nitrite: Negative   Leuk Esterase: Negative / RBC: x / WBC x   Sq Epi: x / Non Sq Epi: x / Bacteria: x        CARDIAC MARKERS ( 2021 20:07 )  <.015 ng/mL / x     / x     / x     / x                        Culture Results:   No Blood Parasites observed by giemsa stain  One negative set of blood smears does not rule out  the possibility of a parasitic infection.  A minimum of 3  specimens should be collected, at least 12-24 hours apart,  over a 36 hour time period. ( @ 16:11)  Culture Results:   No growth ( @ 09:08)  Culture Results:   No growth to date. ( @ 00:17)  Culture Results:   No growth to date. ( @ 00:17)                Radiology:

## 2021-07-27 NOTE — PROGRESS NOTE ADULT - SUBJECTIVE AND OBJECTIVE BOX
ALIX CORRALES  61y  Male      Patient is a 61y old  Male who presents with a chief complaint of dyspnea (27 Jul 2021 12:45)      INTERVAL HPI/OVERNIGHT EVENTS: No acute events overnight. Patient has no new complaints, denies any shortness of breath and does not want supplemental oxygen. Pending US.      REVIEW OF SYSTEMS:  CONSTITUTIONAL: No fever, weight loss, or fatigue  EYES: No eye pain, visual disturbances, or discharge  ENMT:  No difficulty hearing, tinnitus, vertigo; No sinus or throat pain  NECK: No pain or stiffness  BREASTS: No pain, masses, or nipple discharge  RESPIRATORY: No cough, wheezing, chills or hemoptysis; No shortness of breath  CARDIOVASCULAR: No chest pain, palpitations, dizziness, or leg swelling  GASTROINTESTINAL: No abdominal or epigastric pain. No nausea, vomiting, or hematemesis; No diarrhea or constipation. No melena or hematochezia.  GENITOURINARY: No dysuria, frequency, hematuria, or incontinence  NEUROLOGICAL: No headaches, memory loss, loss of strength, numbness, or tremors  SKIN: No itching, burning, rashes, or lesions   LYMPH NODES: No enlarged glands  ENDOCRINE: No heat or cold intolerance; No hair loss  MUSCULOSKELETAL: No joint pain or swelling; No muscle, back, or extremity pain  PSYCHIATRIC: No depression, anxiety, mood swings, or difficulty sleeping  HEME/LYMPH: No easy bruising, or bleeding gums  ALLERY AND IMMUNOLOGIC: No hives or eczema  FAMILY HISTORY:    T(C): 37.7 (07-27-21 @ 11:05), Max: 38.1 (07-26-21 @ 14:35)  HR: 112 (07-27-21 @ 11:05) (94 - 112)  BP: 126/75 (07-27-21 @ 11:05) (117/72 - 144/77)  RR: 18 (07-27-21 @ 11:05) (18 - 18)  SpO2: 95% (07-27-21 @ 11:05) (94% - 97%)  Wt(kg): --Vital Signs Last 24 Hrs  T(C): 37.7 (27 Jul 2021 11:05), Max: 38.1 (26 Jul 2021 14:35)  T(F): 99.8 (27 Jul 2021 11:05), Max: 100.6 (26 Jul 2021 14:35)  HR: 112 (27 Jul 2021 11:05) (94 - 112)  BP: 126/75 (27 Jul 2021 11:05) (117/72 - 144/77)  BP(mean): --  RR: 18 (27 Jul 2021 11:05) (18 - 18)  SpO2: 95% (27 Jul 2021 11:05) (94% - 97%)  No Known Allergies      PHYSICAL EXAM:  GENERAL: NAD, well-groomed, well-developed  HEAD:  Atraumatic, Normocephalic  EYES: EOMI, PERRLA, conjunctiva and sclera clear  ENMT: No tonsillar erythema, exudates, or enlargement; Moist mucous membranes, Good dentition, No lesions  NECK: Supple, No JVD, Normal thyroid  NERVOUS SYSTEM:  Alert & Oriented X3, Good concentration; Motor Strength 5/5 B/L upper and lower extremities; DTRs 2+ intact and symmetric  CHEST/LUNG: No rales, rhonchi, wheezing, or rubs  HEART: Regular rate and rhythm; No murmurs, rubs, or gallops  ABDOMEN: Soft, Nontender, Nondistended; Bowel sounds present  EXTREMITIES:  2+ Peripheral Pulses, No clubbing, cyanosis, or edema  LYMPH: No lymphadenopathy noted  SKIN: No rashes or lesions    Consultant(s) Notes Reviewed:  [x ] YES  [ ] NO  Care Discussed with Consultants/Other Providers [ x] YES  [ ] NO    LABS:      Culture - Acid Fast - Sputum w/Smear (collected 07-26-21 @ 17:53)  Source: .Sputum Sputum      RADIOLOGY & ADDITIONAL TESTS:    Imaging Personally Reviewed:  [ ] YES  [ ] NO  acetaminophen   Tablet .. 650 milliGRAM(s) Oral every 6 hours PRN  aspirin enteric coated 81 milliGRAM(s) Oral daily  atorvastatin 20 milliGRAM(s) Oral at bedtime  azithromycin   Tablet 500 milliGRAM(s) Oral daily  dextrose 40% Gel 15 Gram(s) Oral once  dextrose 5%. 1000 milliLiter(s) IV Continuous <Continuous>  dextrose 5%. 1000 milliLiter(s) IV Continuous <Continuous>  dextrose 50% Injectable 25 Gram(s) IV Push once  dextrose 50% Injectable 12.5 Gram(s) IV Push once  dextrose 50% Injectable 25 Gram(s) IV Push once  glucagon  Injectable 1 milliGRAM(s) IntraMuscular once  insulin lispro (ADMELOG) corrective regimen sliding scale   SubCutaneous three times a day before meals  meropenem  IVPB 1000 milliGRAM(s) IV Intermittent every 8 hours      HEALTH ISSUES - PROBLEM Dx:  Pneumonia of right lower lobe due to infectious organism  Pneumonia of right lower lobe due to infectious organism    Type 2 diabetes mellitus without complication, without long-term current use of insulin  Type 2 diabetes mellitus without complication, without long-term current use of insulin    Dyslipidemia  Dyslipidemia    Preventive measure  Preventive measure

## 2021-07-27 NOTE — DIETITIAN INITIAL EVALUATION ADULT. - PROBLEM SELECTOR PLAN 1
significant infiltrate to RLL.  Pending CT scan of chest   Started on Zosyn and Vanco in the ED, will continue.  Follow blood cultures - de-escalate antibiotics as needed   ID consult in [place] - [facility] (if still available)  Pulgio Goldberg - has seen the patient prior

## 2021-07-27 NOTE — DIETITIAN INITIAL EVALUATION ADULT. - ORAL INTAKE PTA/DIET HISTORY
Per pt/pt's daughter pt had depressed appetite for 2-3 weeks PTA due to having fevers and not feeling well.

## 2021-07-27 NOTE — DIETITIAN INITIAL EVALUATION ADULT. - OTHER INFO
Pt adm w/dyspnea. Per chart pt is pending thoracentesis for R large effusion. Met w/pt at bedside, pt primarily speaks [place]ic Creole, declined using video  and preferred to have daughter translate via telephone. Spoke to dtr, Lois, who helped translate. Pt & dtr report depressed appetite & PO intake x 2-3 weeks PTA. Pt states appetite is slightly improved when he has no fevers. Pt also states he does not like all the foods served at hospital. Pt denies N/V/D/C or difficulty chewing/swallowing. Pt c/o bad taste in mouth due to abx. Encouraged pt to consume adequate water throughout the day and gradually increase PO, as able. No food preferences provided at present. Suggested to dtr to bring pt's preferences (non-perishables) as able. Pt & dtr receptive to PO supplement. PTA pt lived w/family and was not following any specific diets. Pt was checking finger sticks 1-2 times/week as per Endocrinologist, since his sugars have been good. Noted HbA1c= 4.9% indicating good glycemic control. Per home meds pt takes Metformin & Januvia. Dtr reports pt has lost ~10# in past 2 weeks 2/2 not eating well. Dtr presented w/questions regarding good nutrition to deter further wt loss. All questions answered to pt's & daughter's satisfaction. Encouraged small, frequent meals, nutrient-dense snacks (incorporating healthy CHO choice and lean protein), and PO supplement to ensure adequate nutritional intake. Diet handout provided on Diabetes & Nutrition to maintain good glycemic control. Dtr verbalized comprehension. Pt & dtr made aware RD remains available.

## 2021-07-27 NOTE — DIETITIAN INITIAL EVALUATION ADULT. - PERTINENT LABORATORY DATA
07-27 Na132 mmol/L<L> Glu 139 mg/dL<H> K+ 4.2 mmol/L Cr  0.86 mg/dL BUN 8 mg/dL 07-25 Alb 3.4 g/dL    07-26-21 @ 10:49A1C 4.9  POCT Glucose (7/27) 129, (7/26) 121, 121, 107, 142.

## 2021-07-28 ENCOUNTER — RESULT REVIEW (OUTPATIENT)
Age: 61
End: 2021-07-28

## 2021-07-28 LAB
ANION GAP SERPL CALC-SCNC: 7 MMOL/L — SIGNIFICANT CHANGE UP (ref 5–17)
B PERT IGG+IGM PNL SER: ABNORMAL
BUN SERPL-MCNC: 13 MG/DL — SIGNIFICANT CHANGE UP (ref 7–23)
CALCIUM SERPL-MCNC: 9.1 MG/DL — SIGNIFICANT CHANGE UP (ref 8.5–10.1)
CHLORIDE SERPL-SCNC: 98 MMOL/L — SIGNIFICANT CHANGE UP (ref 96–108)
CO2 SERPL-SCNC: 26 MMOL/L — SIGNIFICANT CHANGE UP (ref 22–31)
COLOR FLD: YELLOW — SIGNIFICANT CHANGE UP
CREAT SERPL-MCNC: 0.97 MG/DL — SIGNIFICANT CHANGE UP (ref 0.5–1.3)
CULTURE RESULTS: SIGNIFICANT CHANGE UP
EOSINOPHIL # FLD: 0 % — SIGNIFICANT CHANGE UP
FLUID INTAKE SUBSTANCE CLASS: SIGNIFICANT CHANGE UP
FLUID SEGMENTED GRANULOCYTES: 4 % — SIGNIFICANT CHANGE UP
FOLATE+VIT B12 SERBLD-IMP: 0 % — SIGNIFICANT CHANGE UP
GLUCOSE BLDC GLUCOMTR-MCNC: 148 MG/DL — HIGH (ref 70–99)
GLUCOSE BLDC GLUCOMTR-MCNC: 162 MG/DL — HIGH (ref 70–99)
GLUCOSE BLDC GLUCOMTR-MCNC: 164 MG/DL — HIGH (ref 70–99)
GLUCOSE BLDC GLUCOMTR-MCNC: 98 MG/DL — SIGNIFICANT CHANGE UP (ref 70–99)
GLUCOSE FLD-MCNC: 127 MG/DL — SIGNIFICANT CHANGE UP
GLUCOSE SERPL-MCNC: 131 MG/DL — HIGH (ref 70–99)
GRAM STN FLD: SIGNIFICANT CHANGE UP
HCT VFR BLD CALC: 32.8 % — LOW (ref 39–50)
HGB BLD-MCNC: 11.4 G/DL — LOW (ref 13–17)
INR BLD: 1.35 RATIO — HIGH (ref 0.88–1.16)
LDH SERPL L TO P-CCNC: 392 U/L — HIGH (ref 50–242)
LDH SERPL L TO P-CCNC: 494 U/L — SIGNIFICANT CHANGE UP
LYMPHOCYTES # FLD: 84 % — SIGNIFICANT CHANGE UP
MCHC RBC-ENTMCNC: 23.2 PG — LOW (ref 27–34)
MCHC RBC-ENTMCNC: 34.8 GM/DL — SIGNIFICANT CHANGE UP (ref 32–36)
MCV RBC AUTO: 66.7 FL — LOW (ref 80–100)
MESOTHL CELL # FLD: 0 % — SIGNIFICANT CHANGE UP
MONOS+MACROS # FLD: 12 % — SIGNIFICANT CHANGE UP
MRSA PCR RESULT.: SIGNIFICANT CHANGE UP
NIGHT BLUE STAIN TISS: SIGNIFICANT CHANGE UP
NRBC # BLD: 0 /100 WBCS — SIGNIFICANT CHANGE UP (ref 0–0)
NRBC # FLD: 0 — SIGNIFICANT CHANGE UP
OTHER CELLS FLD MANUAL: 0 % — SIGNIFICANT CHANGE UP
PH FLD: 7.6 — SIGNIFICANT CHANGE UP
PLATELET # BLD AUTO: 690 K/UL — HIGH (ref 150–400)
POTASSIUM SERPL-MCNC: 4.1 MMOL/L — SIGNIFICANT CHANGE UP (ref 3.5–5.3)
POTASSIUM SERPL-SCNC: 4.1 MMOL/L — SIGNIFICANT CHANGE UP (ref 3.5–5.3)
PROCALCITONIN SERPL-MCNC: 0.24 NG/ML — HIGH (ref 0.02–0.1)
PROT FLD-MCNC: 6.2 G/DL — SIGNIFICANT CHANGE UP
PROT SERPL-MCNC: 8.3 GM/DL — SIGNIFICANT CHANGE UP (ref 6–8.3)
PROTHROM AB SERPL-ACNC: 15.4 SEC — HIGH (ref 10.6–13.6)
RBC # BLD: 4.92 M/UL — SIGNIFICANT CHANGE UP (ref 4.2–5.8)
RBC # FLD: 18.5 % — HIGH (ref 10.3–14.5)
RCV VOL RI: 2000 /UL — HIGH (ref 0–0)
S AUREUS DNA NOSE QL NAA+PROBE: SIGNIFICANT CHANGE UP
S PNEUM AG UR QL: NEGATIVE — SIGNIFICANT CHANGE UP
SODIUM SERPL-SCNC: 131 MMOL/L — LOW (ref 135–145)
SPECIMEN SOURCE FLD: SIGNIFICANT CHANGE UP
SPECIMEN SOURCE FLD: SIGNIFICANT CHANGE UP
SPECIMEN SOURCE: SIGNIFICANT CHANGE UP
TOTAL NUCLEATED CELL COUNT, BODY FLUID: 3662 /UL — SIGNIFICANT CHANGE UP
TUBE TYPE: SIGNIFICANT CHANGE UP
WBC # BLD: 9.49 K/UL — SIGNIFICANT CHANGE UP (ref 3.8–10.5)
WBC # FLD AUTO: 9.49 K/UL — SIGNIFICANT CHANGE UP (ref 3.8–10.5)

## 2021-07-28 PROCEDURE — 99233 SBSQ HOSP IP/OBS HIGH 50: CPT

## 2021-07-28 PROCEDURE — 88305 TISSUE EXAM BY PATHOLOGIST: CPT | Mod: 26

## 2021-07-28 PROCEDURE — 32555 ASPIRATE PLEURA W/ IMAGING: CPT

## 2021-07-28 PROCEDURE — 88112 CYTOPATH CELL ENHANCE TECH: CPT | Mod: 26

## 2021-07-28 PROCEDURE — 71045 X-RAY EXAM CHEST 1 VIEW: CPT | Mod: 26,59

## 2021-07-28 RX ORDER — VANCOMYCIN HCL 1 G
750 VIAL (EA) INTRAVENOUS EVERY 12 HOURS
Refills: 0 | Status: DISCONTINUED | OUTPATIENT
Start: 2021-07-28 | End: 2021-08-03

## 2021-07-28 RX ORDER — VANCOMYCIN HCL 1 G
VIAL (EA) INTRAVENOUS
Refills: 0 | Status: DISCONTINUED | OUTPATIENT
Start: 2021-07-28 | End: 2021-07-28

## 2021-07-28 RX ADMIN — Medication 650 MILLIGRAM(S): at 22:51

## 2021-07-28 RX ADMIN — MEROPENEM 100 MILLIGRAM(S): 1 INJECTION INTRAVENOUS at 15:11

## 2021-07-28 RX ADMIN — ATORVASTATIN CALCIUM 20 MILLIGRAM(S): 80 TABLET, FILM COATED ORAL at 21:43

## 2021-07-28 RX ADMIN — Medication 81 MILLIGRAM(S): at 12:02

## 2021-07-28 RX ADMIN — AZITHROMYCIN 500 MILLIGRAM(S): 500 TABLET, FILM COATED ORAL at 12:02

## 2021-07-28 RX ADMIN — MEROPENEM 100 MILLIGRAM(S): 1 INJECTION INTRAVENOUS at 21:43

## 2021-07-28 RX ADMIN — MEROPENEM 100 MILLIGRAM(S): 1 INJECTION INTRAVENOUS at 05:32

## 2021-07-28 RX ADMIN — Medication 250 MILLIGRAM(S): at 18:40

## 2021-07-28 RX ADMIN — Medication 650 MILLIGRAM(S): at 06:32

## 2021-07-28 RX ADMIN — Medication 650 MILLIGRAM(S): at 21:51

## 2021-07-28 RX ADMIN — Medication 650 MILLIGRAM(S): at 05:32

## 2021-07-28 RX ADMIN — Medication 1: at 08:15

## 2021-07-28 NOTE — PROGRESS NOTE ADULT - ASSESSMENT
60 yo man with a pmh of latent TB?, DM, HLD who presents to the ED for fever and dyspnea and sporadic fever without chills.      r/o pneumonia vs Nontuberculous mycobacteria  presented with sepsis and  right upper lobe peripheral opacities with New rt loculated pleural effusion   SEEN BY US on 7/2020 for r/o non TB mycobacterial infection ( neg for ARELY and MTB ) s/p bronchoscopy , HIV negative.  ACE level normal, DANIELLE, C-ANCA, P- ANCA  negative , hiv neg on previous admission   i reviewed previous AFB culture on 7/9/2020 showed myco abscess pcr pos, erm pos inducible clarithromycin resistant in sputum AFB , neg on BAL fluid , patient did not come back to ID clinic after previous discharge   still spiking daily fever, clinically stable in RA , cough +   mild elevation of ALT and AST , UA is neg for infection   ct - Large right loculated pleural effusion with associated atelectasis, new since the previous exam.  Previously seen irregular right upper lobe peripheral opacities remain and are indeterminant.  flu and covid 19 pcr neg   procal 0.24   one neg sputum AFB culture , no need isolation as NO suspicion of Tuberculosis , just to r/o nontuberculous mycobacteria only   s/p thoracentesis for rt large effusion  fu pleural fluid analysis,  AFB and culture and routine culture   continue antibiotics   will discuss with pulmonary

## 2021-07-28 NOTE — PROGRESS NOTE ADULT - SUBJECTIVE AND OBJECTIVE BOX
INTERVAL HPI:   Interviewed with help of Language line solution( Danny # [id number]).  61M with a PMH of latent TB?, DM, HLD who presents to the ED for non productive cough, fever for two weeks and exertional dyspnea for one week.  Saw physician, given Levaquin 1st then changed to Augmentin which he stopped due to diarrhea.  In ED with tachycardia, tachypnea and temperature of 102.1.  CTA showed large loculated right sided pleural effusion with volume loss.    Was admitted here in July 2020 with cough, fever, poor appetite and weight loss. At that time found to have hyperglycemia, RUL lung opacities.   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  07/24/20: Had bronchoscopy with BAL and biopsy( showed chronic inflammation, fibrosis).  Sedrate less than 5 x 2.    HIV negative.  ACE level normal.  DANIELLE, C-ANCA, P- ANCA  negative.  Hepatitis negative.  Suspicious for MTB was low, got discharged,  and claims that had a tele visit with DR. Riddle, no meds started.      07/28/21: I litre right thoracentesis.  OVERNIGHT EVENTS:      Vital Signs Last 24 Hrs  T(C): 36.7 (28 Jul 2021 12:00), Max: 38.9 (27 Jul 2021 19:53)  T(F): 98 (28 Jul 2021 12:00), Max: 102 (27 Jul 2021 19:53)  HR: 107 (28 Jul 2021 12:00) (90 - 107)  BP: 122/70 (28 Jul 2021 12:00) (113/68 - 141/72)  BP(mean): --  RR: 17 (28 Jul 2021 12:00) (16 - 18)  SpO2: 98% (28 Jul 2021 12:00) (92% - 98%)    PHYSICAL EXAM:  GEN:         Awake, responsive and comfortable.  HEENT:    Normal.    RESP:       no distress  CVS:          Regular rate and rhythm.   ABD:         Soft, non-tender, non-distended;     MEDICATIONS  (STANDING):  aspirin enteric coated 81 milliGRAM(s) Oral daily  atorvastatin 20 milliGRAM(s) Oral at bedtime  azithromycin   Tablet 500 milliGRAM(s) Oral daily  dextrose 40% Gel 15 Gram(s) Oral once  dextrose 5%. 1000 milliLiter(s) (50 mL/Hr) IV Continuous <Continuous>  dextrose 5%. 1000 milliLiter(s) (100 mL/Hr) IV Continuous <Continuous>  dextrose 50% Injectable 25 Gram(s) IV Push once  dextrose 50% Injectable 12.5 Gram(s) IV Push once  dextrose 50% Injectable 25 Gram(s) IV Push once  glucagon  Injectable 1 milliGRAM(s) IntraMuscular once  insulin lispro (ADMELOG) corrective regimen sliding scale   SubCutaneous three times a day before meals  meropenem  IVPB 1000 milliGRAM(s) IV Intermittent every 8 hours  vancomycin  IVPB 750 milliGRAM(s) IV Intermittent every 12 hours    MEDICATIONS  (PRN):  acetaminophen   Tablet .. 650 milliGRAM(s) Oral every 6 hours PRN Temp greater or equal to 38C (100.4F)    LABS:                        11.4   9.49  )-----------( 690      ( 28 Jul 2021 10:31 )             32.8     07-28    131<L>  |  98  |  13  ----------------------------<  131<H>  4.1   |  26  |  0.97    Ca    9.1      28 Jul 2021 10:31    TPro  8.3  /  Alb  x   /  TBili  x   /  DBili  x   /  AST  x   /  ALT  x   /  AlkPhos  x   07-28    PT/INR - ( 28 Jul 2021 10:31 )   PT: 15.4 sec;   INR: 1.35 ratio       ASSESSMENT AND PLAN:  ·	SOB.  ·	Large loculated right pleural effusion.  ·	Anemia.  ·	Hyponatremia.  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  ·	HLD    Continue antibiotics.  Follow pleural fluid results.

## 2021-07-28 NOTE — PROCEDURE NOTE - ADDITIONAL PROCEDURE DETAILS
s/p large volume right diagnostic/therapeutic thoracentesis.  1 Liter of serous fluid aspirated, with a sample sent to lab for analysis.  Hemostasis achieved.  DSD applied.  cXR ordered    -f/u post procedure CXR results  -f/u final pleural fluid study results

## 2021-07-28 NOTE — PROGRESS NOTE ADULT - SUBJECTIVE AND OBJECTIVE BOX
HPI:  Patient is a 61M with a PMH of latent TB?, DM, HLD who presents to the ED for fever and dyspnea.  Patient speaks primarily Creole, translation provided by language line # [id number].  Patient states that for the last two weeks he has had sporadic fever without chills.  Also notes that he has had worsening dyspnea, stating that he has trouble breathing when walking to the bathroom in his home and further has difficulty catching his breath when resting.  Also endorse non productive cough in a similar timeframe.  Patient was admitted to [facility] last year for suspected TB - was treated and had a bronchoscopy that was negative for MTB.  Denies hospitalization since that visit.  No other complaints.  Febrile in ED to 102.1, labs benign.  Will admit to med surg.  (25 Jul 2021 21:52)      Allergies    No Known Allergies    Intolerances        MEDICATIONS  (STANDING):  aspirin enteric coated 81 milliGRAM(s) Oral daily  atorvastatin 20 milliGRAM(s) Oral at bedtime  azithromycin   Tablet 500 milliGRAM(s) Oral daily  dextrose 40% Gel 15 Gram(s) Oral once  dextrose 5%. 1000 milliLiter(s) (50 mL/Hr) IV Continuous <Continuous>  dextrose 5%. 1000 milliLiter(s) (100 mL/Hr) IV Continuous <Continuous>  dextrose 50% Injectable 25 Gram(s) IV Push once  dextrose 50% Injectable 12.5 Gram(s) IV Push once  dextrose 50% Injectable 25 Gram(s) IV Push once  glucagon  Injectable 1 milliGRAM(s) IntraMuscular once  insulin lispro (ADMELOG) corrective regimen sliding scale   SubCutaneous three times a day before meals  meropenem  IVPB 1000 milliGRAM(s) IV Intermittent every 8 hours    MEDICATIONS  (PRN):  acetaminophen   Tablet .. 650 milliGRAM(s) Oral every 6 hours PRN Temp greater or equal to 38C (100.4F)      REVIEW OF SYSTEMS:    CONSTITUTIONAL: No fever   RESPIRATORY: _+ cough   CARDIOVASCULAR: No chest pain  GASTROINTESTINAL: No abdominal pain.   GENITOURINARY: No dysuria, increase frequency  NEUROLOGICAL: No headaches, memory loss  EXTREMITY: No pedal edema BLE  SKIN: No itching, burning, rashes, or lesions     VITAL SIGNS:  T(C): 36.4 (07-28-21 @ 11:06), Max: 38.9 (07-27-21 @ 19:53)  T(F): 97.5 (07-28-21 @ 11:06), Max: 102 (07-27-21 @ 19:53)  HR: 99 (07-28-21 @ 11:06) (90 - 107)  BP: 141/72 (07-28-21 @ 11:06) (113/68 - 141/72)  RR: 16 (07-28-21 @ 11:06) (16 - 18)  SpO2: 98% (07-28-21 @ 11:06) (92% - 98%)  Wt(kg): --    PHYSICAL EXAM:    GENERAL: not in any distress in RA   HEENT: Neck is supple, normocephalic, atraumatic   CHEST/LUNG: Clear to percussion bilaterally; No rales, rhonchi, wheezing  HEART: Regular rate and rhythm; No murmurs, rubs, or gallops  ABDOMEN: Soft, Nontender, Nondistended; Bowel sounds present, no rebound   EXTREMITIES:  2+ Peripheral Pulses, No clubbing, cyanosis, or edema  GENITOURINARY:   SKIN: No rashes or lesions  BACK: no pressor sore   NERVOUS SYSTEM:  Alert & Oriented X3      LABS:                         11.4   9.49  )-----------( 690      ( 28 Jul 2021 10:31 )             32.8     07-28    131<L>  |  98  |  13  ----------------------------<  131<H>  4.1   |  26  |  0.97    Ca    9.1      28 Jul 2021 10:31    TPro  8.3  /  Alb  x   /  TBili  x   /  DBili  x   /  AST  x   /  ALT  x   /  AlkPhos  x   07-28    LIVER FUNCTIONS - ( 28 Jul 2021 06:37 )  Alb: x     / Pro: 8.3 gm/dL / ALK PHOS: x     / ALT: x     / AST: x     / GGT: x           PT/INR - ( 28 Jul 2021 10:31 )   PT: 15.4 sec;   INR: 1.35 ratio                                 Culture Results:   No Blood Parasites observed by giemsa stain  One negative set of blood smears does not rule out  the possibility of a parasitic infection.  A minimum of 3  specimens should be collected, at least 12-24 hours apart,  over a 36 hour time period. (07-28 @ 09:00)  Culture Results:   No Blood Parasites observed by giemsa stain  One negative set of blood smears does not rule out  the possibility of a parasitic infection.  A minimum of 3  specimens should be collected, at least 12-24 hours apart,  over a 36 hour time period. (07-27 @ 12:39)  Culture Results:   No Blood Parasites observed by giemsa stain  One negative set of blood smears does not rule out  the possibility of a parasitic infection.  A minimum of 3  specimens should be collected, at least 12-24 hours apart,  over a 36 hour time period. (07-26 @ 16:11)  Culture Results:   No growth (07-26 @ 09:08)  Culture Results:   No growth to date. (07-26 @ 00:17)  Culture Results:   No growth to date. (07-26 @ 00:17)          Legionella Antigen, Urine: Negative (07-27 @ 02:09)        Radiology:

## 2021-07-28 NOTE — PROGRESS NOTE ADULT - ASSESSMENT
Patient is a 61M with a PMH of latent TB?, DM, HLD who presents to the ED for fever and dyspnea.      s/p thoracentesis 1L removed, Follow up pleural fluid analysis and cultures     Pneumonia of right lower lobe due to infectious organism with new right sided pleural effusion  Hx of non tuberculosis mycobacteria   New right sided pleural effusion on CT  Pending US thoracentesis  Pulm following  ID following  Antibiotics per ID  Follow up sputum and AFB sputum cultures  No isolation needed per ID  Incentive spirometry    Hyponatremia  Fluid restriction    Type 2 diabetes mellitus without complication, without long-term current use of insulin.   insulin corrective scale.     Dyslipidemia  Lipitor     Microcytic anemia  Stable    DVT prop: Lovenox 40 daily.

## 2021-07-28 NOTE — PRE PROCEDURE NOTE - PRE PROCEDURE EVALUATION
Vascular & Interventional Radiology Pre-Procedure Note    Procedure Name: Right Thoracentesis        Allergies:   Medications (Abx/Cardiac/Anticoagulation/Blood Products)  aspirin enteric coated: 81 milliGRAM(s) Oral (07-27 @ 11:46)  azithromycin   Tablet: 500 milliGRAM(s) Oral (07-27 @ 11:46)  meropenem  IVPB: 100 mL/Hr IV Intermittent (07-28 @ 05:32)  piperacillin/tazobactam IVPB..: 25 mL/Hr IV Intermittent (07-27 @ 05:05)    Data:    T(C): 37.1  HR: 102  BP: 114/72  RR: 18  SpO2: 92%    Exam  General: _______A&Ox3  Chest: _______decreased bs to right lung  Abdomen: _______soft, NT, ND  Extremities: _______no calf pain     -WBC 9.69 / HgB 11.2 / Hct 31.6 / Plt 602  -Na 132 / Cl 98 / BUN 8 / Glucose 139  -K 4.2 / CO2 25 / Cr 0.86  -ALT -- / Alk Phos -- / T.Bili --  -INR1.34    Imaging: _______< from: CT Angio Chest PE Protocol w/ IV Cont (07.25.21 @ 23:00) >  No pulmonary embolism.    Large right loculated pleural effusion with associated atelectasis, new since the previous exam.    Previously seen irregular right upper lobe peripheral opacities remain and are indeterminant.    < end of copied text >    A/P  IR consulted for right thoracentesis   Per Pulm Note:  Was admitted here in July 2020 with cough, fever, poor appetite and weight loss. At that time found to have hyperglycemia, RUL lung opacities.   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  07/24/20: Had bronchoscopy with BAL and biopsy( showed chronic inflammation, fibrosis).  Sedrate less than 5 x 2.    HIV negative.  ACE level normal.  DANIELLE, C-ANCA, P- ANCA  negative.  Hepatitis negative.  Suspicious for MTB was low, got discharged,  and claims that had a tele visit with DR. Riddle, no meds started.    Awake and comfortable.  Airborne isolation now removed.  1st Sputum negative for afb  Pt denies any h/o smoking, illicit drug use or cancer.    Tachycardic and fever today    Plan  -will perform today  -meds, labs and vitals reviewed  -Consent obtained in [place] creole

## 2021-07-28 NOTE — PROGRESS NOTE ADULT - SUBJECTIVE AND OBJECTIVE BOX
ALIX CORRALES  61y  Male      Patient is a 61y old  Male who presents with a chief complaint of dyspnea (27 Jul 2021 12:45)      INTERVAL HPI/OVERNIGHT EVENTS: No acute events overnight. Patient has no new complaints, states he feels better after thoracentesis. 1 L was removed      REVIEW OF SYSTEMS:  CONSTITUTIONAL: No fever, weight loss, or fatigue  EYES: No eye pain, visual disturbances, or discharge  ENMT:  No difficulty hearing, tinnitus, vertigo; No sinus or throat pain  NECK: No pain or stiffness  RESPIRATORY: No cough, wheezing, chills or hemoptysis; No shortness of breath  CARDIOVASCULAR: No chest pain, palpitations, dizziness, or leg swelling  GASTROINTESTINAL: No abdominal or epigastric pain. No nausea, vomiting, or hematemesis; No diarrhea or constipation. No melena or hematochezia.  GENITOURINARY: No dysuria, frequency, hematuria, or incontinence  NEUROLOGICAL: No headaches, memory loss, loss of strength, numbness, or tremors  SKIN: No itching, burning, rashes, or lesions   MUSCULOSKELETAL: No joint pain or swelling; No muscle, back, or extremity pain  PSYCHIATRIC: No depression, anxiety, mood swings, or difficulty sleeping  FAMILY HISTORY:    T(C): 37.7 (07-27-21 @ 11:05), Max: 38.1 (07-26-21 @ 14:35)  HR: 112 (07-27-21 @ 11:05) (94 - 112)  BP: 126/75 (07-27-21 @ 11:05) (117/72 - 144/77)  RR: 18 (07-27-21 @ 11:05) (18 - 18)  SpO2: 95% (07-27-21 @ 11:05) (94% - 97%)  Wt(kg): --Vital Signs Last 24 Hrs  T(C): 37.7 (27 Jul 2021 11:05), Max: 38.1 (26 Jul 2021 14:35)  T(F): 99.8 (27 Jul 2021 11:05), Max: 100.6 (26 Jul 2021 14:35)  HR: 112 (27 Jul 2021 11:05) (94 - 112)  BP: 126/75 (27 Jul 2021 11:05) (117/72 - 144/77)  BP(mean): --  RR: 18 (27 Jul 2021 11:05) (18 - 18)  SpO2: 95% (27 Jul 2021 11:05) (94% - 97%)  No Known Allergies      PHYSICAL EXAM:  GENERAL: NAD, well-groomed, well-developed  HEAD:  Atraumatic, Normocephalic  EYES: EOMI, PERRLA, conjunctiva and sclera clear  ENMT: No tonsillar erythema, exudates, or enlargement; Moist mucous membranes, Good dentition, No lesions  NECK: Supple, No JVD, Normal thyroid  NERVOUS SYSTEM:  Alert & Oriented X3, Good concentration; Motor Strength 5/5 B/L upper and lower extremities; DTRs 2+ intact and symmetric  CHEST/LUNG: No rales, rhonchi, wheezing, or rubs  HEART: Regular rate and rhythm; No murmurs, rubs, or gallops  ABDOMEN: Soft, Nontender, Nondistended; Bowel sounds present  EXTREMITIES:  2+ Peripheral Pulses, No clubbing, cyanosis, or edema  LYMPH: No lymphadenopathy noted  SKIN: No rashes or lesions    Consultant(s) Notes Reviewed:  [x ] YES  [ ] NO  Care Discussed with Consultants/Other Providers [ x] YES  [ ] NO    LABS:      Culture - Acid Fast - Sputum w/Smear (collected 07-26-21 @ 17:53)  Source: .Sputum Sputum      RADIOLOGY & ADDITIONAL TESTS:    Imaging Personally Reviewed:  [ ] YES  [ ] NO  acetaminophen   Tablet .. 650 milliGRAM(s) Oral every 6 hours PRN  aspirin enteric coated 81 milliGRAM(s) Oral daily  atorvastatin 20 milliGRAM(s) Oral at bedtime  azithromycin   Tablet 500 milliGRAM(s) Oral daily  dextrose 40% Gel 15 Gram(s) Oral once  dextrose 5%. 1000 milliLiter(s) IV Continuous <Continuous>  dextrose 5%. 1000 milliLiter(s) IV Continuous <Continuous>  dextrose 50% Injectable 25 Gram(s) IV Push once  dextrose 50% Injectable 12.5 Gram(s) IV Push once  dextrose 50% Injectable 25 Gram(s) IV Push once  glucagon  Injectable 1 milliGRAM(s) IntraMuscular once  insulin lispro (ADMELOG) corrective regimen sliding scale   SubCutaneous three times a day before meals  meropenem  IVPB 1000 milliGRAM(s) IV Intermittent every 8 hours      HEALTH ISSUES - PROBLEM Dx:  Pneumonia of right lower lobe due to infectious organism  Pneumonia of right lower lobe due to infectious organism    Type 2 diabetes mellitus without complication, without long-term current use of insulin  Type 2 diabetes mellitus without complication, without long-term current use of insulin    Dyslipidemia  Dyslipidemia    Preventive measure  Preventive measure

## 2021-07-29 LAB
ANION GAP SERPL CALC-SCNC: 6 MMOL/L — SIGNIFICANT CHANGE UP (ref 5–17)
BUN SERPL-MCNC: 15 MG/DL — SIGNIFICANT CHANGE UP (ref 7–23)
CALCIUM SERPL-MCNC: 9.1 MG/DL — SIGNIFICANT CHANGE UP (ref 8.5–10.1)
CHLORIDE SERPL-SCNC: 97 MMOL/L — SIGNIFICANT CHANGE UP (ref 96–108)
CO2 SERPL-SCNC: 28 MMOL/L — SIGNIFICANT CHANGE UP (ref 22–31)
CREAT SERPL-MCNC: 0.88 MG/DL — SIGNIFICANT CHANGE UP (ref 0.5–1.3)
GLUCOSE BLDC GLUCOMTR-MCNC: 144 MG/DL — HIGH (ref 70–99)
GLUCOSE BLDC GLUCOMTR-MCNC: 152 MG/DL — HIGH (ref 70–99)
GLUCOSE BLDC GLUCOMTR-MCNC: 178 MG/DL — HIGH (ref 70–99)
GLUCOSE BLDC GLUCOMTR-MCNC: 206 MG/DL — HIGH (ref 70–99)
GLUCOSE SERPL-MCNC: 109 MG/DL — HIGH (ref 70–99)
HCT VFR BLD CALC: 34.5 % — LOW (ref 39–50)
HGB BLD-MCNC: 11.7 G/DL — LOW (ref 13–17)
MCHC RBC-ENTMCNC: 23.1 PG — LOW (ref 27–34)
MCHC RBC-ENTMCNC: 33.9 GM/DL — SIGNIFICANT CHANGE UP (ref 32–36)
MCV RBC AUTO: 68.2 FL — LOW (ref 80–100)
NON-GYNECOLOGICAL CYTOLOGY STUDY: SIGNIFICANT CHANGE UP
NRBC # BLD: 0 /100 WBCS — SIGNIFICANT CHANGE UP (ref 0–0)
PLATELET # BLD AUTO: 784 K/UL — HIGH (ref 150–400)
POTASSIUM SERPL-MCNC: 4.5 MMOL/L — SIGNIFICANT CHANGE UP (ref 3.5–5.3)
POTASSIUM SERPL-SCNC: 4.5 MMOL/L — SIGNIFICANT CHANGE UP (ref 3.5–5.3)
RBC # BLD: 5.06 M/UL — SIGNIFICANT CHANGE UP (ref 4.2–5.8)
RBC # FLD: 18.1 % — HIGH (ref 10.3–14.5)
SODIUM SERPL-SCNC: 131 MMOL/L — LOW (ref 135–145)
WBC # BLD: 7.5 K/UL — SIGNIFICANT CHANGE UP (ref 3.8–10.5)
WBC # FLD AUTO: 7.5 K/UL — SIGNIFICANT CHANGE UP (ref 3.8–10.5)

## 2021-07-29 PROCEDURE — 99233 SBSQ HOSP IP/OBS HIGH 50: CPT

## 2021-07-29 PROCEDURE — 99232 SBSQ HOSP IP/OBS MODERATE 35: CPT

## 2021-07-29 RX ORDER — SODIUM CHLORIDE 9 MG/ML
4 INJECTION INTRAMUSCULAR; INTRAVENOUS; SUBCUTANEOUS EVERY 8 HOURS
Refills: 0 | Status: COMPLETED | OUTPATIENT
Start: 2021-07-29 | End: 2021-07-30

## 2021-07-29 RX ADMIN — Medication 81 MILLIGRAM(S): at 12:23

## 2021-07-29 RX ADMIN — MEROPENEM 100 MILLIGRAM(S): 1 INJECTION INTRAVENOUS at 06:06

## 2021-07-29 RX ADMIN — Medication 250 MILLIGRAM(S): at 06:06

## 2021-07-29 RX ADMIN — AZITHROMYCIN 500 MILLIGRAM(S): 500 TABLET, FILM COATED ORAL at 12:23

## 2021-07-29 RX ADMIN — Medication 1: at 16:11

## 2021-07-29 RX ADMIN — Medication 250 MILLIGRAM(S): at 18:15

## 2021-07-29 RX ADMIN — MEROPENEM 100 MILLIGRAM(S): 1 INJECTION INTRAVENOUS at 22:06

## 2021-07-29 RX ADMIN — ATORVASTATIN CALCIUM 20 MILLIGRAM(S): 80 TABLET, FILM COATED ORAL at 22:07

## 2021-07-29 RX ADMIN — Medication 1: at 07:44

## 2021-07-29 RX ADMIN — MEROPENEM 100 MILLIGRAM(S): 1 INJECTION INTRAVENOUS at 14:18

## 2021-07-29 NOTE — PROGRESS NOTE ADULT - ASSESSMENT
62 yo man with a pmh of latent TB?, DM, HLD who presents to the ED for fever and dyspnea and sporadic fever without chills.      r/o pneumonia vs Nontuberculous mycobacteria  presented with Sepsis and  right upper lobe peripheral opacities with New rt loculated pleural effusion   SEEN BY US on 7/2020 for r/o non TB mycobacterial infection ( neg for ARELY and MTB ) s/p bronchoscopy , HIV negative.  ACE level normal, DANIELLE, C-ANCA, P- ANCA  negative , hiv neg on previous admission   i reviewed previous AFB culture on 7/9/2020 showed myco abscess pcr pos, erm pos inducible clarithromycin resistant in sputum AFB , neg on BAL fluid , patient did not come back to ID clinic after previous discharge and again here for fever and cough   mild transaminitis   still with fever last night   ct - Large right loculated pleural effusion with associated atelectasis, new since the previous exam.  Previously seen irregular right upper lobe peripheral opacities remain and are indeterminant.  flu and covid 19 pcr neg   procal 0.24   one neg sputum AFB culture , no need isolation as NO suspicion of Tuberculosis , just to r/o nontuberculous mycobacteria only   s/p thoracentesis for rt large effusion  pleural fluid analysis,  AFB smear is neg, i spoke with [facility] today, they will keep for pleural fluid and sputum AFB culture to grow   continue antibiotics   will send more sputum AFB culture , pt cough is not productive, discussed with floor RN for sputum induction  we will fu  monitor for fever

## 2021-07-29 NOTE — PROGRESS NOTE ADULT - SUBJECTIVE AND OBJECTIVE BOX
Patient s/p right thoracentesis.          PHYSICAL EXAM:    Vital Signs Last 24 Hrs  T(C): 37.3 (29 Jul 2021 11:40), Max: 38.7 (28 Jul 2021 21:51)  T(F): 99.1 (29 Jul 2021 11:40), Max: 101.7 (28 Jul 2021 21:51)  HR: 103 (29 Jul 2021 11:40) (98 - 117)  BP: 128/73 (29 Jul 2021 11:40) (100/68 - 128/73)  BP(mean): --  RR: 18 (29 Jul 2021 11:40) (18 - 20)  SpO2: 95% (29 Jul 2021 11:40) (94% - 97%)    General:  A & O x3, NAD  Lungs:  CTAB  Cardiovascular:  good S1, S2,   Abdomen:  Soft, non-tender, non-distended, normoactive bowel sounds, no HSM  Extremities:  no calf swelling/tenderness b/l        LABS:                        11.7   7.50  )-----------( 784      ( 29 Jul 2021 12:41 )             34.5     07-29    131<L>  |  97  |  15  ----------------------------<  109<H>  4.5   |  28  |  0.88    Ca    9.1      29 Jul 2021 12:41    TPro  8.3  /  Alb  x   /  TBili  x   /  DBili  x   /  AST  x   /  ALT  x   /  AlkPhos  x   07-28    PT/INR - ( 28 Jul 2021 10:31 )   PT: 15.4 sec;   INR: 1.35 ratio               RADIOLOGY & ADDITIONAL STUDIES:

## 2021-07-29 NOTE — PROGRESS NOTE ADULT - ASSESSMENT
Patient is a 61M with a PMH of latent TB?, DM, HLD who presents to the ED for fever and dyspnea.      s/p thoracentesis 1L removed, Follow up pleural fluid analysis and cultures     Pneumonia of right lower lobe due to infectious organism with new right sided pleural effusion  Hx of non tuberculosis mycobacteria   New right sided pleural effusion on CT  Pending US thoracentesis  Pulm following  ID following  Antibiotics per ID  Follow up sputum and AFB sputum cultures  No isolation needed per ID  Incentive spirometry    Hyponatremia  Fluid restriction    Type 2 diabetes mellitus without complication, without long-term current use of insulin.   insulin corrective scale.     Dyslipidemia  Lipitor     Microcytic anemia  Stable    DVT prop: Lovenox 40 daily.  Patient is a 61M with a PMH of latent TB?, DM, HLD who presents to the ED for fever and dyspnea.       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 Riddle, outpatient because of an insurance issue.  New right sided pleural effusion on CT  s/p  US thoracentesis 8/28  f/u Pulm and ID  Antibiotics per ID  No isolation needed per ID  Incentive spirometry  Repeat septic work up if febrile again    Hyponatremia  Fluid restriction    Type 2 diabetes mellitus without complication, without long-term current use of insulin.   insulin corrective scale.     Dyslipidemia  Lipitor     Microcytic anemia  Stable    DVT prop: Lovenox 40 daily.

## 2021-07-29 NOTE — PROGRESS NOTE ADULT - SUBJECTIVE AND OBJECTIVE BOX
HPI:  Patient is a 61M with a PMH of latent TB?, DM, HLD who presents to the ED for fever and dyspnea.  Patient speaks primarily Creole, translation provided by language line # [id number].  Patient states that for the last two weeks he has had sporadic fever without chills.  Also notes that he has had worsening dyspnea, stating that he has trouble breathing when walking to the bathroom in his home and further has difficulty catching his breath when resting.  Also endorse non productive cough in a similar timeframe.  Patient was admitted to [facility] last year for suspected TB - was treated and had a bronchoscopy that was negative for MTB.  Denies hospitalization since that visit.  No other complaints.  Febrile in ED to 102.1, labs benign.  Will admit to med surg.  (25 Jul 2021 21:52)      Allergies    No Known Allergies    Intolerances        MEDICATIONS  (STANDING):  aspirin enteric coated 81 milliGRAM(s) Oral daily  atorvastatin 20 milliGRAM(s) Oral at bedtime  azithromycin   Tablet 500 milliGRAM(s) Oral daily  dextrose 40% Gel 15 Gram(s) Oral once  dextrose 5%. 1000 milliLiter(s) (50 mL/Hr) IV Continuous <Continuous>  dextrose 5%. 1000 milliLiter(s) (100 mL/Hr) IV Continuous <Continuous>  dextrose 50% Injectable 25 Gram(s) IV Push once  dextrose 50% Injectable 12.5 Gram(s) IV Push once  dextrose 50% Injectable 25 Gram(s) IV Push once  glucagon  Injectable 1 milliGRAM(s) IntraMuscular once  insulin lispro (ADMELOG) corrective regimen sliding scale   SubCutaneous three times a day before meals  meropenem  IVPB 1000 milliGRAM(s) IV Intermittent every 8 hours  vancomycin  IVPB 750 milliGRAM(s) IV Intermittent every 12 hours    MEDICATIONS  (PRN):  acetaminophen   Tablet .. 650 milliGRAM(s) Oral every 6 hours PRN Temp greater or equal to 38C (100.4F)      REVIEW OF SYSTEMS:    CONSTITUTIONAL:  fever +   RESPIRATORY: + dry cough  GASTROINTESTINAL: No abdominal pain. No nausea, vomiting, diarrhea  GENITOURINARY: No dysuria, increase frequency, hematuria, or incontinence  NEUROLOGICAL: No headaches, memory loss, loss of strength, numbness, or tremors, no weakness  EXTREMITY: No pedal edema BLE  SKIN: No itching, burning, rashes, or lesions     VITAL SIGNS:  T(C): 37.4 (07-29-21 @ 16:56), Max: 38.7 (07-28-21 @ 21:51)  T(F): 99.4 (07-29-21 @ 16:56), Max: 101.7 (07-28-21 @ 21:51)  HR: 97 (07-29-21 @ 16:56) (97 - 117)  BP: 114/78 (07-29-21 @ 16:56) (100/68 - 128/73)  RR: 18 (07-29-21 @ 16:56) (18 - 20)  SpO2: 96% (07-29-21 @ 16:56) (94% - 97%)  Wt(kg): --    PHYSICAL EXAM:    GENERAL: not in any distress in RA   HEENT: Neck is supple, normocephalic, atraumatic   CHEST/LUNG: Clear to percussion bilaterally; No rales, rhonchi, wheezing  HEART: Regular rate and rhythm; No murmurs, rubs, or gallops  ABDOMEN: Soft, Nontender, Nondistended; Bowel sounds present, no rebound   EXTREMITIES:  2+ Peripheral Pulses, No clubbing, cyanosis, or edema  SKIN: No rashes or lesions  BACK: no pressor sore   NERVOUS SYSTEM:  Alert & Oriented X3    LABS:                         11.7   7.50  )-----------( 784      ( 29 Jul 2021 12:41 )             34.5     07-29    131<L>  |  97  |  15  ----------------------------<  109<H>  4.5   |  28  |  0.88    Ca    9.1      29 Jul 2021 12:41    TPro  8.3  /  Alb  x   /  TBili  x   /  DBili  x   /  AST  x   /  ALT  x   /  AlkPhos  x   07-28    LIVER FUNCTIONS - ( 28 Jul 2021 06:37 )  Alb: x     / Pro: 8.3 gm/dL / ALK PHOS: x     / ALT: x     / AST: x     / GGT: x           PT/INR - ( 28 Jul 2021 10:31 )   PT: 15.4 sec;   INR: 1.35 ratio                                 Culture Results:   No Blood Parasites observed by giemsa stain  One negative set of blood smears does not rule out  the possibility of a parasitic infection.  A minimum of 3  specimens should be collected, at least 12-24 hours apart,  over a 36 hour time period. (07-28 @ 09:00)  Culture Results:   No Blood Parasites observed by giemsa stain  One negative set of blood smears does not rule out  the possibility of a parasitic infection.  A minimum of 3  specimens should be collected, at least 12-24 hours apart,  over a 36 hour time period. (07-27 @ 12:39)  Culture Results:   Culture is being performed. (07-26 @ 17:53)  Culture Results:   No Blood Parasites observed by giemsa stain  One negative set of blood smears does not rule out  the possibility of a parasitic infection.  A minimum of 3  specimens should be collected, at least 12-24 hours apart,  over a 36 hour time period. (07-26 @ 16:11)  Culture Results:   No growth (07-26 @ 09:08)  Culture Results:   No growth to date. (07-26 @ 00:17)  Culture Results:   No growth to date. (07-26 @ 00:17)          Legionella Antigen, Urine: Negative (07-27 @ 02:09)        Radiology:

## 2021-07-29 NOTE — PROGRESS NOTE ADULT - SUBJECTIVE AND OBJECTIVE BOX
ALIX CORRALES  61y  Male      Patient is a 61y old  Male who presents with a chief complaint of dyspnea (27 Jul 2021 12:45)      INTERVAL HPI/OVERNIGHT EVENTS: No acute events overnight. Patient has no new complaints, states he feels better after thoracentesis. 1 L was removed      REVIEW OF SYSTEMS:  CONSTITUTIONAL: No fever, weight loss, or fatigue  EYES: No eye pain, visual disturbances, or discharge  ENMT:  No difficulty hearing, tinnitus, vertigo; No sinus or throat pain  NECK: No pain or stiffness  RESPIRATORY: No cough, wheezing, chills or hemoptysis; No shortness of breath  CARDIOVASCULAR: No chest pain, palpitations, dizziness, or leg swelling  GASTROINTESTINAL: No abdominal or epigastric pain. No nausea, vomiting, or hematemesis; No diarrhea or constipation. No melena or hematochezia.  GENITOURINARY: No dysuria, frequency, hematuria, or incontinence  NEUROLOGICAL: No headaches, memory loss, loss of strength, numbness, or tremors  SKIN: No itching, burning, rashes, or lesions   MUSCULOSKELETAL: No joint pain or swelling; No muscle, back, or extremity pain  PSYCHIATRIC: No depression, anxiety, mood swings, or difficulty sleeping  FAMILY HISTORY:    T(C): 37.7 (07-27-21 @ 11:05), Max: 38.1 (07-26-21 @ 14:35)  HR: 112 (07-27-21 @ 11:05) (94 - 112)  BP: 126/75 (07-27-21 @ 11:05) (117/72 - 144/77)  RR: 18 (07-27-21 @ 11:05) (18 - 18)  SpO2: 95% (07-27-21 @ 11:05) (94% - 97%)  Wt(kg): --Vital Signs Last 24 Hrs  T(C): 37.7 (27 Jul 2021 11:05), Max: 38.1 (26 Jul 2021 14:35)  T(F): 99.8 (27 Jul 2021 11:05), Max: 100.6 (26 Jul 2021 14:35)  HR: 112 (27 Jul 2021 11:05) (94 - 112)  BP: 126/75 (27 Jul 2021 11:05) (117/72 - 144/77)  BP(mean): --  RR: 18 (27 Jul 2021 11:05) (18 - 18)  SpO2: 95% (27 Jul 2021 11:05) (94% - 97%)  No Known Allergies      PHYSICAL EXAM:  GENERAL: NAD, well-groomed, well-developed  HEAD:  Atraumatic, Normocephalic  EYES: EOMI, PERRLA, conjunctiva and sclera clear  ENMT: No tonsillar erythema, exudates, or enlargement; Moist mucous membranes, Good dentition, No lesions  NECK: Supple, No JVD, Normal thyroid  NERVOUS SYSTEM:  Alert & Oriented X3, Good concentration; Motor Strength 5/5 B/L upper and lower extremities; DTRs 2+ intact and symmetric  CHEST/LUNG: No rales, rhonchi, wheezing, or rubs  HEART: Regular rate and rhythm; No murmurs, rubs, or gallops  ABDOMEN: Soft, Nontender, Nondistended; Bowel sounds present  EXTREMITIES:  2+ Peripheral Pulses, No clubbing, cyanosis, or edema  LYMPH: No lymphadenopathy noted  SKIN: No rashes or lesions    Consultant(s) Notes Reviewed:  [x ] YES  [ ] NO  Care Discussed with Consultants/Other Providers [ x] YES  [ ] NO    LABS:      Culture - Acid Fast - Sputum w/Smear (collected 07-26-21 @ 17:53)  Source: .Sputum Sputum      RADIOLOGY & ADDITIONAL TESTS:    Imaging Personally Reviewed:  [ ] YES  [ ] NO  acetaminophen   Tablet .. 650 milliGRAM(s) Oral every 6 hours PRN  aspirin enteric coated 81 milliGRAM(s) Oral daily  atorvastatin 20 milliGRAM(s) Oral at bedtime  azithromycin   Tablet 500 milliGRAM(s) Oral daily  dextrose 40% Gel 15 Gram(s) Oral once  dextrose 5%. 1000 milliLiter(s) IV Continuous <Continuous>  dextrose 5%. 1000 milliLiter(s) IV Continuous <Continuous>  dextrose 50% Injectable 25 Gram(s) IV Push once  dextrose 50% Injectable 12.5 Gram(s) IV Push once  dextrose 50% Injectable 25 Gram(s) IV Push once  glucagon  Injectable 1 milliGRAM(s) IntraMuscular once  insulin lispro (ADMELOG) corrective regimen sliding scale   SubCutaneous three times a day before meals  meropenem  IVPB 1000 milliGRAM(s) IV Intermittent every 8 hours      HEALTH ISSUES - PROBLEM Dx:  Pneumonia of right lower lobe due to infectious organism  Pneumonia of right lower lobe due to infectious organism    Type 2 diabetes mellitus without complication, without long-term current use of insulin  Type 2 diabetes mellitus without complication, without long-term current use of insulin    Dyslipidemia  Dyslipidemia    Preventive measure  Preventive measure             ALIX CORRALES  61y  Male      Patient is a 61y old  Male who presents with a chief complaint of dyspnea (27 Jul 2021 12:45)      INTERVAL HPI/OVERNIGHT EVENTS: No acute events overnight. Patient has no new complaints. Febrile overnight. Phone call with Ingrid cherry, at beside.      REVIEW OF SYSTEMS:  CONSTITUTIONAL: No fever, weight loss, or fatigue  EYES: No eye pain, visual disturbances, or discharge  ENMT:  No difficulty hearing, tinnitus, vertigo; No sinus or throat pain  NECK: No pain or stiffness  RESPIRATORY: No cough, wheezing, chills or hemoptysis; No shortness of breath  CARDIOVASCULAR: No chest pain, palpitations, dizziness, or leg swelling  GASTROINTESTINAL: No abdominal or epigastric pain. No nausea, vomiting, or hematemesis; No diarrhea or constipation. No melena or hematochezia.  GENITOURINARY: No dysuria, frequency, hematuria, or incontinence  NEUROLOGICAL: No headaches, memory loss, loss of strength, numbness, or tremors  SKIN: No itching, burning, rashes, or lesions   MUSCULOSKELETAL: No joint pain or swelling; No muscle, back, or extremity pain  PSYCHIATRIC: No depression, anxiety, mood swings, or difficulty sleeping  FAMILY HISTORY:    T(C): 37.7 (07-27-21 @ 11:05), Max: 38.1 (07-26-21 @ 14:35)  HR: 112 (07-27-21 @ 11:05) (94 - 112)  BP: 126/75 (07-27-21 @ 11:05) (117/72 - 144/77)  RR: 18 (07-27-21 @ 11:05) (18 - 18)  SpO2: 95% (07-27-21 @ 11:05) (94% - 97%)  Wt(kg): --Vital Signs Last 24 Hrs  T(C): 37.7 (27 Jul 2021 11:05), Max: 38.1 (26 Jul 2021 14:35)  T(F): 99.8 (27 Jul 2021 11:05), Max: 100.6 (26 Jul 2021 14:35)  HR: 112 (27 Jul 2021 11:05) (94 - 112)  BP: 126/75 (27 Jul 2021 11:05) (117/72 - 144/77)  BP(mean): --  RR: 18 (27 Jul 2021 11:05) (18 - 18)  SpO2: 95% (27 Jul 2021 11:05) (94% - 97%)  No Known Allergies      PHYSICAL EXAM:  GENERAL: NAD, well-groomed, well-developed  HEAD:  Atraumatic, Normocephalic  EYES: EOMI, PERRLA, conjunctiva and sclera clear  ENMT: No tonsillar erythema, exudates, or enlargement; Moist mucous membranes, Good dentition, No lesions  NECK: Supple, No JVD, Normal thyroid  NERVOUS SYSTEM:  Alert & Oriented X3, Good concentration; Motor Strength 5/5 B/L upper and lower extremities; DTRs 2+ intact and symmetric  CHEST/LUNG: No rales, rhonchi, wheezing, or rubs  HEART: Regular rate and rhythm; No murmurs, rubs, or gallops  ABDOMEN: Soft, Nontender, Nondistended; Bowel sounds present  EXTREMITIES:  2+ Peripheral Pulses, No clubbing, cyanosis, or edema  LYMPH: No lymphadenopathy noted  SKIN: No rashes or lesions    Consultant(s) Notes Reviewed:  [x ] YES  [ ] NO  Care Discussed with Consultants/Other Providers [ x] YES  [ ] NO    LABS:      Culture - Acid Fast - Sputum w/Smear (collected 07-26-21 @ 17:53)  Source: .Sputum Sputum      RADIOLOGY & ADDITIONAL TESTS:    Imaging Personally Reviewed:  [ ] YES  [ ] NO  acetaminophen   Tablet .. 650 milliGRAM(s) Oral every 6 hours PRN  aspirin enteric coated 81 milliGRAM(s) Oral daily  atorvastatin 20 milliGRAM(s) Oral at bedtime  azithromycin   Tablet 500 milliGRAM(s) Oral daily  dextrose 40% Gel 15 Gram(s) Oral once  dextrose 5%. 1000 milliLiter(s) IV Continuous <Continuous>  dextrose 5%. 1000 milliLiter(s) IV Continuous <Continuous>  dextrose 50% Injectable 25 Gram(s) IV Push once  dextrose 50% Injectable 12.5 Gram(s) IV Push once  dextrose 50% Injectable 25 Gram(s) IV Push once  glucagon  Injectable 1 milliGRAM(s) IntraMuscular once  insulin lispro (ADMELOG) corrective regimen sliding scale   SubCutaneous three times a day before meals  meropenem  IVPB 1000 milliGRAM(s) IV Intermittent every 8 hours      HEALTH ISSUES - PROBLEM Dx:  Pneumonia of right lower lobe due to infectious organism  Pneumonia of right lower lobe due to infectious organism    Type 2 diabetes mellitus without complication, without long-term current use of insulin  Type 2 diabetes mellitus without complication, without long-term current use of insulin    Dyslipidemia  Dyslipidemia    Preventive measure  Preventive measure

## 2021-07-29 NOTE — PROGRESS NOTE ADULT - SUBJECTIVE AND OBJECTIVE BOX
INTERVAL HPI:  Interviewed with help of Language line solution( Danny # [id number]).  61M with a PMH of latent TB?, DM, HLD who presents to the ED for non productive cough, fever for two weeks and exertional dyspnea for one week.  Saw physician, given Levaquin 1st then changed to Augmentin which he stopped due to diarrhea.  In ED with tachycardia, tachypnea and temperature of 102.1.  CTA showed large loculated right sided pleural effusion with volume loss.    Was admitted here in July 2020 with cough, fever, poor appetite and weight loss. At that time found to have hyperglycemia, RUL lung opacities.   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  07/24/20: Had bronchoscopy with BAL and biopsy( showed chronic inflammation, fibrosis).  Sedrate less than 5 x 2.    HIV negative.  ACE level normal.  DANIELLE, C-ANCA, P- ANCA  negative.  Hepatitis negative.  Suspicious for MTB was low, got discharged,  and claims that had a tele visit with DR. Riddle, no meds started.    OVERNIGHT EVENTS:  T max of more than 101 noted.    Vital Signs Last 24 Hrs  T(C): 37.3 (29 Jul 2021 11:40), Max: 38.7 (28 Jul 2021 21:51)  T(F): 99.1 (29 Jul 2021 11:40), Max: 101.7 (28 Jul 2021 21:51)  HR: 103 (29 Jul 2021 11:40) (98 - 117)  BP: 128/73 (29 Jul 2021 11:40) (100/68 - 128/73)  BP(mean): --  RR: 18 (29 Jul 2021 11:40) (18 - 20)  SpO2: 95% (29 Jul 2021 11:40) (94% - 97%)    PHYSICAL EXAM:  GEN:         Awake, responsive and comfortable.  HEENT:    Normal.    RESP:       no distress  CVS:          Regular rate and rhythm.   ABD:         Soft, non-tender, non-distended;     MEDICATIONS  (STANDING):  aspirin enteric coated 81 milliGRAM(s) Oral daily  atorvastatin 20 milliGRAM(s) Oral at bedtime  azithromycin   Tablet 500 milliGRAM(s) Oral daily  dextrose 40% Gel 15 Gram(s) Oral once  dextrose 5%. 1000 milliLiter(s) (50 mL/Hr) IV Continuous <Continuous>  dextrose 5%. 1000 milliLiter(s) (100 mL/Hr) IV Continuous <Continuous>  dextrose 50% Injectable 25 Gram(s) IV Push once  dextrose 50% Injectable 12.5 Gram(s) IV Push once  dextrose 50% Injectable 25 Gram(s) IV Push once  glucagon  Injectable 1 milliGRAM(s) IntraMuscular once  insulin lispro (ADMELOG) corrective regimen sliding scale   SubCutaneous three times a day before meals  meropenem  IVPB 1000 milliGRAM(s) IV Intermittent every 8 hours  vancomycin  IVPB 750 milliGRAM(s) IV Intermittent every 12 hours    MEDICATIONS  (PRN):  acetaminophen   Tablet .. 650 milliGRAM(s) Oral every 6 hours PRN Temp greater or equal to 38C (100.4F)    LABS:                        11.4   9.49  )-----------( 690      ( 28 Jul 2021 10:31 )             32.8     07-28    131<L>  |  98  |  13  ----------------------------<  131<H>  4.1   |  26  |  0.97    Ca    9.1      28 Jul 2021 10:31    TPro  8.3  /  Alb  x   /  TBili  x   /  DBili  x   /  AST  x   /  ALT  x   /  AlkPhos  x   07-28    PT/INR - ( 28 Jul 2021 10:31 )   PT: 15.4 sec;   INR: 1.35 ratio        ASSESSMENT AND PLAN:  ·	SOB.  ·	Large loculated right pleural effusion.  ·	Anemia.  ·	Hyponatremia.  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  ·	HLD    Pleural fluid is Lymphocytic exudate.  NO AFB.  No malignant cells.  On empiric antibiotics.  Treatment for Mycobacterium abscessus prolonged, difficult.   Compliance may also be a main issue.  Discussed with Dr. Riddle, she will review old records.

## 2021-07-30 LAB
ANION GAP SERPL CALC-SCNC: 4 MMOL/L — LOW (ref 5–17)
BUN SERPL-MCNC: 14 MG/DL — SIGNIFICANT CHANGE UP (ref 7–23)
CALCIUM SERPL-MCNC: 9 MG/DL — SIGNIFICANT CHANGE UP (ref 8.5–10.1)
CHLORIDE SERPL-SCNC: 97 MMOL/L — SIGNIFICANT CHANGE UP (ref 96–108)
CO2 SERPL-SCNC: 29 MMOL/L — SIGNIFICANT CHANGE UP (ref 22–31)
CREAT SERPL-MCNC: 0.93 MG/DL — SIGNIFICANT CHANGE UP (ref 0.5–1.3)
GLUCOSE BLDC GLUCOMTR-MCNC: 122 MG/DL — HIGH (ref 70–99)
GLUCOSE BLDC GLUCOMTR-MCNC: 140 MG/DL — HIGH (ref 70–99)
GLUCOSE BLDC GLUCOMTR-MCNC: 172 MG/DL — HIGH (ref 70–99)
GLUCOSE BLDC GLUCOMTR-MCNC: 173 MG/DL — HIGH (ref 70–99)
GLUCOSE SERPL-MCNC: 138 MG/DL — HIGH (ref 70–99)
HCT VFR BLD CALC: 35.2 % — LOW (ref 39–50)
HGB BLD-MCNC: 12.1 G/DL — LOW (ref 13–17)
MCHC RBC-ENTMCNC: 23 PG — LOW (ref 27–34)
MCHC RBC-ENTMCNC: 34.4 GM/DL — SIGNIFICANT CHANGE UP (ref 32–36)
MCV RBC AUTO: 66.8 FL — LOW (ref 80–100)
NIGHT BLUE STAIN TISS: SIGNIFICANT CHANGE UP
NIGHT BLUE STAIN TISS: SIGNIFICANT CHANGE UP
NRBC # BLD: 0 /100 WBCS — SIGNIFICANT CHANGE UP (ref 0–0)
PLATELET # BLD AUTO: 792 K/UL — HIGH (ref 150–400)
POTASSIUM SERPL-MCNC: 4.1 MMOL/L — SIGNIFICANT CHANGE UP (ref 3.5–5.3)
POTASSIUM SERPL-SCNC: 4.1 MMOL/L — SIGNIFICANT CHANGE UP (ref 3.5–5.3)
RBC # BLD: 5.27 M/UL — SIGNIFICANT CHANGE UP (ref 4.2–5.8)
RBC # FLD: 18.6 % — HIGH (ref 10.3–14.5)
SODIUM SERPL-SCNC: 130 MMOL/L — LOW (ref 135–145)
SPECIMEN SOURCE: SIGNIFICANT CHANGE UP
SPECIMEN SOURCE: SIGNIFICANT CHANGE UP
VANCOMYCIN TROUGH SERPL-MCNC: 10.1 UG/ML — SIGNIFICANT CHANGE UP (ref 10–20)
VANCOMYCIN TROUGH SERPL-MCNC: 6.7 UG/ML — LOW (ref 10–20)
WBC # BLD: 7.44 K/UL — SIGNIFICANT CHANGE UP (ref 3.8–10.5)
WBC # FLD AUTO: 7.44 K/UL — SIGNIFICANT CHANGE UP (ref 3.8–10.5)

## 2021-07-30 PROCEDURE — 71045 X-RAY EXAM CHEST 1 VIEW: CPT | Mod: 26

## 2021-07-30 PROCEDURE — 99233 SBSQ HOSP IP/OBS HIGH 50: CPT

## 2021-07-30 RX ORDER — SODIUM CHLORIDE 9 MG/ML
1 INJECTION INTRAMUSCULAR; INTRAVENOUS; SUBCUTANEOUS DAILY
Refills: 0 | Status: DISCONTINUED | OUTPATIENT
Start: 2021-07-30 | End: 2021-07-30

## 2021-07-30 RX ADMIN — MEROPENEM 100 MILLIGRAM(S): 1 INJECTION INTRAVENOUS at 14:07

## 2021-07-30 RX ADMIN — ATORVASTATIN CALCIUM 20 MILLIGRAM(S): 80 TABLET, FILM COATED ORAL at 22:01

## 2021-07-30 RX ADMIN — Medication 650 MILLIGRAM(S): at 01:00

## 2021-07-30 RX ADMIN — Medication 650 MILLIGRAM(S): at 00:03

## 2021-07-30 RX ADMIN — MEROPENEM 100 MILLIGRAM(S): 1 INJECTION INTRAVENOUS at 22:01

## 2021-07-30 RX ADMIN — MEROPENEM 100 MILLIGRAM(S): 1 INJECTION INTRAVENOUS at 05:11

## 2021-07-30 RX ADMIN — Medication 250 MILLIGRAM(S): at 08:04

## 2021-07-30 RX ADMIN — Medication 250 MILLIGRAM(S): at 20:43

## 2021-07-30 RX ADMIN — Medication 650 MILLIGRAM(S): at 17:17

## 2021-07-30 RX ADMIN — Medication 1: at 11:11

## 2021-07-30 RX ADMIN — Medication 650 MILLIGRAM(S): at 17:16

## 2021-07-30 RX ADMIN — Medication 81 MILLIGRAM(S): at 11:10

## 2021-07-30 RX ADMIN — AZITHROMYCIN 500 MILLIGRAM(S): 500 TABLET, FILM COATED ORAL at 11:10

## 2021-07-30 NOTE — PROGRESS NOTE ADULT - ASSESSMENT
60 yo man with a pmh of latent TB?, DM, HLD who presents to the ED for fever and dyspnea and sporadic fever without chills.      r/o pneumonia vs Nontuberculous mycobacteria  presented with Sepsis and  right upper lobe peripheral opacities with New rt loculated pleural effusion   SEEN BY US on 7/2020 for r/o non TB mycobacterial infection ( neg for ARELY and MTB ) s/p bronchoscopy , HIV negative.  ACE level normal, DANIELLE, C-ANCA, P- ANCA  negative , hiv neg on previous admission   i reviewed previous AFB culture on 7/9/2020 showed myco abscess pcr pos, erm pos inducible clarithromycin resistant in sputum AFB , neg on BAL fluid , patient was not seen in ID clinic after discharge bc of his insurance issues , presented again here for fever and cough , new large effusion   mild transaminitis   still with fever last night   ct - Large right loculated pleural effusion with associated atelectasis, new since the previous exam.  Previously seen irregular right upper lobe peripheral opacities remain and are indeterminant.  flu and covid 19 pcr neg   procal 0.24   no need isolation as NO suspicion of Tuberculosis , just to r/o nontuberculous mycobacteria only   s/p thoracentesis for rt large effusion  pleural fluid analysis,  AFB smear is neg, i spoke with micro, they will keep for pleural fluid and sputum AFB culture to grow   mycobacterium abscess is rapid growing mycobacteria, will fu culture   continue antibiotics   fu more sputum AFB culture , pt cough is not productive, discussed with floor RN for sputum induction  we will fu  monitor for fever   case discussed with patient this morning interpretation helped by aid who speak creole

## 2021-07-30 NOTE — PROGRESS NOTE ADULT - SUBJECTIVE AND OBJECTIVE BOX
INTERVAL HPI:   Interviewed with help of Language line solution( Danny # [id number]).  61M with a PMH of latent TB?, DM, HLD who presents to the ED for non productive cough, fever for two weeks and exertional dyspnea for one week.  Saw physician, given Levaquin 1st then changed to Augmentin which he stopped due to diarrhea.  In ED with tachycardia, tachypnea and temperature of 102.1.  CTA showed large loculated right sided pleural effusion with volume loss.    Was admitted here in July 2020 with cough, fever, poor appetite and weight loss. At that time found to have hyperglycemia, RUL lung opacities.   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  07/24/20: Had bronchoscopy with BAL and biopsy( showed chronic inflammation, fibrosis).  Sedrate less than 5 x 2.    HIV negative.  ACE level normal.  DANIELLE, C-ANCA, P- ANCA  negative.  Hepatitis negative.  Suspicious for MTB was low, got discharged,  and claims that had a tele visit with DR. Riddle, no meds started.    OVERNIGHT EVENTS:  Continues to spike fever    Vital Signs Last 24 Hrs  T(C): 37 (30 Jul 2021 17:14), Max: 38.6 (30 Jul 2021 00:10)  T(F): 98.6 (30 Jul 2021 17:14), Max: 101.4 (30 Jul 2021 00:10)  HR: 110 (30 Jul 2021 17:14) (85 - 110)  BP: 125/70 (30 Jul 2021 17:14) (106/69 - 131/79)  BP(mean): --  RR: 18 (30 Jul 2021 17:14) (18 - 18)  SpO2: 98% (30 Jul 2021 17:14) (95% - 98%)    PHYSICAL EXAM:  GEN:         Awake, responsive and comfortable.  HEENT:    Normal.    RESP:       no distress  CVS:         Regular rate and rhythm.   ABD:         Soft, non-tender, non-distended;     MEDICATIONS  (STANDING):  aspirin enteric coated 81 milliGRAM(s) Oral daily  atorvastatin 20 milliGRAM(s) Oral at bedtime  azithromycin   Tablet 500 milliGRAM(s) Oral daily  dextrose 40% Gel 15 Gram(s) Oral once  dextrose 5%. 1000 milliLiter(s) (50 mL/Hr) IV Continuous <Continuous>  dextrose 5%. 1000 milliLiter(s) (100 mL/Hr) IV Continuous <Continuous>  dextrose 50% Injectable 25 Gram(s) IV Push once  dextrose 50% Injectable 12.5 Gram(s) IV Push once  dextrose 50% Injectable 25 Gram(s) IV Push once  glucagon  Injectable 1 milliGRAM(s) IntraMuscular once  insulin lispro (ADMELOG) corrective regimen sliding scale   SubCutaneous three times a day before meals  meropenem  IVPB 1000 milliGRAM(s) IV Intermittent every 8 hours  vancomycin  IVPB 750 milliGRAM(s) IV Intermittent every 12 hours    MEDICATIONS  (PRN):  acetaminophen   Tablet .. 650 milliGRAM(s) Oral every 6 hours PRN Temp greater or equal to 38C (100.4F)    LABS:                        12.1   7.44  )-----------( 792      ( 30 Jul 2021 07:20 )             35.2     07-30    130<L>  |  97  |  14  ----------------------------<  138<H>  4.1   |  29  |  0.93    Ca    9.0      30 Jul 2021 07:20     ASSESSMENT AND PLAN:  ·	SOB.  ·	Large loculated right pleural effusion.  ·	Anemia.  ·	Hyponatremia.  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  ·	HLD      Pleural fluid is Lymphocytic exudate.  NO AFB.  No malignant cells.  On empiric antibiotics.  Treatment for Mycobacterium abscessus prolonged, difficult.   Compliance may also be a main issue.  On antibiotics per ID.  Will repeat chest CT as now pleural fluid is drained, continues to have fever.

## 2021-07-30 NOTE — PROGRESS NOTE ADULT - SUBJECTIVE AND OBJECTIVE BOX
ALIX CORRALES  61y  Male      Patient is a 61y old  Male who presents with a chief complaint of dyspnea (27 Jul 2021 12:45)      INTERVAL HPI/OVERNIGHT EVENTS: No acute events overnight. Patient has no new complaints. Febrile overnight. Phone call with Ingrid cherry, at beside.      REVIEW OF SYSTEMS:  CONSTITUTIONAL: No fever, weight loss, or fatigue  EYES: No eye pain, visual disturbances, or discharge  ENMT:  No difficulty hearing, tinnitus, vertigo; No sinus or throat pain  NECK: No pain or stiffness  RESPIRATORY: No cough, wheezing, chills or hemoptysis; No shortness of breath  CARDIOVASCULAR: No chest pain, palpitations, dizziness, or leg swelling  GASTROINTESTINAL: No abdominal or epigastric pain. No nausea, vomiting, or hematemesis; No diarrhea or constipation. No melena or hematochezia.  GENITOURINARY: No dysuria, frequency, hematuria, or incontinence  NEUROLOGICAL: No headaches, memory loss, loss of strength, numbness, or tremors  SKIN: No itching, burning, rashes, or lesions   MUSCULOSKELETAL: No joint pain or swelling; No muscle, back, or extremity pain  PSYCHIATRIC: No depression, anxiety, mood swings, or difficulty sleeping  FAMILY HISTORY:    T(C): 37.7 (07-27-21 @ 11:05), Max: 38.1 (07-26-21 @ 14:35)  HR: 112 (07-27-21 @ 11:05) (94 - 112)  BP: 126/75 (07-27-21 @ 11:05) (117/72 - 144/77)  RR: 18 (07-27-21 @ 11:05) (18 - 18)  SpO2: 95% (07-27-21 @ 11:05) (94% - 97%)  Wt(kg): --Vital Signs Last 24 Hrs  T(C): 37.7 (27 Jul 2021 11:05), Max: 38.1 (26 Jul 2021 14:35)  T(F): 99.8 (27 Jul 2021 11:05), Max: 100.6 (26 Jul 2021 14:35)  HR: 112 (27 Jul 2021 11:05) (94 - 112)  BP: 126/75 (27 Jul 2021 11:05) (117/72 - 144/77)  BP(mean): --  RR: 18 (27 Jul 2021 11:05) (18 - 18)  SpO2: 95% (27 Jul 2021 11:05) (94% - 97%)  No Known Allergies      PHYSICAL EXAM:  GENERAL: NAD, well-groomed, well-developed  HEAD:  Atraumatic, Normocephalic  EYES: EOMI, PERRLA, conjunctiva and sclera clear  ENMT: No tonsillar erythema, exudates, or enlargement; Moist mucous membranes, Good dentition, No lesions  NECK: Supple, No JVD, Normal thyroid  NERVOUS SYSTEM:  Alert & Oriented X3, Good concentration; Motor Strength 5/5 B/L upper and lower extremities; DTRs 2+ intact and symmetric  CHEST/LUNG: No rales, rhonchi, wheezing, or rubs  HEART: Regular rate and rhythm; No murmurs, rubs, or gallops  ABDOMEN: Soft, Nontender, Nondistended; Bowel sounds present  EXTREMITIES:  2+ Peripheral Pulses, No clubbing, cyanosis, or edema  LYMPH: No lymphadenopathy noted  SKIN: No rashes or lesions    Consultant(s) Notes Reviewed:  [x ] YES  [ ] NO  Care Discussed with Consultants/Other Providers [ x] YES  [ ] NO    LABS:      Culture - Acid Fast - Sputum w/Smear (collected 07-26-21 @ 17:53)  Source: .Sputum Sputum      RADIOLOGY & ADDITIONAL TESTS:    Imaging Personally Reviewed:  [ ] YES  [ ] NO  acetaminophen   Tablet .. 650 milliGRAM(s) Oral every 6 hours PRN  aspirin enteric coated 81 milliGRAM(s) Oral daily  atorvastatin 20 milliGRAM(s) Oral at bedtime  azithromycin   Tablet 500 milliGRAM(s) Oral daily  dextrose 40% Gel 15 Gram(s) Oral once  dextrose 5%. 1000 milliLiter(s) IV Continuous <Continuous>  dextrose 5%. 1000 milliLiter(s) IV Continuous <Continuous>  dextrose 50% Injectable 25 Gram(s) IV Push once  dextrose 50% Injectable 12.5 Gram(s) IV Push once  dextrose 50% Injectable 25 Gram(s) IV Push once  glucagon  Injectable 1 milliGRAM(s) IntraMuscular once  insulin lispro (ADMELOG) corrective regimen sliding scale   SubCutaneous three times a day before meals  meropenem  IVPB 1000 milliGRAM(s) IV Intermittent every 8 hours      HEALTH ISSUES - PROBLEM Dx:  Pneumonia of right lower lobe due to infectious organism  Pneumonia of right lower lobe due to infectious organism    Type 2 diabetes mellitus without complication, without long-term current use of insulin  Type 2 diabetes mellitus without complication, without long-term current use of insulin    Dyslipidemia  Dyslipidemia    Preventive measure  Preventive measure             ALIX CORRALES  61y  Male      Patient is a 61y old  Male who presents with a chief complaint of dyspnea (27 Jul 2021 12:45)      INTERVAL HPI/OVERNIGHT EVENTS: No acute events overnight. Patient has no new complaints. Febrile overnight again. Phone call with Ingrid cherry, at beside.      REVIEW OF SYSTEMS:  CONSTITUTIONAL: No fever, weight loss, or fatigue  EYES: No eye pain, visual disturbances, or discharge  ENMT:  No difficulty hearing, tinnitus, vertigo; No sinus or throat pain  NECK: No pain or stiffness  RESPIRATORY: No cough, wheezing, chills or hemoptysis; No shortness of breath  CARDIOVASCULAR: No chest pain, palpitations, dizziness, or leg swelling  GASTROINTESTINAL: No abdominal or epigastric pain. No nausea, vomiting, or hematemesis; No diarrhea or constipation. No melena or hematochezia.  GENITOURINARY: No dysuria, frequency, hematuria, or incontinence  NEUROLOGICAL: No headaches, memory loss, loss of strength, numbness, or tremors  SKIN: No itching, burning, rashes, or lesions   MUSCULOSKELETAL: No joint pain or swelling; No muscle, back, or extremity pain  PSYCHIATRIC: No depression, anxiety, mood swings, or difficulty sleeping  FAMILY HISTORY:    T(C): 37.7 (07-27-21 @ 11:05), Max: 38.1 (07-26-21 @ 14:35)  HR: 112 (07-27-21 @ 11:05) (94 - 112)  BP: 126/75 (07-27-21 @ 11:05) (117/72 - 144/77)  RR: 18 (07-27-21 @ 11:05) (18 - 18)  SpO2: 95% (07-27-21 @ 11:05) (94% - 97%)  Wt(kg): --Vital Signs Last 24 Hrs  T(C): 37.7 (27 Jul 2021 11:05), Max: 38.1 (26 Jul 2021 14:35)  T(F): 99.8 (27 Jul 2021 11:05), Max: 100.6 (26 Jul 2021 14:35)  HR: 112 (27 Jul 2021 11:05) (94 - 112)  BP: 126/75 (27 Jul 2021 11:05) (117/72 - 144/77)  BP(mean): --  RR: 18 (27 Jul 2021 11:05) (18 - 18)  SpO2: 95% (27 Jul 2021 11:05) (94% - 97%)  No Known Allergies      PHYSICAL EXAM:  GENERAL: NAD, well-groomed, well-developed  HEAD:  Atraumatic, Normocephalic  EYES: EOMI, PERRLA, conjunctiva and sclera clear  ENMT: No tonsillar erythema, exudates, or enlargement; Moist mucous membranes, Good dentition, No lesions  NECK: Supple, No JVD, Normal thyroid  NERVOUS SYSTEM:  Alert & Oriented X3, Good concentration; Motor Strength 5/5 B/L upper and lower extremities; DTRs 2+ intact and symmetric  CHEST/LUNG: No rales, rhonchi, wheezing, or rubs  HEART: Regular rate and rhythm; No murmurs, rubs, or gallops  ABDOMEN: Soft, Nontender, Nondistended; Bowel sounds present  EXTREMITIES:  2+ Peripheral Pulses, No clubbing, cyanosis, or edema  LYMPH: No lymphadenopathy noted  SKIN: No rashes or lesions    Consultant(s) Notes Reviewed:  [x ] YES  [ ] NO  Care Discussed with Consultants/Other Providers [ x] YES  [ ] NO    LABS:      Culture - Acid Fast - Sputum w/Smear (collected 07-26-21 @ 17:53)  Source: .Sputum Sputum      RADIOLOGY & ADDITIONAL TESTS:    Imaging Personally Reviewed:  [ ] YES  [ ] NO  acetaminophen   Tablet .. 650 milliGRAM(s) Oral every 6 hours PRN  aspirin enteric coated 81 milliGRAM(s) Oral daily  atorvastatin 20 milliGRAM(s) Oral at bedtime  azithromycin   Tablet 500 milliGRAM(s) Oral daily  dextrose 40% Gel 15 Gram(s) Oral once  dextrose 5%. 1000 milliLiter(s) IV Continuous <Continuous>  dextrose 5%. 1000 milliLiter(s) IV Continuous <Continuous>  dextrose 50% Injectable 25 Gram(s) IV Push once  dextrose 50% Injectable 12.5 Gram(s) IV Push once  dextrose 50% Injectable 25 Gram(s) IV Push once  glucagon  Injectable 1 milliGRAM(s) IntraMuscular once  insulin lispro (ADMELOG) corrective regimen sliding scale   SubCutaneous three times a day before meals  meropenem  IVPB 1000 milliGRAM(s) IV Intermittent every 8 hours      HEALTH ISSUES - PROBLEM Dx:  Pneumonia of right lower lobe due to infectious organism  Pneumonia of right lower lobe due to infectious organism    Type 2 diabetes mellitus without complication, without long-term current use of insulin  Type 2 diabetes mellitus without complication, without long-term current use of insulin    Dyslipidemia  Dyslipidemia    Preventive measure  Preventive measure

## 2021-07-30 NOTE — PROGRESS NOTE ADULT - SUBJECTIVE AND OBJECTIVE BOX
Patient is a 61y old  Male who presents with a chief complaint of dyspnea (27 Jul 2021 12:45)  seen in morning , fever spike note, breathing better after thoracentesis, no new complaint.     REVIEW OF SYSTEMS: interpretation helped by floor aid   CONSTITUTIONAL: + fever, no weight loss, or fatigue  RESPIRATORY: + Dry cough, no wheezing, chills or hemoptysis; No shortness of breath  CARDIOVASCULAR: No chest pain, palpitations, dizziness, or leg swelling  GASTROINTESTINAL: No abdominal or epigastric pain. No nausea, vomiting, or hematemesis; No diarrhea or constipation. No melena or hematochezia.  GENITOURINARY: No dysuria, frequency, hematuria, or incontinence  NEUROLOGICAL: No headaches, memory loss, loss of strength, numbness, or tremors  SKIN: No itching, burning, rashes, or lesions       T(C): 37.7 (07-27-21 @ 11:05), Max: 38.1 (07-26-21 @ 14:35)  HR: 112 (07-27-21 @ 11:05) (94 - 112)  BP: 126/75 (07-27-21 @ 11:05) (117/72 - 144/77)  RR: 18 (07-27-21 @ 11:05) (18 - 18)  SpO2: 95% (07-27-21 @ 11:05) (94% - 97%)  Wt(kg): --Vital Signs Last 24 Hrs  T(C): 37.7 (27 Jul 2021 11:05), Max: 38.1 (26 Jul 2021 14:35)  T(F): 99.8 (27 Jul 2021 11:05), Max: 100.6 (26 Jul 2021 14:35)  HR: 112 (27 Jul 2021 11:05) (94 - 112)  BP: 126/75 (27 Jul 2021 11:05) (117/72 - 144/77)  BP(mean): --  RR: 18 (27 Jul 2021 11:05) (18 - 18)  SpO2: 95% (27 Jul 2021 11:05) (94% - 97%)  No Known Allergies      PHYSICAL EXAM:  GENERAL: NAD, nontoxic looking in RA   HEAD:  Atraumatic, Normocephalic  EYES: EOMI, PERRLA, conjunctiva and sclera clear  ENMT: No tonsillar erythema, exudates, or enlargement; Moist mucous membranes, Good dentition, No lesions  NECK: Supple, No JVD, Normal thyroid  NERVOUS SYSTEM:  Alert & Oriented X3  CHEST/LUNG: No rales, rhonchi, wheezing, or rubs  HEART: Regular rate and rhythm; No murmurs, rubs, or gallops  ABDOMEN: Soft, Nontender, Nondistended; Bowel sounds present  EXTREMITIES:  2+ Peripheral Pulses, No clubbing, cyanosis, or edema    Consultant(s) Notes Reviewed:  [x ] YES  [ ] NO  Care Discussed with Consultants/Other Providers [ x] YES  [ ] NO    LABS:      Culture - Acid Fast - Sputum w/Smear (collected 07-26-21 @ 17:53)  Source: .Sputum Sputum      RADIOLOGY & ADDITIONAL TESTS:    Imaging Personally Reviewed:  [ ] YES  [ ] NO  acetaminophen   Tablet .. 650 milliGRAM(s) Oral every 6 hours PRN  aspirin enteric coated 81 milliGRAM(s) Oral daily  atorvastatin 20 milliGRAM(s) Oral at bedtime  azithromycin   Tablet 500 milliGRAM(s) Oral daily  dextrose 40% Gel 15 Gram(s) Oral once  dextrose 5%. 1000 milliLiter(s) IV Continuous <Continuous>  dextrose 5%. 1000 milliLiter(s) IV Continuous <Continuous>  dextrose 50% Injectable 25 Gram(s) IV Push once  dextrose 50% Injectable 12.5 Gram(s) IV Push once  dextrose 50% Injectable 25 Gram(s) IV Push once  glucagon  Injectable 1 milliGRAM(s) IntraMuscular once  insulin lispro (ADMELOG) corrective regimen sliding scale   SubCutaneous three times a day before meals  meropenem  IVPB 1000 milliGRAM(s) IV Intermittent every 8 hours

## 2021-07-30 NOTE — PROGRESS NOTE ADULT - ASSESSMENT
Patient is a 61M with a PMH of latent TB?, DM, HLD who presents to the ED for fever and dyspnea.       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 Riddle, outpatient because of an insurance issue.  New right sided pleural effusion on CT  s/p  US thoracentesis 8/28  f/u Pulm and ID  Antibiotics per ID  No isolation needed per ID  Incentive spirometry  Repeat septic work up if febrile again    Hyponatremia  Fluid restriction    Type 2 diabetes mellitus without complication, without long-term current use of insulin.   insulin corrective scale.     Dyslipidemia  Lipitor     Microcytic anemia  Stable    DVT prop: Lovenox 40 daily.  Patient is a 61M with a PMH of latent TB?, DM, HLD who presents to the ED for fever and dyspnea.       Febrile overnight. Follow up CXR, blood and urine cultures. 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 Riddle, outpatient because of an insurance issue.  New right sided pleural effusion on CT  s/p  US thoracentesis 8/28  f/u Pulm and ID  Antibiotics per ID  No isolation needed per ID  Incentive spirometry      Hyponatremia, likely chronic  Patient is euvolemic  Fluid restriction  Salt tabs    Type 2 diabetes mellitus without complication, without long-term current use of insulin.   insulin corrective scale.     Dyslipidemia  Lipitor     Microcytic anemia  Stable    DVT prop: Lovenox 40 daily.  Patient is a 61M with a PMH of latent TB?, DM, HLD who presents to the ED for fever and dyspnea.       Febrile overnight. Follow up CXR, blood and urine cultures. 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 Riddle, outpatient because of an insurance issue.  New right sided pleural effusion on CT  s/p  US thoracentesis 8/28  f/u Pulm and ID  Antibiotics per ID  No isolation needed per ID  Incentive spirometry      Hyponatremia, likely chronic  Patient is euvolemic and symptomatic  Fluid restriction      Type 2 diabetes mellitus without complication, without long-term current use of insulin.   insulin corrective scale.     Dyslipidemia  Lipitor     Microcytic anemia  Stable    DVT prop: Lovenox 40 daily.

## 2021-07-31 LAB
CULTURE RESULTS: NO GROWTH — SIGNIFICANT CHANGE UP
CULTURE RESULTS: SIGNIFICANT CHANGE UP
CULTURE RESULTS: SIGNIFICANT CHANGE UP
GLUCOSE BLDC GLUCOMTR-MCNC: 133 MG/DL — HIGH (ref 70–99)
GLUCOSE BLDC GLUCOMTR-MCNC: 143 MG/DL — HIGH (ref 70–99)
GLUCOSE BLDC GLUCOMTR-MCNC: 146 MG/DL — HIGH (ref 70–99)
GLUCOSE BLDC GLUCOMTR-MCNC: 190 MG/DL — HIGH (ref 70–99)
PROCALCITONIN SERPL-MCNC: 0.23 NG/ML — HIGH (ref 0.02–0.1)
SPECIMEN SOURCE: SIGNIFICANT CHANGE UP

## 2021-07-31 PROCEDURE — 71250 CT THORAX DX C-: CPT | Mod: 26

## 2021-07-31 PROCEDURE — 99233 SBSQ HOSP IP/OBS HIGH 50: CPT

## 2021-07-31 RX ADMIN — Medication 81 MILLIGRAM(S): at 13:50

## 2021-07-31 RX ADMIN — MEROPENEM 100 MILLIGRAM(S): 1 INJECTION INTRAVENOUS at 21:01

## 2021-07-31 RX ADMIN — MEROPENEM 100 MILLIGRAM(S): 1 INJECTION INTRAVENOUS at 13:49

## 2021-07-31 RX ADMIN — Medication 250 MILLIGRAM(S): at 08:05

## 2021-07-31 RX ADMIN — ATORVASTATIN CALCIUM 20 MILLIGRAM(S): 80 TABLET, FILM COATED ORAL at 21:01

## 2021-07-31 RX ADMIN — AZITHROMYCIN 500 MILLIGRAM(S): 500 TABLET, FILM COATED ORAL at 13:49

## 2021-07-31 RX ADMIN — Medication 250 MILLIGRAM(S): at 20:20

## 2021-07-31 RX ADMIN — MEROPENEM 100 MILLIGRAM(S): 1 INJECTION INTRAVENOUS at 06:15

## 2021-07-31 NOTE — CHART NOTE - NSCHARTNOTEFT_GEN_A_CORE
Pt w/ pneumonia of R lower lobe due to infectious organism w/ new right sided pleural effusion on CT. S/P thoracentesis on 7/28. Hyponatremia, likely chronic, pt is euvolemic & symptomatic, fluid restriction continues; DM 2 ; dyslipidemia ; microcytic anemia; severe protein calories malnutrition.     Factors impacting intake: [x ] none [ ] nausea  [ ] vomiting [ ] diarrhea [ ] constipation  [ ]chewing problems [ ] swallowing issues  [ ] other:     7/27 - Diet Prescription: Diet, Consistent Carbohydrate w/Evening Snack:   800mL Fluid Restriction ([id number])  Supplement Feeding Modality:  Oral  Glucerna Shake Cans or Servings Per Day:  1       Frequency:  Three Times a day (07-27-21 @ 13:51)    Intake: 100 % meals & Glucerna Shake 8 oz 3 times per day (660 may, 30 gm pro) . At bedside (breakfast)  reviewed fluid restriction. 6 bottles of water at tray bedside and 1 can of soda, ([place] [employer] CNA  helped to review importance of fluid restriction.)     Current Weight: Weight (kg): 50 (07-25 @ 23:09)  % Weight Change - NO WEIGHTS AVAILABLE      Pertinent Medications: MEDICATIONS  (STANDING):  aspirin enteric coated 81 milliGRAM(s) Oral daily  atorvastatin 20 milliGRAM(s) Oral at bedtime  azithromycin   Tablet 500 milliGRAM(s) Oral daily  dextrose 40% Gel 15 Gram(s) Oral once  dextrose 5%. 1000 milliLiter(s) (50 mL/Hr) IV Continuous <Continuous>  dextrose 5%. 1000 milliLiter(s) (100 mL/Hr) IV Continuous <Continuous>  dextrose 50% Injectable 25 Gram(s) IV Push once  dextrose 50% Injectable 12.5 Gram(s) IV Push once  dextrose 50% Injectable 25 Gram(s) IV Push once  glucagon  Injectable 1 milliGRAM(s) IntraMuscular once  insulin lispro (ADMELOG) corrective regimen sliding scale   SubCutaneous three times a day before meals  meropenem  IVPB 1000 milliGRAM(s) IV Intermittent every 8 hours  vancomycin  IVPB 750 milliGRAM(s) IV Intermittent every 12 hours    MEDICATIONS  (PRN):  acetaminophen   Tablet .. 650 milliGRAM(s) Oral every 6 hours PRN Temp greater or equal to 38C (100.4F)    Pertinent Labs: 07-30 Na130 mmol/L<L> Glu 138 mg/dL<H> K+ 4.1 mmol/L Cr  0.93 mg/dL BUN 14 mg/dL 07-25 Alb 3.4 g/dL07-25 ALT 63 U/L AST 61 U/L<H> Alkaline Phosphatase 177 U/L<H>07-26-21 @ 10:49 A1C 4.9       CAPILLARY BLOOD GLUCOSE      POCT Blood Glucose.: 143 mg/dL (31 Jul 2021 07:49)  POCT Blood Glucose.: 172 mg/dL (30 Jul 2021 20:41)  POCT Blood Glucose.: 122 mg/dL (30 Jul 2021 15:34)  POCT Blood Glucose.: 173 mg/dL (30 Jul 2021 11:07)    Skin: WDL    Estimated Needs:   [x ] no change since previous assessment (7/27/21  )  [ ] recalculated:     Nutrition Diagnosis:    Nutrition Diagnosis:  Nutrition diagnosis yes...     Nutrition Diagnostic Terminology #1 Malnutrition...     Malnutrition Severe malnutrition in the context of acute illness.     Etiology inadequate energy, protein intake in the presence of pneumonia, fevers.     Signs/Symptoms <50% nut'l intake >5days, 8% wt loss x <1 mo, physical signs of fat loss & muscle wasting as above.     Goal/Expected Outcome Pt will consume >/= 75% nutritional needs via meals and PO supplement. -Goal Met      Nutrition Diagnosis is [ ] ongoing  [x ] resolving [ ] not applicable     New Nutrition Diagnosis: [x ] not applicable       Interventions:   Recommend  [ x ] Continue current Diet as Rx'd  [ ] Change Diet To:  [ ] Nutrition Supplement  [ ] Nutrition Support  [x ] Other: obtain daily weights    Monitoring and Evaluation:   [x ] PO intake [ x ] Tolerance to diet prescription [ x ] weights [ x ] labs[ x ] follow up per protocol  [ ] other:

## 2021-07-31 NOTE — PROGRESS NOTE ADULT - SUBJECTIVE AND OBJECTIVE BOX
ALIX CORRALES  61y  Male      Patient is a 61y old  Male who presents with a chief complaint of dyspnea (27 Jul 2021 12:45)      INTERVAL HPI/OVERNIGHT EVENTS: No acute events overnight. Patient has no new complaints. Afebrile overnight Phone call with Ingrid cherry, at beside.      REVIEW OF SYSTEMS:  CONSTITUTIONAL: No fever, weight loss, or fatigue  EYES: No eye pain, visual disturbances, or discharge  ENMT:  No difficulty hearing, tinnitus, vertigo; No sinus or throat pain  NECK: No pain or stiffness  RESPIRATORY: No cough, wheezing, chills or hemoptysis; No shortness of breath  CARDIOVASCULAR: No chest pain, palpitations, dizziness, or leg swelling  GASTROINTESTINAL: No abdominal or epigastric pain. No nausea, vomiting, or hematemesis; No diarrhea or constipation. No melena or hematochezia.  GENITOURINARY: No dysuria, frequency, hematuria, or incontinence  NEUROLOGICAL: No headaches, memory loss, loss of strength, numbness, or tremors  SKIN: No itching, burning, rashes, or lesions   MUSCULOSKELETAL: No joint pain or swelling; No muscle, back, or extremity pain  PSYCHIATRIC: No depression, anxiety, mood swings, or difficulty sleeping  FAMILY HISTORY:    T(C): 37.7 (07-27-21 @ 11:05), Max: 38.1 (07-26-21 @ 14:35)  HR: 112 (07-27-21 @ 11:05) (94 - 112)  BP: 126/75 (07-27-21 @ 11:05) (117/72 - 144/77)  RR: 18 (07-27-21 @ 11:05) (18 - 18)  SpO2: 95% (07-27-21 @ 11:05) (94% - 97%)  Wt(kg): --Vital Signs Last 24 Hrs  T(C): 37.7 (27 Jul 2021 11:05), Max: 38.1 (26 Jul 2021 14:35)  T(F): 99.8 (27 Jul 2021 11:05), Max: 100.6 (26 Jul 2021 14:35)  HR: 112 (27 Jul 2021 11:05) (94 - 112)  BP: 126/75 (27 Jul 2021 11:05) (117/72 - 144/77)  BP(mean): --  RR: 18 (27 Jul 2021 11:05) (18 - 18)  SpO2: 95% (27 Jul 2021 11:05) (94% - 97%)  No Known Allergies      PHYSICAL EXAM:  GENERAL: NAD, well-groomed, well-developed  HEAD:  Atraumatic, Normocephalic  EYES: EOMI, PERRLA, conjunctiva and sclera clear  ENMT: No tonsillar erythema, exudates, or enlargement; Moist mucous membranes, Good dentition, No lesions  NECK: Supple, No JVD, Normal thyroid  NERVOUS SYSTEM:  Alert & Oriented X3, Good concentration; Motor Strength 5/5 B/L upper and lower extremities; DTRs 2+ intact and symmetric  CHEST/LUNG: No rales, rhonchi, wheezing, or rubs  HEART: Regular rate and rhythm; No murmurs, rubs, or gallops  ABDOMEN: Soft, Nontender, Nondistended; Bowel sounds present  EXTREMITIES:  2+ Peripheral Pulses, No clubbing, cyanosis, or edema  LYMPH: No lymphadenopathy noted  SKIN: No rashes or lesions    Consultant(s) Notes Reviewed:  [x ] YES  [ ] NO  Care Discussed with Consultants/Other Providers [ x] YES  [ ] NO    LABS:      Culture - Acid Fast - Sputum w/Smear (collected 07-26-21 @ 17:53)  Source: .Sputum Sputum      RADIOLOGY & ADDITIONAL TESTS:    Imaging Personally Reviewed:  [ ] YES  [ ] NO  acetaminophen   Tablet .. 650 milliGRAM(s) Oral every 6 hours PRN  aspirin enteric coated 81 milliGRAM(s) Oral daily  atorvastatin 20 milliGRAM(s) Oral at bedtime  azithromycin   Tablet 500 milliGRAM(s) Oral daily  dextrose 40% Gel 15 Gram(s) Oral once  dextrose 5%. 1000 milliLiter(s) IV Continuous <Continuous>  dextrose 5%. 1000 milliLiter(s) IV Continuous <Continuous>  dextrose 50% Injectable 25 Gram(s) IV Push once  dextrose 50% Injectable 12.5 Gram(s) IV Push once  dextrose 50% Injectable 25 Gram(s) IV Push once  glucagon  Injectable 1 milliGRAM(s) IntraMuscular once  insulin lispro (ADMELOG) corrective regimen sliding scale   SubCutaneous three times a day before meals  meropenem  IVPB 1000 milliGRAM(s) IV Intermittent every 8 hours      HEALTH ISSUES - PROBLEM Dx:  Pneumonia of right lower lobe due to infectious organism  Pneumonia of right lower lobe due to infectious organism    Type 2 diabetes mellitus without complication, without long-term current use of insulin  Type 2 diabetes mellitus without complication, without long-term current use of insulin    Dyslipidemia  Dyslipidemia    Preventive measure  Preventive measure

## 2021-07-31 NOTE — PROGRESS NOTE ADULT - ASSESSMENT
Patient is a 61M with a PMH of latent TB?, DM, HLD who presents to the ED for fever and dyspnea.       Afebrile overnight. 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 Riddle, outpatient because of an insurance issue.  New right sided pleural effusion on CT  s/p  US thoracentesis 8/28  AFB sputum negative x3  f/u Pulm and ID  Antibiotics per ID  No isolation needed per ID  Incentive spirometry      Hyponatremia, likely chronic  Patient is euvolemic and symptomatic  Fluid restriction      Type 2 diabetes mellitus without complication, without long-term current use of insulin.   insulin corrective scale.     Dyslipidemia  Lipitor     Microcytic anemia  Stable    DVT prop: Lovenox 40 daily.

## 2021-08-01 DIAGNOSIS — J18.9 PNEUMONIA, UNSPECIFIED ORGANISM: ICD-10-CM

## 2021-08-01 LAB
GLUCOSE BLDC GLUCOMTR-MCNC: 116 MG/DL — HIGH (ref 70–99)
GLUCOSE BLDC GLUCOMTR-MCNC: 125 MG/DL — HIGH (ref 70–99)
GLUCOSE BLDC GLUCOMTR-MCNC: 153 MG/DL — HIGH (ref 70–99)
GLUCOSE BLDC GLUCOMTR-MCNC: 221 MG/DL — HIGH (ref 70–99)
VANCOMYCIN TROUGH SERPL-MCNC: 11.3 UG/ML — SIGNIFICANT CHANGE UP (ref 10–20)

## 2021-08-01 PROCEDURE — 99233 SBSQ HOSP IP/OBS HIGH 50: CPT

## 2021-08-01 RX ORDER — ENOXAPARIN SODIUM 100 MG/ML
40 INJECTION SUBCUTANEOUS DAILY
Refills: 0 | Status: DISCONTINUED | OUTPATIENT
Start: 2021-08-01 | End: 2021-08-03

## 2021-08-01 RX ORDER — ACETAMINOPHEN 500 MG
650 TABLET ORAL ONCE
Refills: 0 | Status: COMPLETED | OUTPATIENT
Start: 2021-08-01 | End: 2021-08-01

## 2021-08-01 RX ADMIN — Medication 81 MILLIGRAM(S): at 11:20

## 2021-08-01 RX ADMIN — ENOXAPARIN SODIUM 40 MILLIGRAM(S): 100 INJECTION SUBCUTANEOUS at 13:19

## 2021-08-01 RX ADMIN — Medication 250 MILLIGRAM(S): at 20:41

## 2021-08-01 RX ADMIN — AZITHROMYCIN 500 MILLIGRAM(S): 500 TABLET, FILM COATED ORAL at 11:20

## 2021-08-01 RX ADMIN — Medication 650 MILLIGRAM(S): at 16:50

## 2021-08-01 RX ADMIN — Medication 2: at 11:17

## 2021-08-01 RX ADMIN — MEROPENEM 100 MILLIGRAM(S): 1 INJECTION INTRAVENOUS at 05:01

## 2021-08-01 RX ADMIN — MEROPENEM 100 MILLIGRAM(S): 1 INJECTION INTRAVENOUS at 13:19

## 2021-08-01 RX ADMIN — ATORVASTATIN CALCIUM 20 MILLIGRAM(S): 80 TABLET, FILM COATED ORAL at 21:46

## 2021-08-01 RX ADMIN — Medication 200 MILLIGRAM(S): at 13:19

## 2021-08-01 RX ADMIN — Medication 650 MILLIGRAM(S): at 16:20

## 2021-08-01 RX ADMIN — MEROPENEM 100 MILLIGRAM(S): 1 INJECTION INTRAVENOUS at 21:45

## 2021-08-01 RX ADMIN — Medication 250 MILLIGRAM(S): at 09:28

## 2021-08-01 NOTE — PROGRESS NOTE ADULT - SUBJECTIVE AND OBJECTIVE BOX
INTERVAL HPI:   Interviewed with help of Language line solution( Danny # [id number]).  61M with a PMH of latent TB?, DM, HLD who presents to the ED for non productive cough, fever for two weeks and exertional dyspnea for one week.  Saw physician, given Levaquin 1st then changed to Augmentin which he stopped due to diarrhea.  In ED with tachycardia, tachypnea and temperature of 102.1.  CTA showed large loculated right sided pleural effusion with volume loss.    Was admitted here in July 2020 with cough, fever, poor appetite and weight loss. At that time found to have hyperglycemia, RUL lung opacities.   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  07/24/20: Had bronchoscopy with BAL and biopsy( showed chronic inflammation, fibrosis).  Sedrate less than 5 x 2.    HIV negative.  ACE level normal.  DANIELLE, C-ANCA, P- ANCA  negative.  Hepatitis negative.  Suspicious for MTB was low, got discharged,  and claims that had a tele visit with DR. Ridlde, no meds started.    OVERNIGHT EVENTS:  Resting comfortably    Vital Signs Last 24 Hrs  T(C): 36.7 (01 Aug 2021 17:01), Max: 37.4 (01 Aug 2021 05:18)  T(F): 98.1 (01 Aug 2021 17:01), Max: 99.4 (01 Aug 2021 05:18)  HR: 103 (01 Aug 2021 17:01) (94 - 103)  BP: 100/61 (01 Aug 2021 17:01) (100/61 - 124/76)  BP(mean): --  RR: 18 (01 Aug 2021 17:01) (17 - 18)  SpO2: 95% (01 Aug 2021 17:01) (92% - 96%)    PHYSICAL EXAM:  GEN:       comfortable.  HEENT:    Normal.    RESP:        no distress  CVS:          Regular rate and rhythm.   ABD:         Soft, non-tender, non-distended;     MEDICATIONS  (STANDING):  aspirin enteric coated 81 milliGRAM(s) Oral daily  atorvastatin 20 milliGRAM(s) Oral at bedtime  azithromycin   Tablet 500 milliGRAM(s) Oral daily  dextrose 40% Gel 15 Gram(s) Oral once  dextrose 5%. 1000 milliLiter(s) (50 mL/Hr) IV Continuous <Continuous>  dextrose 5%. 1000 milliLiter(s) (100 mL/Hr) IV Continuous <Continuous>  dextrose 50% Injectable 25 Gram(s) IV Push once  dextrose 50% Injectable 12.5 Gram(s) IV Push once  dextrose 50% Injectable 25 Gram(s) IV Push once  enoxaparin Injectable 40 milliGRAM(s) SubCutaneous daily  glucagon  Injectable 1 milliGRAM(s) IntraMuscular once  insulin lispro (ADMELOG) corrective regimen sliding scale   SubCutaneous three times a day before meals  meropenem  IVPB 1000 milliGRAM(s) IV Intermittent every 8 hours  vancomycin  IVPB 750 milliGRAM(s) IV Intermittent every 12 hours    MEDICATIONS  (PRN):  acetaminophen   Tablet .. 650 milliGRAM(s) Oral every 6 hours PRN Temp greater or equal to 38C (100.4F)  guaiFENesin Oral Liquid (Sugar-Free) 200 milliGRAM(s) Oral every 6 hours PRN Cough     ASSESSMENT AND PLAN:  ·	SOB.  ·	Large loculated right pleural effusion.  ·	Anemia.  ·	Hyponatremia.  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  ·	HLD    Temperature is down ,  On antibiotics per ID.

## 2021-08-01 NOTE — PROGRESS NOTE ADULT - SUBJECTIVE AND OBJECTIVE BOX
ALIX CORRALES  61y  Male      Patient is a 61y old  Male who presents with a chief complaint of dyspnea (27 Jul 2021 12:45)      INTERVAL HPI/OVERNIGHT EVENTS: No acute events overnight. Patient has no new complaints. Afebrile overnight Phone call with mAanda cherry, at beside. Complaing of cough, will added anti-tussive       REVIEW OF SYSTEMS:  CONSTITUTIONAL: No fever, weight loss, or fatigue  EYES: No eye pain, visual disturbances, or discharge  ENMT:  No difficulty hearing, tinnitus, vertigo; No sinus or throat pain  NECK: No pain or stiffness  RESPIRATORY: No cough, wheezing, chills or hemoptysis; No shortness of breath  CARDIOVASCULAR: No chest pain, palpitations, dizziness, or leg swelling  GASTROINTESTINAL: No abdominal or epigastric pain. No nausea, vomiting, or hematemesis; No diarrhea or constipation. No melena or hematochezia.  GENITOURINARY: No dysuria, frequency, hematuria, or incontinence  NEUROLOGICAL: No headaches, memory loss, loss of strength, numbness, or tremors  SKIN: No itching, burning, rashes, or lesions   MUSCULOSKELETAL: No joint pain or swelling; No muscle, back, or extremity pain  PSYCHIATRIC: No depression, anxiety, mood swings, or difficulty sleeping  FAMILY HISTORY:    T(C): 37.7 (07-27-21 @ 11:05), Max: 38.1 (07-26-21 @ 14:35)  HR: 112 (07-27-21 @ 11:05) (94 - 112)  BP: 126/75 (07-27-21 @ 11:05) (117/72 - 144/77)  RR: 18 (07-27-21 @ 11:05) (18 - 18)  SpO2: 95% (07-27-21 @ 11:05) (94% - 97%)  Wt(kg): --Vital Signs Last 24 Hrs  T(C): 37.7 (27 Jul 2021 11:05), Max: 38.1 (26 Jul 2021 14:35)  T(F): 99.8 (27 Jul 2021 11:05), Max: 100.6 (26 Jul 2021 14:35)  HR: 112 (27 Jul 2021 11:05) (94 - 112)  BP: 126/75 (27 Jul 2021 11:05) (117/72 - 144/77)  BP(mean): --  RR: 18 (27 Jul 2021 11:05) (18 - 18)  SpO2: 95% (27 Jul 2021 11:05) (94% - 97%)  No Known Allergies      PHYSICAL EXAM:  GENERAL: NAD, well-groomed, well-developed  HEAD:  Atraumatic, Normocephalic  EYES: EOMI, PERRLA, conjunctiva and sclera clear  ENMT: No tonsillar erythema, exudates, or enlargement; Moist mucous membranes, Good dentition, No lesions  NECK: Supple, No JVD, Normal thyroid  NERVOUS SYSTEM:  Alert & Oriented X3, Good concentration; Motor Strength 5/5 B/L upper and lower extremities; DTRs 2+ intact and symmetric  CHEST/LUNG: No rales, rhonchi, wheezing, or rubs  HEART: Regular rate and rhythm; No murmurs, rubs, or gallops  ABDOMEN: Soft, Nontender, Nondistended; Bowel sounds present  EXTREMITIES:  2+ Peripheral Pulses, No clubbing, cyanosis, or edema  LYMPH: No lymphadenopathy noted  SKIN: No rashes or lesions    Consultant(s) Notes Reviewed:  [x ] YES  [ ] NO  Care Discussed with Consultants/Other Providers [ x] YES  [ ] NO    LABS:      Culture - Acid Fast - Sputum w/Smear (collected 07-26-21 @ 17:53)  Source: .Sputum Sputum      RADIOLOGY & ADDITIONAL TESTS:    Imaging Personally Reviewed:  [ ] YES  [ ] NO  acetaminophen   Tablet .. 650 milliGRAM(s) Oral every 6 hours PRN  aspirin enteric coated 81 milliGRAM(s) Oral daily  atorvastatin 20 milliGRAM(s) Oral at bedtime  azithromycin   Tablet 500 milliGRAM(s) Oral daily  dextrose 40% Gel 15 Gram(s) Oral once  dextrose 5%. 1000 milliLiter(s) IV Continuous <Continuous>  dextrose 5%. 1000 milliLiter(s) IV Continuous <Continuous>  dextrose 50% Injectable 25 Gram(s) IV Push once  dextrose 50% Injectable 12.5 Gram(s) IV Push once  dextrose 50% Injectable 25 Gram(s) IV Push once  glucagon  Injectable 1 milliGRAM(s) IntraMuscular once  insulin lispro (ADMELOG) corrective regimen sliding scale   SubCutaneous three times a day before meals  meropenem  IVPB 1000 milliGRAM(s) IV Intermittent every 8 hours      HEALTH ISSUES - PROBLEM Dx:  Pneumonia of right lower lobe due to infectious organism  Pneumonia of right lower lobe due to infectious organism    Type 2 diabetes mellitus without complication, without long-term current use of insulin  Type 2 diabetes mellitus without complication, without long-term current use of insulin    Dyslipidemia  Dyslipidemia    Preventive measure  Preventive measure

## 2021-08-01 NOTE — PROGRESS NOTE ADULT - ASSESSMENT
62 yo man with a pmh of latent TB?, DM, HLD who presents to the ED for fever and dyspnea and sporadic fever without chills.      r/o pneumonia vs Nontuberculous mycobacteria  presented with Sepsis and  right upper lobe peripheral opacities with New rt loculated pleural effusion   SEEN BY US on 7/2020 for r/o non TB mycobacterial infection ( neg for ARELY and MTB ) s/p bronchoscopy , HIV negative.  ACE level normal, DANIELLE, C-ANCA, P- ANCA  negative , hiv neg on previous admission   i reviewed previous AFB culture on 7/9/2020 showed myco abscess pcr pos, erm pos inducible clarithromycin resistant in sputum AFB , neg on BAL fluid , patient was not seen in ID clinic after discharge bc of his insurance issues , presented again here for fever and cough , new large effusion   mild transaminitis   still with fever last night   ct - Large right loculated pleural effusion with associated atelectasis, new since the previous exam.  Previously seen irregular right upper lobe peripheral opacities remain and are indeterminant.  flu and covid 19 pcr neg   procal 0.24   no need isolation as NO suspicion of Tuberculosis , just to r/o nontuberculous mycobacteria only   s/p thoracentesis for rt large effusion  pleural fluid analysis,  AFB smear is neg, i spoke with micro, they will keep for pleural fluid and sputum AFB culture to grow   mycobacterium abscess is rapid growing mycobacteria, will fu culture   continue antibiotics   fu more sputum AFB culture , pt cough is not productive, discussed with floor RN for sputum induction  we will fu  monitor for fever   case discussed with patient this morning interpretation helped by aid who speak creole               62 yo man with a pmh of latent TB?, DM, HLD who presents to the ED for fever and dyspnea and sporadic fever without chills.      r/o pneumonia vs Nontuberculous mycobacteria  presented with Sepsis and  right upper lobe peripheral opacities with New rt loculated pleural effusion   SEEN BY US on 7/2020 for r/o non TB mycobacterial infection ( neg for ARELY and MTB ) s/p bronchoscopy , HIV negative.  ACE level normal, DANIELLE, C-ANCA, P- ANCA  negative , hiv neg on previous admission   i reviewed previous AFB culture on 7/9/2020 showed myco abscess pcr pos, erm pos inducible clarithromycin resistant in sputum AFB , neg on BAL fluid , patient was not seen in ID clinic after discharge bc of his insurance issues , presented again here for fever and cough , new large effusion   mild transaminitis   still with fever last night   ct - Large right loculated pleural effusion with associated atelectasis, new since the previous exam.  Previously seen irregular right upper lobe peripheral opacities remain and are indeterminant.  flu and covid 19 pcr neg   procal 0.24   no need isolation as NO suspicion of Tuberculosis , just to r/o nontuberculous mycobacteria only   s/p thoracentesis for rt large effusion  pleural fluid analysis,  AFB smear is neg, i spoke with micro, they will keep for pleural fluid and sputum AFB culture to grow   mycobacterium abscess is rapid growing mycobacteria, will fu culture   continue antibiotics   fu more sputum AFB culture , pt cough is not productive, discussed with floor RN for sputum induction  we will fu  monitor for fever   case discussed with dtr this morning on the phone who helped interpret for dad   dad in [facility] janthrough july 2020   was in [facility] in july ; did not see us in the office  dad [place] through oct then in [facility] for 2020 till jan 2021   [place] jan to april   back to [facility] ; fevers x 1 week in [facility] and then came here 2 weeks ago   he feels back to normal now

## 2021-08-01 NOTE — PROGRESS NOTE ADULT - SUBJECTIVE AND OBJECTIVE BOX
HPI:  Patient is a 61M with a PMH of latent TB?, DM, HLD who presents to the ED for fever and dyspnea.  Patient speaks primarily Creole, translation provided by language line # [id number].  Patient states that for the last two weeks he has had sporadic fever without chills.  Also notes that he has had worsening dyspnea, stating that he has trouble breathing when walking to the bathroom in his home and further has difficulty catching his breath when resting.  Also endorse non productive cough in a similar timeframe.  Patient was admitted to [facility] last year for suspected TB - was treated and had a bronchoscopy that was negative for MTB.  Denies hospitalization since that visit.  No other complaints.  Febrile in ED to 102.1, labs benign.  Will admit to med surg.  (25 Jul 2021 21:52)      Allergies    No Known Allergies    Intolerances        MEDICATIONS  (STANDING):  aspirin enteric coated 81 milliGRAM(s) Oral daily  atorvastatin 20 milliGRAM(s) Oral at bedtime  azithromycin   Tablet 500 milliGRAM(s) Oral daily  dextrose 40% Gel 15 Gram(s) Oral once  dextrose 5%. 1000 milliLiter(s) (50 mL/Hr) IV Continuous <Continuous>  dextrose 5%. 1000 milliLiter(s) (100 mL/Hr) IV Continuous <Continuous>  dextrose 50% Injectable 25 Gram(s) IV Push once  dextrose 50% Injectable 12.5 Gram(s) IV Push once  dextrose 50% Injectable 25 Gram(s) IV Push once  enoxaparin Injectable 40 milliGRAM(s) SubCutaneous daily  glucagon  Injectable 1 milliGRAM(s) IntraMuscular once  insulin lispro (ADMELOG) corrective regimen sliding scale   SubCutaneous three times a day before meals  meropenem  IVPB 1000 milliGRAM(s) IV Intermittent every 8 hours  vancomycin  IVPB 750 milliGRAM(s) IV Intermittent every 12 hours    MEDICATIONS  (PRN):  acetaminophen   Tablet .. 650 milliGRAM(s) Oral every 6 hours PRN Temp greater or equal to 38C (100.4F)  guaiFENesin Oral Liquid (Sugar-Free) 200 milliGRAM(s) Oral every 6 hours PRN Cough      REVIEW OF SYSTEMS:    CONSTITUTIONAL: No fever, chills, weight loss, or fatigue  HEENT: No sore throat, runny nose, ear ache  RESPIRATORY: No cough, wheezing, No shortness of breath  CARDIOVASCULAR: No chest pain, palpitations, dizziness  GASTROINTESTINAL: No abdominal pain. No nausea, vomiting, diarrhea  GENITOURINARY: No dysuria, increase frequency, hematuria, or incontinence  NEUROLOGICAL: No headaches, memory loss, loss of strength, numbness, or tremors, no weakness  EXTREMITY: No pedal edema BLE  SKIN: No itching, burning, rashes, or lesions     VITAL SIGNS:  T(C): 36.9 (08-01-21 @ 11:01), Max: 37.4 (08-01-21 @ 05:18)  T(F): 98.5 (08-01-21 @ 11:01), Max: 99.4 (08-01-21 @ 05:18)  HR: 100 (08-01-21 @ 11:01) (91 - 101)  BP: 124/76 (08-01-21 @ 11:01) (119/69 - 124/76)  RR: 17 (08-01-21 @ 11:01) (17 - 18)  SpO2: 95% (08-01-21 @ 11:01) (92% - 96%)  Wt(kg): --    PHYSICAL EXAM:    GENERAL: not in any distress  HEENT: Neck is supple, normocephalic, atraumatic   CHEST/LUNG: Clear to auscultation bilaterally; No rales, rhonchi, wheezing  HEART: Regular rate and rhythm; No murmurs, rubs, or gallops  ABDOMEN: Soft, Nontender, Nondistended; Bowel sounds present, no rebound   EXTREMITIES:  2+ Peripheral Pulses, No clubbing, cyanosis, or edema  GENITOURINARY:   SKIN: No rashes or lesions  BACK: no pressor sore   NERVOUS SYSTEM:  Alert & Oriented X3, Good concentration  PSYCH: normal affect     LABS:                               Vancomycin Level, Trough: 11.3 ug/mL (08-01 @ 08:10)        Culture Results:   No growth (07-30 @ 18:53)  Culture Results:   No growth to date. (07-30 @ 13:15)  Culture Results:   Culture is being performed. (07-30 @ 12:46)  Culture Results:   No growth to date. (07-30 @ 12:05)  Culture Results:   Culture is being performed. (07-29 @ 23:21)  Culture Results:   Culture is being performed. (07-28 @ 18:08)  Culture Results:   No growth (07-28 @ 14:38)  Culture Results:   No Blood Parasites observed by giemsa stain  One negative set of blood smears does not rule out  the possibility of a parasitic infection.  A minimum of 3  specimens should be collected, at least 12-24 hours apart,  over a 36 hour time period. (07-28 @ 09:00)  Culture Results:   No Blood Parasites observed by giemsa stain  One negative set of blood smears does not rule out  the possibility of a parasitic infection.  A minimum of 3  specimens should be collected, at least 12-24 hours apart,  over a 36 hour time period. (07-27 @ 12:39)  Culture Results:   Culture is being performed. (07-26 @ 17:53)  Culture Results:   No Blood Parasites observed by giemsa stain  One negative set of blood smears does not rule out  the possibility of a parasitic infection.  A minimum of 3  specimens should be collected, at least 12-24 hours apart,  over a 36 hour time period. (07-26 @ 16:11)  Culture Results:   No growth (07-26 @ 09:08)  Culture Results:   No Growth Final (07-26 @ 00:17)  Culture Results:   No Growth Final (07-26 @ 00:17)                Radiology:       HPI:  Patient is a 61M with a PMH of latent TB?, DM, HLD who presents to the ED for fever and dyspnea.  Patient speaks primarily Creole, translation provided by language line # [id number].  Patient states that for the last two weeks he has had sporadic fever without chills.  Also notes that he has had worsening dyspnea, stating that he has trouble breathing when walking to the bathroom in his home and further has difficulty catching his breath when resting.  Also endorse non productive cough in a similar timeframe.  Patient was admitted to [facility] last year for suspected TB - was treated and had a bronchoscopy that was negative for MTB.  Denies hospitalization since that visit.  No other complaints.  Febrile in ED to 102.1, labs benign.  Will admit to med surg.  (25 Jul 2021 21:52)  all events noted   talked to daughter on the phone       Allergies    No Known Allergies    Intolerances        MEDICATIONS  (STANDING):  aspirin enteric coated 81 milliGRAM(s) Oral daily  atorvastatin 20 milliGRAM(s) Oral at bedtime  azithromycin   Tablet 500 milliGRAM(s) Oral daily  dextrose 40% Gel 15 Gram(s) Oral once  dextrose 5%. 1000 milliLiter(s) (50 mL/Hr) IV Continuous <Continuous>  dextrose 5%. 1000 milliLiter(s) (100 mL/Hr) IV Continuous <Continuous>  dextrose 50% Injectable 25 Gram(s) IV Push once  dextrose 50% Injectable 12.5 Gram(s) IV Push once  dextrose 50% Injectable 25 Gram(s) IV Push once  enoxaparin Injectable 40 milliGRAM(s) SubCutaneous daily  glucagon  Injectable 1 milliGRAM(s) IntraMuscular once  insulin lispro (ADMELOG) corrective regimen sliding scale   SubCutaneous three times a day before meals  meropenem  IVPB 1000 milliGRAM(s) IV Intermittent every 8 hours  vancomycin  IVPB 750 milliGRAM(s) IV Intermittent every 12 hours    MEDICATIONS  (PRN):  acetaminophen   Tablet .. 650 milliGRAM(s) Oral every 6 hours PRN Temp greater or equal to 38C (100.4F)  guaiFENesin Oral Liquid (Sugar-Free) 200 milliGRAM(s) Oral every 6 hours PRN Cough      REVIEW OF SYSTEMS:    CONSTITUTIONAL: No fever, chills,   has weight loss,   fatigue resolved  HEENT: No sore throat, runny nose, ear ache  RESPIRATORY: dry cough,  no  wheezing, No shortness of breath  CARDIOVASCULAR: No chest pain, palpitations, dizziness  GASTROINTESTINAL: No abdominal pain. No nausea, vomiting, diarrhea  GENITOURINARY: No dysuria, increase frequency, hematuria, or incontinence  NEUROLOGICAL: No headaches, memory loss, loss of strength, numbness, or tremors, no weakness  EXTREMITY: No pedal edema BLE  SKIN: No itching, burning, rashes, or lesions     VITAL SIGNS:  T(C): 36.9 (08-01-21 @ 11:01), Max: 37.4 (08-01-21 @ 05:18)  T(F): 98.5 (08-01-21 @ 11:01), Max: 99.4 (08-01-21 @ 05:18)  HR: 100 (08-01-21 @ 11:01) (91 - 101)  BP: 124/76 (08-01-21 @ 11:01) (119/69 - 124/76)  RR: 17 (08-01-21 @ 11:01) (17 - 18)  SpO2: 95% (08-01-21 @ 11:01) (92% - 96%)  Wt(kg): --    PHYSICAL EXAM:    GENERAL: not in any distress  HEENT: Neck is supple, normocephalic, atraumatic   CHEST/LUNG: Clear to auscultation bilaterally; No rales, rhonchi, wheezing  HEART: Regular rate and rhythm; No murmurs, rubs, or gallops  ABDOMEN: Soft, Nontender, Nondistended; Bowel sounds present, no rebound   EXTREMITIES:  2+ Peripheral Pulses, No clubbing, cyanosis, or edema  SKIN: No rashes or lesions  BACK: no pressor sore   NERVOUS SYSTEM:  Alert & Oriented X3, Good concentration  PSYCH: normal affect     LABS:                               Vancomycin Level, Trough: 11.3 ug/mL (08-01 @ 08:10)        Culture Results:   No growth (07-30 @ 18:53)  Culture Results:   No growth to date. (07-30 @ 13:15)  Culture Results:   Culture is being performed. (07-30 @ 12:46)  Culture Results:   No growth to date. (07-30 @ 12:05)  Culture Results:   Culture is being performed. (07-29 @ 23:21)  Culture Results:   Culture is being performed. (07-28 @ 18:08)  Culture Results:   No growth (07-28 @ 14:38)  Culture Results:   No Blood Parasites observed by giemsa stain  One negative set of blood smears does not rule out  the possibility of a parasitic infection.  A minimum of 3  specimens should be collected, at least 12-24 hours apart,  over a 36 hour time period. (07-28 @ 09:00)  Culture Results:   No Blood Parasites observed by giemsa stain  One negative set of blood smears does not rule out  the possibility of a parasitic infection.  A minimum of 3  specimens should be collected, at least 12-24 hours apart,  over a 36 hour time period. (07-27 @ 12:39)  Culture Results:   Culture is being performed. (07-26 @ 17:53)  Culture Results:   No Blood Parasites observed by giemsa stain  One negative set of blood smears does not rule out  the possibility of a parasitic infection.  A minimum of 3  specimens should be collected, at least 12-24 hours apart,  over a 36 hour time period. (07-26 @ 16:11)  Culture Results:   No growth (07-26 @ 09:08)  Culture Results:   No Growth Final (07-26 @ 00:17)  Culture Results:   No Growth Final (07-26 @ 00:17)                Radiology:

## 2021-08-01 NOTE — PROGRESS NOTE ADULT - ASSESSMENT
Patient is a 61M with a PMH of latent TB?, DM, HLD who presents to the ED for fever and dyspnea.       Afebrile overnight. CT reveiwed. Follow up with ID regarding antibiotics for discharge. Family updated.    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 Riddle, outpatient because of an insurance issue.  New right sided pleural effusion on CT  s/p  US thoracentesis 8/28  AFB sputum negative x3  f/u Pulm and ID  Antibiotics per ID  No isolation needed per ID  Incentive spirometry    Hyponatremia, likely chronic  Patient is euvolemic and symptomatic  Fluid restriction    Type 2 diabetes mellitus without complication, without long-term current use of insulin.   insulin corrective scale.     Dyslipidemia  Lipitor     Microcytic anemia  Stable    DVT prop: Lovenox 40 daily.

## 2021-08-02 LAB
ANION GAP SERPL CALC-SCNC: 7 MMOL/L — SIGNIFICANT CHANGE UP (ref 5–17)
BUN SERPL-MCNC: 14 MG/DL — SIGNIFICANT CHANGE UP (ref 7–23)
CALCIUM SERPL-MCNC: 9 MG/DL — SIGNIFICANT CHANGE UP (ref 8.5–10.1)
CHLORIDE SERPL-SCNC: 102 MMOL/L — SIGNIFICANT CHANGE UP (ref 96–108)
CO2 SERPL-SCNC: 26 MMOL/L — SIGNIFICANT CHANGE UP (ref 22–31)
CREAT SERPL-MCNC: 0.74 MG/DL — SIGNIFICANT CHANGE UP (ref 0.5–1.3)
CULTURE RESULTS: SIGNIFICANT CHANGE UP
GLUCOSE BLDC GLUCOMTR-MCNC: 107 MG/DL — HIGH (ref 70–99)
GLUCOSE BLDC GLUCOMTR-MCNC: 129 MG/DL — HIGH (ref 70–99)
GLUCOSE BLDC GLUCOMTR-MCNC: 133 MG/DL — HIGH (ref 70–99)
GLUCOSE BLDC GLUCOMTR-MCNC: 189 MG/DL — HIGH (ref 70–99)
GLUCOSE SERPL-MCNC: 120 MG/DL — HIGH (ref 70–99)
GRAM STN FLD: SIGNIFICANT CHANGE UP
HCT VFR BLD CALC: 32.1 % — LOW (ref 39–50)
HGB BLD-MCNC: 11.1 G/DL — LOW (ref 13–17)
MCHC RBC-ENTMCNC: 23.4 PG — LOW (ref 27–34)
MCHC RBC-ENTMCNC: 34.6 GM/DL — SIGNIFICANT CHANGE UP (ref 32–36)
MCV RBC AUTO: 67.6 FL — LOW (ref 80–100)
NRBC # BLD: 0 /100 WBCS — SIGNIFICANT CHANGE UP (ref 0–0)
PLATELET # BLD AUTO: 900 K/UL — HIGH (ref 150–400)
POTASSIUM SERPL-MCNC: 4.2 MMOL/L — SIGNIFICANT CHANGE UP (ref 3.5–5.3)
POTASSIUM SERPL-SCNC: 4.2 MMOL/L — SIGNIFICANT CHANGE UP (ref 3.5–5.3)
RBC # BLD: 4.75 M/UL — SIGNIFICANT CHANGE UP (ref 4.2–5.8)
RBC # FLD: 18 % — HIGH (ref 10.3–14.5)
SODIUM SERPL-SCNC: 135 MMOL/L — SIGNIFICANT CHANGE UP (ref 135–145)
SPECIMEN SOURCE: SIGNIFICANT CHANGE UP
WBC # BLD: 8.49 K/UL — SIGNIFICANT CHANGE UP (ref 3.8–10.5)
WBC # FLD AUTO: 8.49 K/UL — SIGNIFICANT CHANGE UP (ref 3.8–10.5)

## 2021-08-02 PROCEDURE — 99233 SBSQ HOSP IP/OBS HIGH 50: CPT

## 2021-08-02 RX ORDER — BENZOCAINE AND MENTHOL 5; 1 G/100ML; G/100ML
1 LIQUID ORAL
Refills: 0 | Status: DISCONTINUED | OUTPATIENT
Start: 2021-08-02 | End: 2021-08-03

## 2021-08-02 RX ADMIN — Medication 200 MILLIGRAM(S): at 19:03

## 2021-08-02 RX ADMIN — Medication 200 MILLIGRAM(S): at 21:26

## 2021-08-02 RX ADMIN — MEROPENEM 100 MILLIGRAM(S): 1 INJECTION INTRAVENOUS at 21:26

## 2021-08-02 RX ADMIN — MEROPENEM 100 MILLIGRAM(S): 1 INJECTION INTRAVENOUS at 05:08

## 2021-08-02 RX ADMIN — Medication 81 MILLIGRAM(S): at 11:04

## 2021-08-02 RX ADMIN — MEROPENEM 100 MILLIGRAM(S): 1 INJECTION INTRAVENOUS at 14:29

## 2021-08-02 RX ADMIN — AZITHROMYCIN 500 MILLIGRAM(S): 500 TABLET, FILM COATED ORAL at 11:04

## 2021-08-02 RX ADMIN — ENOXAPARIN SODIUM 40 MILLIGRAM(S): 100 INJECTION SUBCUTANEOUS at 11:04

## 2021-08-02 RX ADMIN — ATORVASTATIN CALCIUM 20 MILLIGRAM(S): 80 TABLET, FILM COATED ORAL at 21:26

## 2021-08-02 RX ADMIN — BENZOCAINE AND MENTHOL 1 LOZENGE: 5; 1 LIQUID ORAL at 21:25

## 2021-08-02 RX ADMIN — Medication 250 MILLIGRAM(S): at 09:56

## 2021-08-02 RX ADMIN — Medication 1: at 11:04

## 2021-08-02 RX ADMIN — Medication 250 MILLIGRAM(S): at 21:25

## 2021-08-02 NOTE — PROGRESS NOTE ADULT - NUTRITIONAL ASSESSMENT
This patient has been assessed with a concern for Malnutrition and has been determined to have a diagnosis/diagnoses of Severe protein-calorie malnutrition and Underweight (BMI < 19).    This patient is being managed with:   Diet Consistent Carbohydrate w/Evening Snack-  800mL Fluid Restriction ([id number])  Supplement Feeding Modality:  Oral  Glucerna Shake Cans or Servings Per Day:  1       Frequency:  Three Times a day  Entered: Jul 27 2021  1:51PM    
This patient has been assessed with a concern for Malnutrition and has been determined to have a diagnosis/diagnoses of Severe protein-calorie malnutrition and Underweight (BMI < 19).    This patient is being managed with:   Diet Consistent Carbohydrate w/Evening Snack-  800mL Fluid Restriction ([id number])  Supplement Feeding Modality:  Oral  Glucerna Shake Cans or Servings Per Day:  1       Frequency:  Three Times a day  Entered: Jul 27 2021  1:51PM    
This patient has been assessed with a concern for Malnutrition and has been determined to have a diagnosis/diagnoses of Severe protein-calorie malnutrition and Underweight (BMI < 19).    This patient is being managed with:   Diet Consistent Carbohydrate w/Evening Snack-  800mL Fluid Restriction ([id number])  Supplement Feeding Modality:  Oral  Glucerna Shake Cans or Servings Per Day:  1       Frequency:  Three Times a day  Entered: Jul 27 2021  1:51PM    
This patient has been assessed with a concern for Malnutrition and has been determined to have a diagnosis/diagnoses of Severe protein-calorie malnutrition and Underweight (BMI < 19).    This patient is being managed with:   Diet Consistent Carbohydrate w/Evening Snack-  800mL Fluid Restriction ([id number])  Supplement Feeding Modality:  Oral  Glucerna Shake Cans or Servings Per Day:  1       Frequency:  Three Times a day  Entered: Jul 27 2021  1:51PM    
This patient has been assessed with a concern for Malnutrition and has been determined to have a diagnosis/diagnoses of Severe protein-calorie malnutrition and Underweight (BMI < 19).    This patient is being managed with:   Diet Consistent Carbohydrate w/Evening Snack-  800mL Fluid Restriction ([id number])  Supplement Feeding Modality:  Oral  Glucerna Shake Cans or Servings Per Day:  1       Frequency:  Three Times a day  Entered: Jul 27 2021  1:51PM    
This patient has been assessed with a concern for Malnutrition and has been determined to have a diagnosis/diagnoses of Severe protein-calorie malnutrition and Underweight (BMI < 19).    This patient is being managed with:   Diet Consistent Carbohydrate w/Evening Snack-  800mL Fluid Restriction ([id number])  Supplement Feeding Modality:  Oral  Glucerna Shake Cans or Servings Per Day:  1       Frequency:  Three Times a day  Entered: Jul 27 2021  1:51PM    
This patient has been assessed with a concern for Malnutrition and has been determined to have a diagnosis/diagnoses of Severe protein-calorie malnutrition and Underweight (BMI < 19).    This patient is being managed with:   Diet Consistent Carbohydrate w/Evening Snack-  800mL Fluid Restriction ([id number])  Supplement Feeding Modality:  Oral  Glucerna Shake Cans or Servings Per Day:  1       Frequency:  Three Times a day  Entered: Jul 27 2021  1:51PM    
This patient has been assessed with a concern for Malnutrition and has been determined to have a diagnosis/diagnoses of Severe protein-calorie malnutrition and Underweight (BMI < 19).    This patient is being managed with:   Diet Consistent Carbohydrate w/Evening Snack-  Supplement Feeding Modality:  Oral  Glucerna Shake Cans or Servings Per Day:  1       Frequency:  Three Times a day  Entered: Jul 27 2021 11:59AM    Diet Consistent Carbohydrate w/Evening Snack-  Entered: Jul 25 2021  9:49PM    The following pending diet order is being considered for treatment of Severe protein-calorie malnutrition and Underweight (BMI < 19):null

## 2021-08-02 NOTE — PROGRESS NOTE ADULT - SUBJECTIVE AND OBJECTIVE BOX
INTERVAL HPI:  nterviewed with help of Language line solution( Danny # [id number]).  61M with a PMH of latent TB?, DM, HLD who presents to the ED for non productive cough, fever for two weeks and exertional dyspnea for one week.  Saw physician, given Levaquin 1st then changed to Augmentin which he stopped due to diarrhea.  In ED with tachycardia, tachypnea and temperature of 102.1.  CTA showed large loculated right sided pleural effusion with volume loss.    Was admitted here in July 2020 with cough, fever, poor appetite and weight loss. At that time found to have hyperglycemia, RUL lung opacities.   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  07/24/20: Had bronchoscopy with BAL and biopsy( showed chronic inflammation, fibrosis).  Sedrate less than 5 x 2.    HIV negative.  ACE level normal.  DANIELLE, C-ANCA, P- ANCA  negative.  Hepatitis negative.  Suspicious for MTB was low, got discharged,  and claims that had a tele visit with DR. Riddle, no meds started.  OVERNIGHT EVENTS:  Comfortable    Vital Signs Last 24 Hrs  T(C): 37.1 (02 Aug 2021 16:54), Max: 37.6 (02 Aug 2021 05:44)  T(F): 98.7 (02 Aug 2021 16:54), Max: 99.7 (02 Aug 2021 05:44)  HR: 101 (02 Aug 2021 16:54) (93 - 103)  BP: 105/64 (02 Aug 2021 16:54) (100/59 - 105/64)  BP(mean): --  RR: 18 (02 Aug 2021 16:54) (17 - 18)  SpO2: 97% (02 Aug 2021 16:54) (94% - 97%)    PHYSICAL EXAM:  GEN:         Awake, responsive and comfortable.  HEENT:    Normal.    RESP:       no distress  CVS:          Regular rate and rhythm.   ABD:         Soft, non-tender, non-distended;     MEDICATIONS  (STANDING):  aspirin enteric coated 81 milliGRAM(s) Oral daily  atorvastatin 20 milliGRAM(s) Oral at bedtime  azithromycin   Tablet 500 milliGRAM(s) Oral daily  benzocaine 15 mG/menthol 3.6 mG (Sugar-Free) Lozenge 1 Lozenge Oral five times a day  dextrose 40% Gel 15 Gram(s) Oral once  dextrose 5%. 1000 milliLiter(s) (50 mL/Hr) IV Continuous <Continuous>  dextrose 5%. 1000 milliLiter(s) (100 mL/Hr) IV Continuous <Continuous>  dextrose 50% Injectable 25 Gram(s) IV Push once  dextrose 50% Injectable 12.5 Gram(s) IV Push once  dextrose 50% Injectable 25 Gram(s) IV Push once  enoxaparin Injectable 40 milliGRAM(s) SubCutaneous daily  glucagon  Injectable 1 milliGRAM(s) IntraMuscular once  guaiFENesin Oral Liquid (Sugar-Free) 200 milliGRAM(s) Oral every 6 hours  insulin lispro (ADMELOG) corrective regimen sliding scale   SubCutaneous three times a day before meals  meropenem  IVPB 1000 milliGRAM(s) IV Intermittent every 8 hours  vancomycin  IVPB 750 milliGRAM(s) IV Intermittent every 12 hours    MEDICATIONS  (PRN):  acetaminophen   Tablet .. 650 milliGRAM(s) Oral every 6 hours PRN Temp greater or equal to 38C (100.4F)  guaiFENesin Oral Liquid (Sugar-Free) 200 milliGRAM(s) Oral every 6 hours PRN Cough    LABS:                        11.1   8.49  )-----------( 900      ( 02 Aug 2021 08:11 )             32.1     08-02    135  |  102  |  14  ----------------------------<  120<H>  4.2   |  26  |  0.74    Ca    9.0      02 Aug 2021 08:11     ASSESSMENT AND PLAN:  ·	SOB.  ·	Large loculated right pleural effusion.  ·	Anemia.  ·	Hyponatremia.  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  ·	HLD    Clinically better with antibiotics. POst thoracentesis CT chest does not show much of the disease to indicate progression of Mycobacterium Abscessus,  Discussed with Venkatesh KRAMER, no need to treat above at this point.  Completed antibiotics for pneumonia per ID.  Follow pleural fluid cultures.

## 2021-08-02 NOTE — PROGRESS NOTE ADULT - ASSESSMENT
Patient is a 61M with a PMH of latent TB?, DM, HLD who presents to the ED for fever and dyspnea.        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 Riddle, outpatient because of an insurance issue.  New right sided pleural effusion on CT  s/p  US thoracentesis 8/28  AFB sputum negative x3  f/u Pulm and ID recs  Antibiotics per ID  Incentive spirometry    Hyponatremia, likely chronic  Patient is euvolemic and symptomatic  Fluid restriction    Type 2 diabetes mellitus without complication, without long-term current use of insulin.   insulin corrective scale.     Dyslipidemia  Lipitor     Microcytic anemia  Stable    DVT prop: Lovenox 40 daily.     Updated daughter Lois today- [phone]

## 2021-08-02 NOTE — PROGRESS NOTE ADULT - SUBJECTIVE AND OBJECTIVE BOX
Patient is a 61y old  Male who presents with a chief complaint of dyspnea (01 Aug 2021 22:35)      INTERVAL HPI/OVERNIGHT EVENTS: Still coughing, but otherwise feels much better.     MEDICATIONS  (STANDING):  aspirin enteric coated 81 milliGRAM(s) Oral daily  atorvastatin 20 milliGRAM(s) Oral at bedtime  azithromycin   Tablet 500 milliGRAM(s) Oral daily  benzocaine 15 mG/menthol 3.6 mG (Sugar-Free) Lozenge 1 Lozenge Oral five times a day  dextrose 40% Gel 15 Gram(s) Oral once  dextrose 5%. 1000 milliLiter(s) (50 mL/Hr) IV Continuous <Continuous>  dextrose 5%. 1000 milliLiter(s) (100 mL/Hr) IV Continuous <Continuous>  dextrose 50% Injectable 25 Gram(s) IV Push once  dextrose 50% Injectable 12.5 Gram(s) IV Push once  dextrose 50% Injectable 25 Gram(s) IV Push once  enoxaparin Injectable 40 milliGRAM(s) SubCutaneous daily  glucagon  Injectable 1 milliGRAM(s) IntraMuscular once  guaiFENesin Oral Liquid (Sugar-Free) 200 milliGRAM(s) Oral every 6 hours  insulin lispro (ADMELOG) corrective regimen sliding scale   SubCutaneous three times a day before meals  meropenem  IVPB 1000 milliGRAM(s) IV Intermittent every 8 hours  vancomycin  IVPB 750 milliGRAM(s) IV Intermittent every 12 hours    MEDICATIONS  (PRN):  acetaminophen   Tablet .. 650 milliGRAM(s) Oral every 6 hours PRN Temp greater or equal to 38C (100.4F)  guaiFENesin Oral Liquid (Sugar-Free) 200 milliGRAM(s) Oral every 6 hours PRN Cough      Allergies    No Known Allergies    Intolerances        REVIEW OF SYSTEMS:  CONSTITUTIONAL: No fever, weight loss, or fatigue  EYES: No eye pain, visual disturbances, or discharge  ENMT:  No difficulty hearing, tinnitus, vertigo; No sinus or throat pain  NECK: No pain or stiffness  BREASTS: No pain, masses, or nipple discharge  RESPIRATORY: No cough, wheezing, chills or hemoptysis; No shortness of breath  CARDIOVASCULAR: No chest pain, palpitations, dizziness, or leg swelling  GASTROINTESTINAL: No abdominal or epigastric pain. No nausea, vomiting, or hematemesis; No diarrhea or constipation. No melena or hematochezia.  GENITOURINARY: No dysuria, frequency, hematuria, or incontinence  NEUROLOGICAL: No headaches, memory loss, loss of strength, numbness, or tremors  SKIN: No itching, burning, rashes, or lesions   LYMPH NODES: No enlarged glands  ENDOCRINE: No heat or cold intolerance; No hair loss  MUSCULOSKELETAL: No joint pain or swelling; No muscle, back, or extremity pain  PSYCHIATRIC: No depression, anxiety, mood swings, or difficulty sleeping  HEME/LYMPH: No easy bruising, or bleeding gums  ALLERGY AND IMMUNOLOGIC: No hives or eczema    Vital Signs Last 24 Hrs  T(C): 37.1 (02 Aug 2021 16:54), Max: 37.6 (02 Aug 2021 05:44)  T(F): 98.7 (02 Aug 2021 16:54), Max: 99.7 (02 Aug 2021 05:44)  HR: 101 (02 Aug 2021 16:54) (93 - 103)  BP: 105/64 (02 Aug 2021 16:54) (100/59 - 105/64)  BP(mean): --  RR: 18 (02 Aug 2021 16:54) (17 - 18)  SpO2: 97% (02 Aug 2021 16:54) (94% - 97%)    PHYSICAL EXAM:  GENERAL: NAD, well-groomed, well-developed  HEAD:  Atraumatic, Normocephalic  EYES: EOMI, PERRLA, conjunctiva and sclera clear  ENMT: No tonsillar erythema, exudates, or enlargement; Moist mucous membranes, Good dentition, No lesions  NECK: Supple, No JVD, Normal thyroid  NERVOUS SYSTEM:  Alert & Oriented X3, Good concentration; Motor Strength 5/5 B/L upper and lower extremities; DTRs 2+ intact and symmetric  CHEST/LUNG: Clear to percussion bilaterally; No rales, rhonchi, wheezing, or rubs  HEART: Regular rate and rhythm; No murmurs, rubs, or gallops  ABDOMEN: Soft, Nontender, Nondistended; Bowel sounds present  EXTREMITIES:  2+ Peripheral Pulses, No clubbing, cyanosis, or edema  LYMPH: No lymphadenopathy noted  SKIN: No rashes or lesions    LABS:                        11.1   8.49  )-----------( 900      ( 02 Aug 2021 08:11 )             32.1     08-02    135  |  102  |  14  ----------------------------<  120<H>  4.2   |  26  |  0.74    Ca    9.0      02 Aug 2021 08:11          CAPILLARY BLOOD GLUCOSE      POCT Blood Glucose.: 133 mg/dL (02 Aug 2021 15:41)  POCT Blood Glucose.: 189 mg/dL (02 Aug 2021 10:43)  POCT Blood Glucose.: 129 mg/dL (02 Aug 2021 07:18)  POCT Blood Glucose.: 153 mg/dL (01 Aug 2021 20:51)      RADIOLOGY & ADDITIONAL TESTS:    Imaging Personally Reviewed:  [ ] YES  [ ] NO    Consultant(s) Notes Reviewed:  [ ] YES  [ ] NO    Care Discussed with Consultants/Other Providers [ ] YES  [ ] NO

## 2021-08-03 ENCOUNTER — TRANSCRIPTION ENCOUNTER (OUTPATIENT)
Age: 61
End: 2021-08-03

## 2021-08-03 VITALS
TEMPERATURE: 98 F | OXYGEN SATURATION: 95 % | HEART RATE: 100 BPM | RESPIRATION RATE: 18 BRPM | SYSTOLIC BLOOD PRESSURE: 125 MMHG | DIASTOLIC BLOOD PRESSURE: 74 MMHG

## 2021-08-03 LAB
ANION GAP SERPL CALC-SCNC: 8 MMOL/L — SIGNIFICANT CHANGE UP (ref 5–17)
BUN SERPL-MCNC: 13 MG/DL — SIGNIFICANT CHANGE UP (ref 7–23)
CALCIUM SERPL-MCNC: 9.2 MG/DL — SIGNIFICANT CHANGE UP (ref 8.5–10.1)
CHLORIDE SERPL-SCNC: 102 MMOL/L — SIGNIFICANT CHANGE UP (ref 96–108)
CO2 SERPL-SCNC: 26 MMOL/L — SIGNIFICANT CHANGE UP (ref 22–31)
CREAT SERPL-MCNC: 0.72 MG/DL — SIGNIFICANT CHANGE UP (ref 0.5–1.3)
GLUCOSE BLDC GLUCOMTR-MCNC: 129 MG/DL — HIGH (ref 70–99)
GLUCOSE BLDC GLUCOMTR-MCNC: 140 MG/DL — HIGH (ref 70–99)
GLUCOSE BLDC GLUCOMTR-MCNC: 211 MG/DL — HIGH (ref 70–99)
GLUCOSE SERPL-MCNC: 124 MG/DL — HIGH (ref 70–99)
HCT VFR BLD CALC: 30.7 % — LOW (ref 39–50)
HGB BLD-MCNC: 10.6 G/DL — LOW (ref 13–17)
MAGNESIUM SERPL-MCNC: 2.5 MG/DL — SIGNIFICANT CHANGE UP (ref 1.6–2.6)
MCHC RBC-ENTMCNC: 22.9 PG — LOW (ref 27–34)
MCHC RBC-ENTMCNC: 34.5 GM/DL — SIGNIFICANT CHANGE UP (ref 32–36)
MCV RBC AUTO: 66.5 FL — LOW (ref 80–100)
NRBC # BLD: 0 /100 WBCS — SIGNIFICANT CHANGE UP (ref 0–0)
PLATELET # BLD AUTO: 893 K/UL — HIGH (ref 150–400)
POTASSIUM SERPL-MCNC: 4.1 MMOL/L — SIGNIFICANT CHANGE UP (ref 3.5–5.3)
POTASSIUM SERPL-SCNC: 4.1 MMOL/L — SIGNIFICANT CHANGE UP (ref 3.5–5.3)
RBC # BLD: 4.62 M/UL — SIGNIFICANT CHANGE UP (ref 4.2–5.8)
RBC # FLD: 18.1 % — HIGH (ref 10.3–14.5)
SODIUM SERPL-SCNC: 136 MMOL/L — SIGNIFICANT CHANGE UP (ref 135–145)
VANCOMYCIN TROUGH SERPL-MCNC: 16.2 UG/ML — SIGNIFICANT CHANGE UP (ref 10–20)
WBC # BLD: 8.46 K/UL — SIGNIFICANT CHANGE UP (ref 3.8–10.5)
WBC # FLD AUTO: 8.46 K/UL — SIGNIFICANT CHANGE UP (ref 3.8–10.5)

## 2021-08-03 PROCEDURE — 99239 HOSP IP/OBS DSCHRG MGMT >30: CPT

## 2021-08-03 RX ORDER — BENZOCAINE AND MENTHOL 5; 1 G/100ML; G/100ML
1 LIQUID ORAL
Qty: 90 | Refills: 0
Start: 2021-08-03 | End: 2021-09-01

## 2021-08-03 RX ADMIN — BENZOCAINE AND MENTHOL 1 LOZENGE: 5; 1 LIQUID ORAL at 07:51

## 2021-08-03 RX ADMIN — Medication 250 MILLIGRAM(S): at 09:10

## 2021-08-03 RX ADMIN — Medication 2: at 11:17

## 2021-08-03 RX ADMIN — Medication 81 MILLIGRAM(S): at 11:31

## 2021-08-03 RX ADMIN — BENZOCAINE AND MENTHOL 1 LOZENGE: 5; 1 LIQUID ORAL at 16:20

## 2021-08-03 RX ADMIN — AZITHROMYCIN 500 MILLIGRAM(S): 500 TABLET, FILM COATED ORAL at 11:31

## 2021-08-03 RX ADMIN — Medication 200 MILLIGRAM(S): at 17:08

## 2021-08-03 RX ADMIN — ENOXAPARIN SODIUM 40 MILLIGRAM(S): 100 INJECTION SUBCUTANEOUS at 11:31

## 2021-08-03 RX ADMIN — Medication 200 MILLIGRAM(S): at 05:28

## 2021-08-03 RX ADMIN — MEROPENEM 100 MILLIGRAM(S): 1 INJECTION INTRAVENOUS at 05:28

## 2021-08-03 RX ADMIN — Medication 200 MILLIGRAM(S): at 11:31

## 2021-08-03 NOTE — PROGRESS NOTE ADULT - NSICDXPILOT_GEN_ALL_CORE
[place]

## 2021-08-03 NOTE — PROGRESS NOTE ADULT - SUBJECTIVE AND OBJECTIVE BOX
INTERVAL HPI:  On admission Interviewed with help of Language line solution( Danny # [id number]).  61M with a PMH of latent TB?, DM, HLD who presents to the ED for non productive cough, fever for two weeks and exertional dyspnea for one week.  Saw physician, given Levaquin 1st then changed to Augmentin which he stopped due to diarrhea.  In ED with tachycardia, tachypnea and temperature of 102.1.  CTA showed large loculated right sided pleural effusion with volume loss.    Was admitted here in July 2020 with cough, fever, poor appetite and weight loss. At that time found to have hyperglycemia, RUL lung opacities.   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  07/24/20: Had bronchoscopy with BAL and biopsy( showed chronic inflammation, fibrosis).  Sedrate less than 5 x 2.    HIV negative.  ACE level normal.  DANIELLE, C-ANCA, P- ANCA  negative.  Hepatitis negative.  Suspicious for MTB was low, got discharged,  and claims that had a tele visit with DR. Riddle, no meds started.    OVERNIGHT EVENTS:  Awake, comfortable    Vital Signs Last 24 Hrs  T(C): 36.9 (03 Aug 2021 17:29), Max: 37.5 (02 Aug 2021 23:39)  T(F): 98.4 (03 Aug 2021 17:29), Max: 99.5 (02 Aug 2021 23:39)  HR: 100 (03 Aug 2021 17:29) (100 - 102)  BP: 125/74 (03 Aug 2021 17:29) (100/63 - 136/81)  BP(mean): --  RR: 18 (03 Aug 2021 17:29) (16 - 18)  SpO2: 95% (03 Aug 2021 17:29) (95% - 96%)    PHYSICAL EXAM:  GEN:         Awake, responsive and comfortable.  HEENT:    Normal.    RESP:       no wheezing  CVS:          Regular rate and rhythm.   ABD:         Soft, non-tender, non-distended;     MEDICATIONS  (STANDING):  aspirin enteric coated 81 milliGRAM(s) Oral daily  atorvastatin 20 milliGRAM(s) Oral at bedtime  azithromycin   Tablet 500 milliGRAM(s) Oral daily  benzocaine 15 mG/menthol 3.6 mG (Sugar-Free) Lozenge 1 Lozenge Oral five times a day  dextrose 40% Gel 15 Gram(s) Oral once  dextrose 5%. 1000 milliLiter(s) (50 mL/Hr) IV Continuous <Continuous>  dextrose 5%. 1000 milliLiter(s) (100 mL/Hr) IV Continuous <Continuous>  dextrose 50% Injectable 25 Gram(s) IV Push once  dextrose 50% Injectable 12.5 Gram(s) IV Push once  dextrose 50% Injectable 25 Gram(s) IV Push once  enoxaparin Injectable 40 milliGRAM(s) SubCutaneous daily  glucagon  Injectable 1 milliGRAM(s) IntraMuscular once  guaiFENesin Oral Liquid (Sugar-Free) 200 milliGRAM(s) Oral every 6 hours  insulin lispro (ADMELOG) corrective regimen sliding scale   SubCutaneous three times a day before meals  vancomycin  IVPB 750 milliGRAM(s) IV Intermittent every 12 hours    MEDICATIONS  (PRN):  acetaminophen   Tablet .. 650 milliGRAM(s) Oral every 6 hours PRN Temp greater or equal to 38C (100.4F)  guaiFENesin Oral Liquid (Sugar-Free) 200 milliGRAM(s) Oral every 6 hours PRN Cough    LABS:                        10.6   8.46  )-----------( 893      ( 03 Aug 2021 07:15 )             30.7     08-03    136  |  102  |  13  ----------------------------<  124<H>  4.1   |  26  |  0.72    Ca    9.2      03 Aug 2021 07:15  Mg     2.5     08-03     ASSESSMENT AND PLAN:  ·	SOB.  ·	Large loculated right pleural effusion.  ·	Anemia.  ·	Hyponatremia.  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  ·	HLD    Remains afebrile.  Clinically improved  with antibiotics. POst thoracentesis CT chest does not show much of the disease to indicate progression of Mycobacterium Abscessus,  Discussed with Venkatesh KRAMER, no need to treat above at this point.  Completed antibiotics for pneumonia per ID.  Follow pleural fluid cultures.  Oxygenation has been stable.

## 2021-08-03 NOTE — DISCHARGE NOTE PROVIDER - DETAILS OF MALNUTRITION DIAGNOSIS/DIAGNOSES
This patient has been assessed with a concern for Malnutrition and was treated during this hospitalization for the following Nutrition diagnosis/diagnoses:     -  07/27/2021: Severe protein-calorie malnutrition   -  07/27/2021: Underweight (BMI < 19)

## 2021-08-03 NOTE — DISCHARGE NOTE PROVIDER - HOSPITAL COURSE
Patient is a 61M with a PMH of latent TB?, DM, HLD who presents to the ED for fever and dyspnea.        Pneumonia of right lower lobe due to infectious organism with new right sided pleural effusion; likely exudative fluid from Hx of non tuberculosis mycobacteria   Pt was ruled out for Tuberculosis -AFB sputum negative x3  s/p thoracentesis for rt large effusion-s/p  US thoracentesis 8/28  Mycobacterium abscess   Discussed with Pulm and ID -pt does not need anymore antibiotics, Post-thoracentesis CT scan-with improvement, pt stable for discharge. Needs to follow outpatient for pending pleural fluid culture,  and continuation of management    .   Hyponatremia, likely chronic  Patient is euvolemic and symptomatic  Fluid restriction    Type 2 diabetes mellitus without complication, without long-term current use of insulin.   insulin corrective scale.     Dyslipidemia  Lipitor     Microcytic anemia  Stable    Spoke with Daughter-Lois- who translated everything at discharge, pt and daughter raised understanding.

## 2021-08-03 NOTE — DISCHARGE NOTE PROVIDER - PROVIDER TOKENS
PROVIDER:[TOKEN:[[id number]],FOLLOWUP:[1 week]],PROVIDER:[TOKEN:[[id number]],FOLLOWUP:[1 week]]

## 2021-08-03 NOTE — PROGRESS NOTE ADULT - REASON FOR ADMISSION
dyspnea

## 2021-08-03 NOTE — PROGRESS NOTE ADULT - PROVIDER SPECIALTY LIST ADULT
Hospitalist
Infectious Disease
Hospitalist
Hospitalist
Pulmonology
Hospitalist
Infectious Disease
Pulmonology
Pulmonology
Hospitalist
Hospitalist
Pulmonology
Hospitalist
Hospitalist
Infectious Disease
Intervent Radiology

## 2021-08-03 NOTE — DISCHARGE NOTE PROVIDER - CARE PROVIDER_API CALL
Asha Riddle  INFECTIOUS DISEASE  [address] [place] [address]  Phone: [phone]  Fax: [phone]  Follow Up Time: 1 week    Jenise Daniel)  Medicine  [address] [facility], [facility] [address]  [place] [address]  Phone: [phone]  Fax: [phone]  Follow Up Time: 1 week

## 2021-08-04 LAB
CULTURE RESULTS: SIGNIFICANT CHANGE UP
CULTURE RESULTS: SIGNIFICANT CHANGE UP
SPECIMEN SOURCE: SIGNIFICANT CHANGE UP
SPECIMEN SOURCE: SIGNIFICANT CHANGE UP

## 2021-08-05 DIAGNOSIS — E78.5 HYPERLIPIDEMIA, UNSPECIFIED: ICD-10-CM

## 2021-08-05 DIAGNOSIS — E87.1 HYPO-OSMOLALITY AND HYPONATREMIA: ICD-10-CM

## 2021-08-05 DIAGNOSIS — J18.9 PNEUMONIA, UNSPECIFIED ORGANISM: ICD-10-CM

## 2021-08-05 DIAGNOSIS — Z86.11 PERSONAL HISTORY OF TUBERCULOSIS: ICD-10-CM

## 2021-08-05 DIAGNOSIS — E11.65 TYPE 2 DIABETES MELLITUS WITH HYPERGLYCEMIA: ICD-10-CM

## 2021-08-05 DIAGNOSIS — J90 PLEURAL EFFUSION, NOT ELSEWHERE CLASSIFIED: ICD-10-CM

## 2021-08-05 DIAGNOSIS — E43 UNSPECIFIED SEVERE PROTEIN-CALORIE MALNUTRITION: ICD-10-CM

## 2021-08-05 DIAGNOSIS — J98.11 ATELECTASIS: ICD-10-CM

## 2021-08-10 DIAGNOSIS — Z01.812 ENCOUNTER FOR PREPROCEDURAL LABORATORY EXAMINATION: ICD-10-CM

## 2021-08-10 PROBLEM — Z00.00 ENCOUNTER FOR PREVENTIVE HEALTH EXAMINATION: Status: ACTIVE | Noted: 2021-08-10

## 2021-08-10 PROBLEM — E11.9 TYPE 2 DIABETES MELLITUS WITHOUT COMPLICATIONS: Chronic | Status: ACTIVE | Noted: 2021-07-25

## 2021-08-21 NOTE — CHART NOTE - NSCHARTNOTEFT_GEN_A_CORE
Informed by Patricia from lab regarding blood cultures from July admission growing acid fast bacilli. Called pt's number on file [phone], no answer, left message to call back hospital     Chandrakant Devlin MD

## 2021-08-22 NOTE — CHART NOTE - NSCHARTNOTEFT_GEN_A_CORE
Hospitalist On Call  Contacted by pt's daughter Lois [phone].  Pt is asymptomatic without SOB / fever / chills / cough.  July 26th Sputum Cx noted to be growing acid fast bacilli.  Per chart, there was low suspicion at that time for TB, but concern for ARELY.  Advised daughter to arrange for pt to see Dr. Riddle for followup as soon as possible.  And return to ED if he experiences any recurrence or worsening of symptoms.  She acknowledges understanding.  Discussed with Dr. Riddle.

## 2021-08-23 ENCOUNTER — APPOINTMENT (OUTPATIENT)
Dept: PULMONOLOGY | Facility: CLINIC | Age: 61
End: 2021-08-23

## 2022-03-04 ENCOUNTER — APPOINTMENT (OUTPATIENT)
Dept: ORTHOPEDIC SURGERY | Facility: CLINIC | Age: 62
End: 2022-03-04

## 2022-04-22 DIAGNOSIS — Z78.9 OTHER SPECIFIED HEALTH STATUS: ICD-10-CM

## 2022-04-22 DIAGNOSIS — Z86.11 PERSONAL HISTORY OF TUBERCULOSIS: ICD-10-CM

## 2022-04-22 RX ORDER — RIFAMPIN 300 MG/1
CAPSULE ORAL
Refills: 0 | Status: ACTIVE | COMMUNITY

## 2022-04-22 RX ORDER — ISONIAZID 300 MG/1
300 TABLET ORAL DAILY
Refills: 0 | Status: ACTIVE | COMMUNITY

## 2022-05-03 ENCOUNTER — NON-APPOINTMENT (OUTPATIENT)
Age: 62
End: 2022-05-03

## 2022-05-03 ENCOUNTER — APPOINTMENT (OUTPATIENT)
Dept: CARDIOLOGY | Facility: CLINIC | Age: 62
End: 2022-05-03
Payer: MEDICAID

## 2022-05-03 VITALS
HEART RATE: 71 BPM | WEIGHT: 115 LBS | OXYGEN SATURATION: 98 % | SYSTOLIC BLOOD PRESSURE: 147 MMHG | HEIGHT: 69 IN | BODY MASS INDEX: 17.03 KG/M2 | DIASTOLIC BLOOD PRESSURE: 77 MMHG

## 2022-05-03 DIAGNOSIS — Z86.73 PERSONAL HISTORY OF TRANSIENT ISCHEMIC ATTACK (TIA), AND CEREBRAL INFARCTION W/OUT RESIDUAL DEFICITS: ICD-10-CM

## 2022-05-03 DIAGNOSIS — E11.9 TYPE 2 DIABETES MELLITUS W/OUT COMPLICATIONS: ICD-10-CM

## 2022-05-03 DIAGNOSIS — R94.31 ABNORMAL ELECTROCARDIOGRAM [ECG] [EKG]: ICD-10-CM

## 2022-05-03 DIAGNOSIS — Z82.49 FAMILY HISTORY OF ISCHEMIC HEART DISEASE AND OTHER DISEASES OF THE CIRCULATORY SYSTEM: ICD-10-CM

## 2022-05-03 PROCEDURE — 93000 ELECTROCARDIOGRAM COMPLETE: CPT

## 2022-05-03 PROCEDURE — 99203 OFFICE O/P NEW LOW 30 MIN: CPT | Mod: 25

## 2022-05-03 NOTE — CARDIOLOGY SUMMARY
[de-identified] : Sinus rhythm 70's  [de-identified] :  EXAM:  ECHO TTE WO CON COMP W DOPP  \par \par  \par PROCEDURE DATE:  07/10/2020  \par \par \par INTERPRETATION:  REPORT:  \par TRANSTHORACIC ECHOCARDIOGRAM REPORT\par \par  \par \par Patient Name:   ALIX CORRALES Patient Location: [facility] Rec #:  [id number]    Accession #:      [id number]\par Account #:                    Height:           67.7 in 172.0 cm\par YOB: 1960     Weight:           100.1 lb 45.40 kg\par Patient Age:    60 years      BSA:              1.52 m?\par Patient Gender: M             BP:               113/63 mmHg\par \par  \par Date of Exam:        7/10/2020 8:30:09 AM\par Sonographer:         ZACH\par Referring Physician: AMY\par \par Procedure:   2D Echo/Doppler/Color Doppler Complete.\par Indications: Syncope and collapse - R55\par Diagnosis:   Syncope and collapse - R55\par \par  \par \par 2D AND M-MODE MEASUREMENTS (normal ranges within parentheses):\par Left Ventricle:                  Normal   Aorta/Left Atrium:          Normal\par IVSd (2D):              0.83 cm (0.7-1.1) Aortic Root (2D):  2.80 cm (2.4-3.7)\par LVPWd (2D):             0.81 cm (0.7-1.1) Left Atrium (2D):  2.78 cm (1.9-4.0)\par LVIDd (2D):             4.22 cm (3.4-5.7) Right Ventricle:\par LVIDs (2D):             2.47 cm           TAPSE:           2.20 cm\par LV FS (2D):             41.3 %   (>25%)\par Relative Wall Thickness  0.38    (<0.42)\par \par LV DIASTOLIC FUNCTION:\par MV Peak E: 0.86 m/s Decel Time: 268 msec\par MV Peak A: 0.56 m/s\par E/A Ratio: 1.55\par \par SPECTRAL DOPPLER ANALYSIS (where applicable):\par Mitral Valve:\par MV P1/2 Time: 77.62 msec\par MV Area, PHT: 2.83 cm?\par \par Aortic Valve: AoV Max Pako: 1.55 m/s AoV Peak P.7 mmHg AoV Mean P.4 mmHg\par \par LVOT Vmax: 1.34 m/s LVOT VTI: 0.298 m LVOT Diameter: 1.42 cm\par \par AoV Area, Vmax: 1.36 cm? AoV Area, VTI: 1.60 cm? AoV Area, Vmn: 1.42 cm?\par \par Aortic Insufficiency:\par AI Half-time:  1015 msec\par AI Decel Rate: 1.02 m/s?\par \par Tricuspid Valve and PA/RV Systolic Pressure: TR Max Velocity: 2.44 m/s RA Pressure: 5 mmHg RVSP/PASP: 28.9 mmHg\par \par  \par PHYSICIAN INTERPRETATION:\par Left Ventricle: Normal left ventricular size and wall thicknesses, with normal systolic and diastolic function.\par Global LV systolic function was normal. Left ventricular ejection fraction, by visual estimation, is 60 to 65%.\par Right Ventricle: Normal right ventricular size and function.\par Left Atrium: The left atrium is normal in size. Normal left atrial size.\par Right Atrium: The right atrium is normal in size. Normal right atrial size.\par Pericardium: There is no evidence of pericardial effusion.\par Mitral Valve: Structurally normal mitral valve, with normal leaflet excursion. The mitral valve is normal in structure. Trace mitral valve regurgitation is seen.\par Tricuspid Valve: Structurally normal tricuspid valve, with normal leaflet excursion. The tricuspid valve is normal in structure. Mild tricuspid regurgitation is visualized.\par Aortic Valve: Normal trileaflet aortic valve with normal opening. The aortic valve is normal. Mild aortic valve regurgitation is seen.\par Pulmonic Valve: Structurally normal pulmonic valve, with normal leaflet excursion. The pulmonic valve is normal. No indication of pulmonic valve regurgitation.\par Aorta: The aortic root and ascending aorta are structurally normal, with no evidence of dilitation.\par Pulmonary Artery: The main pulmonary artery is normal in size.\par  \par \par Summary:\par  1. Left ventricular ejection fraction, by visual estimation, is 60 to 65%.\par  2. Normal global left ventricular systolic function.\par  3. Normal left ventricular size and wall thicknesses, with normal systolic and diastolic function.\par  4. Normal right ventricular size and function.\par  5. The left atrium is normal in size.\par  6. Trace mitral valve regurgitation.\par  7. Mild aortic regurgitation.\par \par [id number] Michael Hernandez MDMD\par Electronically signed on 7/10/2020 at 9:38:26 PM\par  \par \par \par \par \par   \par \par \par \par \par \par \par MICHAEL HERNANDEZ M.D.; Attending Cardiologist\par This document has been electronically signed. Jul 10 2020  8:30AM\par   \par \par   \par \par   \par Test Set Comments:

## 2022-05-03 NOTE — DISCUSSION/SUMMARY
[FreeTextEntry1] : Mr. ALIX CORRALES 61 year old M carries a strong family history of HTN ( grandma maternal ) and personal history of diabetes ( diagnosed in 7/2021 was on Metformin and Januvia - now discontinued A1c 4.5,  TB - diagnosed July 2021 ( on INH and Rifampin), ? TIA ( 7/2021) was referred by his PMD for evaluation of abnormal EKG. \par \par # TB : Currently on INH and RIfampin \par \par # Abnormal EKG: Sinus rhythm with occ PAC \par \par # Elevated blood pressure: \par BP Stable \par Encouraged Patient to monitor BP at home and keep a log and report results back to us for evaluation.\par Additionally, encouraged heart healthy diet and exercise as tolerated.\par EKG with no acute changes.\par

## 2022-05-03 NOTE — HISTORY OF PRESENT ILLNESS
[FreeTextEntry1] : Mr. ALIX CORRALES 61 year old M is here May 03, 2022 to establish care in the cardiology practice. Patient carries a strong family history of HTN ( grandma maternal ) and personal history of diabetes ( diagnosed in 7/2021 was on Metformin and Januvia - now discontinued A1c 4.5,  TB - diagnosed July 2021 ( on INH and Rifampin), ? TIA ( 7/2021) was referred by his PMD for evaluation of abnormal EKG. \par Denies chest pain, shortness of breath, dizziness, lightheadedness, palpitations or near syncope or syncope, orthopnea, PND and increasing lower extremity edema. \par TTE - WNL ( 2020) \par \par # TB : Currently on INH and RIfampin \par \par # Abnormal EKG: Sinus rhythm with occ PAC \par \par # Elevated blood pressure: \par BP Stable \par Encouraged Patient to monitor BP at home and keep a log and report results back to us for evaluation.\par Additionally, encouraged heart healthy diet and exercise as tolerated.\par EKG with no acute changes.\par

## 2023-01-01 NOTE — DIETITIAN INITIAL EVALUATION ADULT. - PROBLEM SELECTOR PROBLEM 3
Bed: Trauma A  Expected date:   Expected time:   Means of arrival:   Comments:  EMS   Dyslipidemia Drysol Counseling:  I discussed with the patient the risks of drysol/aluminum chloride including but not limited to skin rash, itching, irritation, burning.

## 2024-01-11 NOTE — CONSULT NOTE ADULT - CONSULT REASON
Patient: Zari Francisco    Procedure: Procedure(s):  Colonoscopy with CO2 insufflation  COLONOSCOPY, WITH POLYPECTOMY AND BIOPSY       Anesthesia Type:  MAC    Note:  Disposition: Outpatient   Postop Pain Control: Uneventful            Sign Out: Well controlled pain   PONV: No   Neuro/Psych: Uneventful            Sign Out: Acceptable/Baseline neuro status   Airway/Respiratory: Uneventful            Sign Out: Acceptable/Baseline resp. status   CV/Hemodynamics: Uneventful            Sign Out: Acceptable CV status; No obvious hypovolemia; No obvious fluid overload   Other NRE:    DID A NON-ROUTINE EVENT OCCUR?            Last vitals:  Vitals Value Taken Time   /89 01/11/24 0851   Temp 97.3  F (36.3  C) 01/11/24 0836   Pulse     Resp 16 01/11/24 0851   SpO2 95 % 01/11/24 0851       Electronically Signed By: Slick Foster MD  January 11, 2024  9:09 AM   admission for new dm hyperglycemia

## 2025-04-09 NOTE — ED PROVIDER NOTE - CONSTITUTIONAL, MLM
program.      The patient was directed to continue all current medications at the current dosages.  There are no contraindications for the patient to take any medications that are necessary for treatment of other medical issues including medications for diabetes mellitus and hypercholesterolemia.    The patient was counseled regarding alcohol consumption and that this could contribute to fatty liver disease.    Vaccination for viral hepatitis B is recommended since the patient has no serologic evidence of previous exposure or vaccination with immunity.    The need for vaccination against viral hepatitis A will be assessed with serologic and instituted as appropriate.    All of the above issues were discussed with the patient.  All questions were answered.  The patient expressed a clear understanding of the above.     Allergies   Allergen Reactions    Ciprofloxacin Itching     \"scalp burns and body itches\"    Macrobid [Nitrofurantoin Monohyd/M-Cryst] Other (comments)     Scarred lungs    Serotonin 5ht-3 Antagonists Other (comments)     fever    Sulfa (Sulfonamide Antibiotics) Hives    Tetanus Immune Globulin Swelling     Current Outpatient Medications   Medication Sig    montelukast (SINGULAIR) 10 mg tablet Take 10 mg by mouth daily.    vit B complex no.12/niacin,B3, (VITAMIN B COMPLEX NO.12-NIACIN PO) Take 1,000 mg by mouth.    glucose blood VI test strips (PRECISION XTRA TEST) strip Use to check bs 5x daily    ERMIAS PEN NEEDLE 32 gauge x 5/32\" ndle     metoprolol succinate (TOPROL-XL) 100 mg tablet Take  by mouth daily.    atorvastatin (LIPITOR) 10 mg tablet Take  by mouth daily.    insulin lispro (HUMALOG) 100 unit/mL kwikpen by SubCUTAneous route Before breakfast, lunch, dinner and at bedtime. Sliding scale   Indications: TYPE 2 DIABETES MELLITUS    nitroglycerin (NITROSTAT) 0.4 mg SL tablet 0.4 mg by SubLINGual route every five (5) minutes as needed for Chest Pain.    allopurinol (ZYLOPRIM) 300 mg tablet Take  by 
- - -